# Patient Record
Sex: FEMALE | Race: WHITE | NOT HISPANIC OR LATINO | Employment: OTHER | URBAN - METROPOLITAN AREA
[De-identification: names, ages, dates, MRNs, and addresses within clinical notes are randomized per-mention and may not be internally consistent; named-entity substitution may affect disease eponyms.]

---

## 2017-01-12 ENCOUNTER — OFFICE VISIT (OUTPATIENT)
Dept: FAMILY MEDICINE | Facility: CLINIC | Age: 67
End: 2017-01-12
Payer: COMMERCIAL

## 2017-01-12 VITALS
HEART RATE: 71 BPM | TEMPERATURE: 97.5 F | SYSTOLIC BLOOD PRESSURE: 110 MMHG | DIASTOLIC BLOOD PRESSURE: 78 MMHG | BODY MASS INDEX: 20.06 KG/M2 | OXYGEN SATURATION: 99 % | WEIGHT: 109 LBS | HEIGHT: 62 IN | RESPIRATION RATE: 16 BRPM

## 2017-01-12 DIAGNOSIS — R29.6 FALLS FREQUENTLY: ICD-10-CM

## 2017-01-12 DIAGNOSIS — R26.89 BALANCE PROBLEMS: Primary | ICD-10-CM

## 2017-01-12 DIAGNOSIS — M19.042 PRIMARY OSTEOARTHRITIS OF BOTH HANDS: ICD-10-CM

## 2017-01-12 DIAGNOSIS — I49.3 PVC'S (PREMATURE VENTRICULAR CONTRACTIONS): ICD-10-CM

## 2017-01-12 DIAGNOSIS — S06.0X0A CONCUSSION WITHOUT LOSS OF CONSCIOUSNESS, INITIAL ENCOUNTER: ICD-10-CM

## 2017-01-12 DIAGNOSIS — R41.3 MEMORY LOSS: ICD-10-CM

## 2017-01-12 DIAGNOSIS — M19.041 PRIMARY OSTEOARTHRITIS OF BOTH HANDS: ICD-10-CM

## 2017-01-12 PROCEDURE — 99214 OFFICE O/P EST MOD 30 MIN: CPT | Performed by: FAMILY MEDICINE

## 2017-01-12 NOTE — MR AVS SNAPSHOT
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 No difference was  Noted by patients in a double blind study when given codeine, tylenol ( acetaminophen) or ibuprofen (all in identical pills). They felt no difference in pain relief. Since ibuprofen causes kidney damage, esophageal damage with heartburn, and can increase the risk of esophageal and stomach cancer and ulcers,and colonic strictures.  I recommend that you use tylenol(acetaminophen) for pain. Use the acetaminophen ES  Which has 500mgm/tablet You can take up to 2 tablets 4 times a day as need for pain.  If this is not enough, you can add in ibuprofen or aleve(naprosyn) with 2 glasses of fluid and some food-to protect the stomach and esophagus. Please let us know if you are continuing to take ibuprofen or aleve, as we will need to periodically check your kidney function with a blood test.    2. Warm soaks     3. Heating pad and keep your hands under the covers--electric blanket ?  Could use mittons     4. If no help with the balance PT clinic, then we should consider regular PT for balance and strength training and for the hands --incl OT     5. offerred EKG but deferred   Normal in 2013   Caffeine will stimulate the heart and cause PVCs = early beats        Follow-ups after your visit        Additional Services     PHYSICAL THERAPY REFERRAL       *This therapy referral will be filtered to a centralized scheduling office at [facility] and the patient will receive a call to schedule an appointment at a [place] location most convenient for them. *     [facility] provides Physical Therapy evaluation and treatment and many specialty services across the [place] system.  If requesting a specialty program, please choose from the list below.    If you have not heard from the scheduling office within 2 business days, please call [phone] for all locations, with the exception of [place], please call [phone].  Treatment: Evaluation & Treatment  Special Instructions/Modalities:     Special Programs: Balance/Vestibular    Please be aware that coverage of these services is subject to the terms and limitations of your health insurance plan.  Call member services at your health plan with any benefit or coverage questions.      **Note to Provider:  If you are referring outside of [place] for the therapy appointment, please list the name of the location in the  special instructions  above, print the referral and give to the patient to schedule the appointment.                  Follow-up notes from your care team     Return in about 3 months (around 4/12/2017) for Routine Visit.      Who to contact     If you have questions or need follow up information about today's clinic visit or your schedule please contact [facility] directly at [phone].  Normal or non-critical lab and imaging results will be communicated to you by MyChart, letter or phone within 4 business days after the clinic has received the results. If you do not hear from us within 7 days, please contact the clinic through MyChart or phone. If you have a critical or abnormal lab result, we will notify you by phone as soon as possible.  Submit refill requests through [employer] or call your pharmacy and they will forward the refill request to us. Please allow 3 business days for your refill to be completed.           "Additional Information About Your Visit        Care EveryWhere ID     This is your Care EveryWhere ID. This could be used by other organizations to access your [place] medical records  [id number]        Your Vitals Were     Pulse Temperature Respirations    71 97.5  F (36.4  C) (Tympanic) 16    Height BMI (Body Mass Index) Pulse Oximetry    5' 2\" (1.575 m) 19.93 kg/m2 99%    Last Period Breastfeeding?       (LMP Unknown) No        Blood Pressure from Last 3 Encounters:   01/12/17 110/78   12/29/16 112/60   11/03/16 110/60    Weight from Last 3 Encounters:   01/12/17 109 lb (49.442 kg)   12/29/16 109 lb (49.442 kg)   11/03/16 106 lb (48.081 kg)              We Performed the Following     PHYSICAL THERAPY REFERRAL        Primary Care Provider Office Phone # Fax #    Vale Wiseman [phone]       [facility] [address] [facility] [address]        Thank you!     Thank you for choosing [facility]  for your care. Our goal is always to provide you with excellent care. Hearing back from our patients is one way we can continue to improve our services. Please take a few minutes to complete the written survey that you may receive in the mail after your visit with us. Thank you!             Your Updated Medication List - Protect others around you: Learn how to safely use, store and throw away your medicines at www.disposemymeds.org.          This list is accurate as of: 1/12/17  1:27 PM.  Always use your most recent med list.                   Brand Name Dispense Instructions for use    aspirin 81 MG tablet     90 tablet    Take 1 tablet (81 mg) by mouth daily       atorvastatin 40 MG tablet    LIPITOR    90 tablet    Take 1 tablet (40 mg) by mouth daily       EPIPEN 2-LISA 0.3 MG/0.3ML injection   Generic drug:  EPINEPHrine     2 each    INJECT 0.3 MLS INTO THE MUSCLE ONCE AS NEEDED FOR ANAPHYLAXIS       levothyroxine 75 MCG tablet    " SYNTHROID/LEVOTHROID    90 tablet    Take 1 tablet (75 mcg) by mouth daily       OMEGA-3 FISH OIL PO      Take 1 g by mouth 2 times daily (with meals).       typhoid CR capsule    VIVOTIF    4 capsule    Take 1 capsule by mouth every other day With the last dose at least 7 days prior to exposure       VITAMIN D (CHOLECALCIFEROL) PO      Take 1,000 Units by mouth daily.

## 2017-01-12 NOTE — PROGRESS NOTES
SUBJECTIVE:                                                    Alma Moore is a 66 year old female who presents to clinic today for the following health issues:    Musculoskeletal problem/pain: Hand Arthritis         Duration: Chronic and Ongoing-worsening     Especially stiff in am but does not keep hands warm at night     fam hx - praticularly her mom     Description  Location: Both Hands-ache kell in the am when stiff & cold   Joints are enlarging     Intensity:  severe    Accompanying signs and symptoms: none    History  Previous similar problem: no    \fam hx of OA or degen arthritis of hands in mom    Neg fam hx for RZ, LE    Previous evaluation:   Neg ESR & WILLIAM     Precipitating or alleviating factors:  Trauma or overuse: no    Aggravating factors include: none    Therapies tried and outcome: nothing            Amount of exercise or physical activity: None    Problems taking medications regularly: No    Medication side effects: none  Diet: regular (no restrictions)           BAlance problems       Duration: since chemo for breast ca in 2013    Description   Feeling faint:  no   Feeling like the surroundings are moving: no   Loss of consciousness or falls: yes   With repeated head injuries/concussion but no LOC     Intensity:  moderate    Accompanying signs and symptoms:   Nausea/vomitting: no   Palpitations: YES- feels extra beats at night  Weakness in arms or legs: no   Vision or speech changes: no   Ringing in ears (Tinnitus): no   Hearing loss related to dizziness: no   Other (fevers/chills/sweating/dyspnea): no     History (similar episodes/head trauma/previous evaluation/recent bleeding): None    Precipitating or alleviating factors (new meds/chemicals): chemo  Worse with activity/head movement: no     Therapies tried and outcome: None     Rash      Duration: for several mos under OS     Description  Location: above   Itching: mild    Intensity:  mild    Accompanying signs and symptoms:  None    History (similar episodes/previous evaluation): None    Precipitating or alleviating factors:  New exposures:  None  Recent travel: no      Therapies tried and outcome: hydrocortisone cream -  Effective-essentially gone now     Also using more care when removing make up as was rubing there more      Extra Heart Beats       Duration:  Since 2013     Especially at night     Description (location/character/radiation): 0    Intensity:  mild    Accompanying signs and symptoms: 0    History (similar episodes/previous evaluation): None    Precipitating or alleviating factors: still has 2-4 C decaff coffee / d     Therapies tried and outcome: None     MEMORY LOSS       Duration:  Since chemo 2013     Description (location/character/radiation): above    Intensity:  mild    Accompanying signs and symptoms: 0    History (similar episodes/previous evaluation): None    Precipitating or alleviating factors: above    Therapies tried and outcome: None  Problem list and histories reviewed & adjusted, as indicated.  Additional history: as documented    Labs reviewed in EPIC  Problem list, Medication list, Allergies, and Medical/Social/Surgical histories reviewed in [facility] and updated as appropriate.    ROS:  C: NEGATIVE for fever, chills, change in weight  I: NEGATIVE for worrisome rashes, moles or lesions-resolved rash under OS   E: NEGATIVE for vision changes or irritation  E/M: NEGATIVE for ear, mouth and throat problems  R: NEGATIVE for significant cough or SOB  B: NEGATIVE for masses, tenderness or discharge  CV: NEGATIVE for chest pain,  or peripheral edema;palpitations= extra beats - kell at nite   GI: NEGATIVE for nausea, abdominal pain, heartburn, or change in bowel habits  : NEGATIVE for frequency, dysuria, or hematuria  M: NEGATIVE for significant arthralgias or myalgia  N: NEGATIVE for weakness, dizziness or paresthesias; off balance with hx of falls   E: NEGATIVE for temperature intolerance, skin/hair changes  H:  "NEGATIVE for bleeding problems  P: NEGATIVE for changes in mood or affect    OBJECTIVE:                                                    /78 mmHg  Pulse 71  Temp(Src) 97.5  F (36.4  C) (Tympanic)  Resp 16  Ht 5' 2\" (1.575 m)  Wt 109 lb (49.442 kg)  BMI 19.93 kg/m2  SpO2 99%  LMP  (LMP Unknown)  Breastfeeding? No  Body mass index is 19.93 kg/(m^2).  GENERAL: healthy, alert and no distress  EYES: Eyes grossly normal to inspection, PERRL and conjunctivae and sclerae normal  RESP: lungs clear to auscultation - no rales, rhonchi or wheezes  CV: regular rate and rhythm, normal S1 S2, no S3 or S4, no murmur, click or rub, no peripheral edema and peripheral pulses strong  MS: no gross musculoskeletal defects noted, no edema--has enlarge joints of the hands with synovial thickening   SKIN: no suspicious lesions or rashes  NEURO: Normal strength and tone, mentation intact and speech normal  PSYCH: mentation appears normal, affect normal/bright    Diagnostic Test Results:  none      ASSESSMENT/PLAN:                                                              ICD-10-CM    1. Balance problems from chemo 2013  R26.89 PHYSICAL THERAPY REFERRAL   2. Falls frequently causing recurrent head injuries  R29.6    3. Concussion without loss of consciousness, initial encounter 9-17-16 S06.0X0A    4. 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 Primary osteoarthritis of both hands-hereditary  M19.041     M19.042    6. PVC's (premature ventricular contractions) I49.3        Patient Instructions   1. No difference was  Noted by patients in a double blind study when given codeine, tylenol ( acetaminophen) or ibuprofen (all in identical pills). They felt no difference in pain relief. Since ibuprofen causes kidney damage, esophageal damage with heartburn, and can increase the risk of esophageal and stomach cancer and ulcers,and colonic strictures.  I recommend that you use tylenol(acetaminophen) for pain. Use the acetaminophen " ES  Which has 500mgm/tablet You can take up to 2 tablets 4 times a day as need for pain.  If this is not enough, you can add in ibuprofen or aleve(naprosyn) with 2 glasses of fluid and some food-to protect the stomach and esophagus. Please let us know if you are continuing to take ibuprofen or aleve, as we will need to periodically check your kidney function with a blood test.    2. Warm soaks     3. Heating pad and keep your hands under the covers--electric blanket ?  Could use mittons     4. If no help with the balance PT clinic, then we should consider regular PT for balance and strength training and for the hands --incl OT     5. offerred EKG but deferred   Normal in 2013   Caffeine will stimulate the heart and cause PVCs = early beats    As you age, all the above will worsen     Discussed all with pt  And the patient expresses understanding   pt must keep hands warm at nite and use tylenol for anti inflam effect     Recommend stop  Coffee re the PVCs but she refuses and refuses further cardiac work up  , even a n EKG .    Vale Wiseman MD  [facility]

## 2017-01-12 NOTE — PATIENT INSTRUCTIONS
1. No difference was  Noted by patients in a double blind study when given codeine, tylenol ( acetaminophen) or ibuprofen (all in identical pills). They felt no difference in pain relief. Since ibuprofen causes kidney damage, esophageal damage with heartburn, and can increase the risk of esophageal and stomach cancer and ulcers,and colonic strictures.  I recommend that you use tylenol(acetaminophen) for pain. Use the acetaminophen ES  Which has 500mgm/tablet You can take up to 2 tablets 4 times a day as need for pain.  If this is not enough, you can add in ibuprofen or aleve(naprosyn) with 2 glasses of fluid and some food-to protect the stomach and esophagus. Please let us know if you are continuing to take ibuprofen or aleve, as we will need to periodically check your kidney function with a blood test.    2. Warm soaks     3. Heating pad and keep your hands under the covers--electric blanket ?  Could use mittons     4. If no help with the balance PT clinic, then we should consider regular PT for balance and strength training and for the hands --incl OT     5. offerred EKG but deferred   Normal in 2013   Caffeine will stimulate the heart and cause PVCs = early beats    As you age, all the above will worsen

## 2017-01-12 NOTE — NURSING NOTE
"Chief Complaint   Patient presents with     Recheck Medication     /78 mmHg  Pulse 71  Temp(Src) 97.5  F (36.4  C) (Tympanic)  Resp 16  Ht 5' 2\" (1.575 m)  Wt 109 lb (49.442 kg)  BMI 19.93 kg/m2  SpO2 99%  LMP  (LMP Unknown)  Breastfeeding? No Estimated body mass index is 19.93 kg/(m^2) as calculated from the following:    Height as of this encounter: 5' 2\" (1.575 m).    Weight as of this encounter: 109 lb (49.442 kg).  BP completed using cuff size: peter Cannon CMA    There are no preventive care reminders to display for this patient.  Health Maintenance reviewed at today's visit patient asked to schedule/complete:   None, Health Maintenance up to date.      "

## 2017-01-18 ENCOUNTER — TRANSFERRED RECORDS (OUTPATIENT)
Dept: HEALTH INFORMATION MANAGEMENT | Facility: CLINIC | Age: 67
End: 2017-01-18

## 2017-01-24 ENCOUNTER — TELEPHONE (OUTPATIENT)
Dept: FAMILY MEDICINE | Facility: CLINIC | Age: 67
End: 2017-01-24

## 2017-01-24 NOTE — TELEPHONE ENCOUNTER
Diamox is not given out lightly as has many complication s of its own  Needs to come in to discuss and review

## 2017-01-24 NOTE — TELEPHONE ENCOUNTER
Reason for Call:  Medication or medication refill:    Do you use a [facility]?  Name of the pharmacy and phone number for the current request:  [facility] [address] AT [facility] [phone] (Phone)    Name of the medication requested: Altitude Sickness    Other request: Patient stated she is planning a trip to [facility] and that her friend recommended getting a Prescription for Altitude Sickness.    Can we leave a detailed message on this number? YES    Phone number patient can be reached at: Home number on file [phone] (home)    Best Time: When Prescription is approved.    Call taken on 1/24/2017 at 10:01 AM by SHANNAN MALONE

## 2017-01-24 NOTE — TELEPHONE ENCOUNTER
Attempted to call back, patient does not have an answering machine. Will try and call again tomorrow.

## 2017-01-26 NOTE — TELEPHONE ENCOUNTER
Patient was called and told appointment would be needed before script for medication would be issued.

## 2017-01-31 ENCOUNTER — OFFICE VISIT (OUTPATIENT)
Dept: FAMILY MEDICINE | Facility: CLINIC | Age: 67
End: 2017-01-31
Payer: COMMERCIAL

## 2017-01-31 VITALS
HEIGHT: 62 IN | OXYGEN SATURATION: 99 % | WEIGHT: 109.4 LBS | RESPIRATION RATE: 16 BRPM | BODY MASS INDEX: 20.13 KG/M2 | DIASTOLIC BLOOD PRESSURE: 60 MMHG | SYSTOLIC BLOOD PRESSURE: 110 MMHG | TEMPERATURE: 98.1 F | HEART RATE: 70 BPM

## 2017-01-31 DIAGNOSIS — Z29.89 ALTITUDE SICKNESS PROPHYLAXIS: Primary | ICD-10-CM

## 2017-01-31 DIAGNOSIS — Z88.2 ALLERGY TO SULFA DRUGS: ICD-10-CM

## 2017-01-31 PROCEDURE — 99212 OFFICE O/P EST SF 10 MIN: CPT | Performed by: FAMILY MEDICINE

## 2017-01-31 NOTE — NURSING NOTE
"Chief Complaint   Patient presents with     Consult     Discuss altitude sickness medication       Initial /60 mmHg  Pulse 70  Temp(Src) 98.1  F (36.7  C) (Tympanic)  Resp 16  Ht 5' 2\" (1.575 m)  Wt 109 lb 6.4 oz (49.624 kg)  BMI 20.00 kg/m2  SpO2 99%  LMP  (LMP Unknown)  Breastfeeding? No Estimated body mass index is 20 kg/(m^2) as calculated from the following:    Height as of this encounter: 5' 2\" (1.575 m).    Weight as of this encounter: 109 lb 6.4 oz (49.624 kg).  BP completed using cuff size: peter Odom LPN  "

## 2017-01-31 NOTE — PROGRESS NOTES
"  SUBJECTIVE:                                                    Alma Moore is a 66 year old female who presents to clinic today for the following health issues:        Consult re Altitude Sickness       Duration: Planning a trip and wants to discuss altitude sickness    Description (location/character/radiation): flies into [place] at > [place] and then to  [facility] at [place] so would be gradual     Is allergic to sulfa and this crosses over with diamox     Intensity:  NA    Accompanying signs and symptoms: None    History (similar episodes/previous evaluation): None    Precipitating or alleviating factors: None    Therapies tried and outcome: None           Problem list and histories reviewed & adjusted, as indicated.  Additional history: as documented    Labs reviewed in EPIC  Problem list, Medication list, Allergies, and Medical/Social/Surgical histories reviewed in [facility] and updated as appropriate.    ROS:  C: NEGATIVE for fever, chills, change in weight  I: NEGATIVE for worrisome rashes, moles or lesions  E: NEGATIVE for vision changes or irritation  E/M: NEGATIVE for ear, mouth and throat problems  R: NEGATIVE for significant cough or SOB  B: NEGATIVE for masses, tenderness or discharge  CV: NEGATIVE for chest pain, palpitations or peripheral edema  GI: NEGATIVE for nausea, abdominal pain, heartburn, or change in bowel habits  : NEGATIVE for frequency, dysuria, or hematuria  M: NEGATIVE for significant arthralgias or myalgia  N: NEGATIVE for weakness, dizziness or paresthesias  E: NEGATIVE for temperature intolerance, skin/hair changes  H: NEGATIVE for bleeding problems  P: NEGATIVE for changes in mood or affect    OBJECTIVE:                                                    /60 mmHg  Pulse 70  Temp(Src) 98.1  F (36.7  C) (Tympanic)  Resp 16  Ht 5' 2\" (1.575 m)  Wt 109 lb 6.4 oz (49.624 kg)  BMI 20.00 kg/m2  SpO2 99%  LMP  (LMP Unknown)  Breastfeeding? No  Body mass index is 20 " kg/(m^2).  GENERAL: healthy, alert and no distress  EYES: Eyes grossly normal to inspection, PERRL and conjunctivae and sclerae normal  RESP: lungs clear to auscultation - no rales, rhonchi or wheezes  MS: no gross musculoskeletal defects noted, no edema  SKIN: no suspicious lesions or rashes  NEURO: Normal strength and tone, mentation intact and speech normal  PSYCH: mentation appears normal, affect normal/bright    Diagnostic Test Results:  none      ASSESSMENT/PLAN:                                                              ICD-10-CM    1. Altitude sickness prophylaxis Z41.8    2. Allergy to sulfa drugs Z88.2        PLAN     As she doesn't qualify for diamox with the gradual elevation to 8000 ft over 8-10 hrs, and has never had trouble at this elevation before , and as is allergic to sulfa which croses over with diamox, should monitor for signs of altitude sickness and go to the clinic at [facility] if develops them for Rx with steroids   Discussed all with pt  And the patient expresses understanding  Time spent with the patient 12mins, more than 50% in counseling and coordinating care, Re above medical problems.  This time was  spent in review of data, the record, and in discussion and counselling  With > 50% face-to-face time as per above       MD Vale Yost MD  [facility]

## 2017-01-31 NOTE — MR AVS SNAPSHOT
"              After Visit Summary   1/31/2017    Alma Moore    MRN: [id number]           Patient Information     Date Of Birth          1950        Visit Information        Provider Department      1/31/2017 11:40 AM Vale Wiseman MD [facility]         Follow-ups after your visit        Who to contact     If you have questions or need follow up information about today's clinic visit or your schedule please contact [facility] directly at [phone].  Normal or non-critical lab and imaging results will be communicated to you by MyChart, letter or phone within 4 business days after the clinic has received the results. If you do not hear from us within 7 days, please contact the clinic through MyChart or phone. If you have a critical or abnormal lab result, we will notify you by phone as soon as possible.  Submit refill requests through [employer] or call your pharmacy and they will forward the refill request to us. Please allow 3 business days for your refill to be completed.          Additional Information About Your Visit        Care EveryWhere ID     This is your Care EveryWhere ID. This could be used by other organizations to access your [place] medical records  [id number]        Your Vitals Were     Pulse Temperature Respirations    70 98.1  F (36.7  C) (Tympanic) 16    Height BMI (Body Mass Index) Pulse Oximetry    5' 2\" (1.575 m) 20.00 kg/m2 99%    Last Period Breastfeeding?       (LMP Unknown) No        Blood Pressure from Last 3 Encounters:   01/31/17 110/60   01/12/17 110/78   12/29/16 112/60    Weight from Last 3 Encounters:   01/31/17 109 lb 6.4 oz (49.624 kg)   01/12/17 109 lb (49.442 kg)   12/29/16 109 lb (49.442 kg)              Today, you had the following     No orders found for display       Primary Care Provider Office Phone # Fax #    Vale Wiseman [phone]       [facility] " [address]  [facility] [address]        Thank you!     [facility]  [facility] your care. Our goal is always to provide you with excellent care. Hearing back from our patients is one way we can continue to improve our services. Please take a few minutes to complete the written survey that you may receive in the mail after your visit with us. Thank you!             Your Updated Medication List - Protect others around you: Learn how to safely use, store and throw away your medicines at www.disposemymeds.org.          This list is accurate as of: 1/31/17 12:24 PM.  Always use your most recent med list.                   Brand Name Dispense Instructions for use    aspirin 81 MG tablet     90 tablet    Take 1 tablet (81 mg) by mouth daily       atorvastatin 40 MG tablet    LIPITOR    90 tablet    Take 1 tablet (40 mg) by mouth daily       EPIPEN 2-LISA 0.3 MG/0.3ML injection   Generic drug:  EPINEPHrine     2 each    INJECT 0.3 MLS INTO THE MUSCLE ONCE AS NEEDED FOR ANAPHYLAXIS       levothyroxine 75 MCG tablet    SYNTHROID/LEVOTHROID    90 tablet    Take 1 tablet (75 mcg) by mouth daily       OMEGA-3 FISH OIL PO      Take 1 g by mouth 2 times daily (with meals).       typhoid CR capsule    VIVOTIF    4 capsule    Take 1 capsule by mouth every other day With the last dose at least 7 days prior to exposure       VITAMIN D (CHOLECALCIFEROL) PO      Take 1,000 Units by mouth daily.

## 2017-02-03 ENCOUNTER — OFFICE VISIT (OUTPATIENT)
Dept: FAMILY MEDICINE | Facility: CLINIC | Age: 67
End: 2017-02-03
Payer: COMMERCIAL

## 2017-02-03 VITALS
DIASTOLIC BLOOD PRESSURE: 78 MMHG | WEIGHT: 107 LBS | OXYGEN SATURATION: 99 % | BODY MASS INDEX: 19.69 KG/M2 | SYSTOLIC BLOOD PRESSURE: 118 MMHG | HEIGHT: 62 IN | RESPIRATION RATE: 18 BRPM | TEMPERATURE: 97.1 F | HEART RATE: 72 BPM

## 2017-02-03 DIAGNOSIS — N30.01 ACUTE CYSTITIS WITH HEMATURIA: ICD-10-CM

## 2017-02-03 LAB
ALBUMIN UR-MCNC: 100 MG/DL
APPEARANCE UR: ABNORMAL
BACTERIA #/AREA URNS HPF: ABNORMAL /HPF
BILIRUB UR QL STRIP: NEGATIVE
COLOR UR AUTO: YELLOW
GLUCOSE UR STRIP-MCNC: NEGATIVE MG/DL
HGB UR QL STRIP: ABNORMAL
KETONES UR STRIP-MCNC: NEGATIVE MG/DL
LEUKOCYTE ESTERASE UR QL STRIP: ABNORMAL
NITRATE UR QL: NEGATIVE
PH UR STRIP: 7 PH (ref 5–7)
RBC #/AREA URNS AUTO: ABNORMAL /HPF (ref 0–2)
SP GR UR STRIP: 1.01 (ref 1–1.03)
URN SPEC COLLECT METH UR: ABNORMAL
UROBILINOGEN UR STRIP-ACNC: 0.2 EU/DL (ref 0.2–1)
WBC #/AREA URNS AUTO: ABNORMAL /HPF (ref 0–2)

## 2017-02-03 PROCEDURE — 87186 SC STD MICRODIL/AGAR DIL: CPT | Performed by: FAMILY MEDICINE

## 2017-02-03 PROCEDURE — 87088 URINE BACTERIA CULTURE: CPT | Performed by: FAMILY MEDICINE

## 2017-02-03 PROCEDURE — 99213 OFFICE O/P EST LOW 20 MIN: CPT | Performed by: FAMILY MEDICINE

## 2017-02-03 PROCEDURE — 87086 URINE CULTURE/COLONY COUNT: CPT | Performed by: FAMILY MEDICINE

## 2017-02-03 PROCEDURE — 81001 URINALYSIS AUTO W/SCOPE: CPT | Performed by: FAMILY MEDICINE

## 2017-02-03 RX ORDER — AMOXICILLIN 500 MG/1
500 CAPSULE ORAL 3 TIMES DAILY
Qty: 21 CAPSULE | Refills: 0 | Status: SHIPPED | OUTPATIENT
Start: 2017-02-03 | End: 2017-06-22

## 2017-02-03 NOTE — PATIENT INSTRUCTIONS
1. You have  a bladder infection Please increase your water intake  Do not drink cranberry juice as this does nothing different from water except cause weight gain.   We  Will notify you re the results of today's urine culture  You may need to return to be sure the infection is gone.  Oftentimes sexual intercourse causes the bacteria from the rectum to go into the bladder and cause infection.  To avoid this, please get up and go to the bathroom after intercourse and drink a large glass of water.  This allows the urine to wash out the infective bacteria and thus speed healing of an infection, and prevent one from occurring.  You may buy pyridium over the counter to help with the burning  It is a purple pill that turns the urine orange and helps with the burning

## 2017-02-03 NOTE — PROGRESS NOTES
"  SUBJECTIVE:                                                    Alma Moore is a 66 year old female who presents to clinic today for the following health issues:      URINARY TRACT SYMPTOMS      Duration: x 3 days    Description  dysuria, frequency, hematuria and incontinence    Intensity:  mild    Accompanying signs and symptoms:  Fever/chills: YES  Flank pain YES  Nausea and vomiting: YES  Vaginal symptoms: none  Abdominal/Pelvic Pain: YES    History  History of frequent UTI's: YES  History of kidney stones: no   Sexually Active: no   Possibility of pregnancy: No    Precipitating or alleviating factors: USED A VAG dilator 3-4 d ago per her oncologist's orders     Had loose stool the same day     Therapies tried and outcome: none   Outcome: NA           Problem list and histories reviewed & adjusted, as indicated.  Additional history: as documented    Labs reviewed in EPIC  Problem list, Medication list, Allergies, and Medical/Social/Surgical histories reviewed in [facility] and updated as appropriate.    ROS:  C: NEGATIVE for fever, chills, change in weight  I: NEGATIVE for worrisome rashes, moles or lesions  E: NEGATIVE for vision changes or irritation  E/M: NEGATIVE for ear, mouth and throat problems  R: NEGATIVE for significant cough or SOB  B: NEGATIVE for masses, tenderness or discharge  CV: NEGATIVE for chest pain, palpitations or peripheral edema  GI: NEGATIVE for nausea, abdominal pain, heartburn, or change in bowel habits   female: dysuria, frequency, hematuria and urgency  M: NEGATIVE for significant arthralgias or myalgia  N: NEGATIVE for weakness, dizziness or paresthesias  E: NEGATIVE for temperature intolerance, skin/hair changes  H: NEGATIVE for bleeding problems  P: NEGATIVE for changes in mood or affect    OBJECTIVE:                                                    /78 mmHg  Pulse 72  Temp(Src) 97.1  F (36.2  C) (Tympanic)  Resp 18  Ht 5' 2\" (1.575 m)  Wt 107 lb (48.535 kg)  BMI " 19.57 kg/m2  SpO2 99%  LMP  (LMP Unknown)  Breastfeeding? No  Body mass index is 19.57 kg/(m^2).  GENERAL: healthy, alert and no distress  EYES: Eyes grossly normal to inspection, PERRL and conjunctivae and sclerae normal  RESP: lungs clear to auscultation - no rales, rhonchi or wheezes  ABDOMEN: soft, nontender, no hepatosplenomegaly, no masses and bowel sounds normal;No tenderness suprapubically or in the CVAs   MS: no gross musculoskeletal defects noted, no edema  SKIN: no suspicious lesions or rashes  NEURO: Normal strength and tone, mentation intact and speech normal  PSYCH: mentation appears normal, affect normal/bright    Diagnostic Test Results:  Results for orders placed or performed in visit on 02/03/17 (from the past 24 hour(s))   UA reflex to Microscopic and Culture   Result Value Ref Range    Color Urine Yellow     Appearance Urine Cloudy     Glucose Urine Negative NEG mg/dL    Bilirubin Urine Negative NEG    Ketones Urine Negative NEG mg/dL    Specific Gravity Urine 1.015 1.003 - 1.035    Blood Urine Large (A) NEG    pH Urine 7.0 5.0 - 7.0 pH    Protein Albumin Urine 100 (A) NEG mg/dL    Urobilinogen Urine 0.2 0.2 - 1.0 EU/dL    Nitrite Urine Negative NEG    Leukocyte Esterase Urine Large (A) NEG    Source Midstream Urine    Urine Microscopic   Result Value Ref Range    WBC Urine 10-25 (A) 0 - 2 /HPF    RBC Urine 10-25 (A) 0 - 2 /HPF    Bacteria Urine Moderate (A) NEG /HPF        ASSESSMENT/PLAN:                                                              ICD-10-CM    1. Acute cystitis with hematuria N30.01 amoxicillin (AMOXIL) 500 MG capsule       Patient Instructions   1. You have  a bladder infection Please increase your water intake  Do not drink cranberry juice as this does nothing different from water except cause weight gain.   We  Will notify you re the results of today's urine culture  You may need to return to be sure the infection is gone.  Oftentimes sexual intercourse causes the  bacteria from the rectum to go into the bladder and cause infection.  To avoid this, please get up and go to the bathroom after intercourse and drink a large glass of water.  This allows the urine to wash out the infective bacteria and thus speed healing of an infection, and prevent one from occurring.  You may buy pyridium over the counter to help with the burning  It is a purple pill that turns the urine orange and helps with the burning         The etiology of this UTI  Appears to be  That she 1st had loose stools that contaminated the urethra  And then  May have been pushed farther up the urethra by manipulation of [facility] vaginal dilator     Vale Wiseman MD  [facility]

## 2017-02-03 NOTE — MR AVS SNAPSHOT
After Visit Summary   2/3/2017    Alma Moore    MRN: [id number]           Patient Information     Date Of Birth          1950        Visit Information        Provider Department      2/3/2017 8:40 AM Vale Wiseman MD [facility]        Today's Diagnoses     Acute cystitis with hematuria           Care Instructions    1. You have  a bladder infection Please increase your water intake  Do not drink cranberry juice as this does nothing different from water except cause weight gain.   We  Will notify you re the results of today's urine culture  You may need to return to be sure the infection is gone.  Oftentimes sexual intercourse causes the bacteria from the rectum to go into the bladder and cause infection.  To avoid this, please get up and go to the bathroom after intercourse and drink a large glass of water.  This allows the urine to wash out the infective bacteria and thus speed healing of an infection, and prevent one from occurring.  You may buy pyridium over the counter to help with the burning  It is a purple pill that turns the urine orange and helps with the burning           Follow-ups after your visit        Who to contact     If you have questions or need follow up information about today's clinic visit or your schedule please contact [facility] directly at [phone].  Normal or non-critical lab and imaging results will be communicated to you by MyChart, letter or phone within 4 business days after the clinic has received the results. If you do not hear from us within 7 days, please contact the clinic through MyChart or phone. If you have a critical or abnormal lab result, we will notify you by phone as soon as possible.  Submit refill requests through [employer] or call your pharmacy and they will forward the refill request to us. Please allow 3 business days for your refill to be completed.           "Additional Information About Your Visit        Care EveryWhere ID     This is your Care EveryWhere ID. This could be used by other organizations to access your [place] medical records  [id number]        Your Vitals Were     Pulse Temperature Respirations    72 97.1  F (36.2  C) (Tympanic) 18    Height BMI (Body Mass Index) Pulse Oximetry    5' 2\" (1.575 m) 19.57 kg/m2 99%    Last Period Breastfeeding?       (LMP Unknown) No        Blood Pressure from Last 3 Encounters:   02/03/17 118/78   01/31/17 110/60   01/12/17 110/78    Weight from Last 3 Encounters:   02/03/17 107 lb (48.535 kg)   01/31/17 109 lb 6.4 oz (49.624 kg)   01/12/17 109 lb (49.442 kg)              We Performed the Following     UA reflex to Microscopic and Culture     Urine Culture Aerobic Bacterial     Urine Microscopic          Today's Medication Changes          These changes are accurate as of: 2/3/17  9:15 AM.  If you have any questions, ask your nurse or doctor.               Start taking these medicines.        Dose/Directions    amoxicillin 500 MG capsule   Commonly known as:  AMOXIL   Used for:  Acute cystitis with hematuria   Started by:  Vale Wiseman MD        Dose:  500 mg   Take 1 capsule (500 mg) by mouth 3 times daily   Quantity:  21 capsule   Refills:  0            Where to get your medicines      These medications were sent to [facility] [address] [place], [place] [address] [address] [address]     Phone:  [phone]    - amoxicillin 500 MG capsule             Primary Care Provider Office Phone # Fax #    Vale Wiseman [phone]       [facility] [address] [facility] [address]        Thank you!     Thank you for choosing [facility]  for your care. Our goal is always to provide you with excellent care. Hearing back from our patients is one way we can continue to improve our " services. Please take a few minutes to complete the written survey that you may receive in the mail after your visit with us. Thank you!             Your Updated Medication List - Protect others around you: Learn how to safely use, store and throw away your medicines at www.disposemymeds.org.          This list is accurate as of: 2/3/17  9:15 AM.  Always use your most recent med list.                   Brand Name Dispense Instructions for use    amoxicillin 500 MG capsule    AMOXIL    21 capsule    Take 1 capsule (500 mg) by mouth 3 times daily       aspirin 81 MG tablet     90 tablet    Take 1 tablet (81 mg) by mouth daily       atorvastatin 40 MG tablet    LIPITOR    90 tablet    Take 1 tablet (40 mg) by mouth daily       EPIPEN 2-LSIA 0.3 MG/0.3ML injection   Generic drug:  EPINEPHrine     2 each    INJECT 0.3 MLS INTO THE MUSCLE ONCE AS NEEDED FOR ANAPHYLAXIS       levothyroxine 75 MCG tablet    SYNTHROID/LEVOTHROID    90 tablet    Take 1 tablet (75 mcg) by mouth daily       OMEGA-3 FISH OIL PO      Take 1 g by mouth 2 times daily (with meals).       typhoid CR capsule    VIVOTIF    4 capsule    Take 1 capsule by mouth every other day With the last dose at least 7 days prior to exposure       VITAMIN D (CHOLECALCIFEROL) PO      Take 1,000 Units by mouth daily.

## 2017-02-03 NOTE — NURSING NOTE
"Chief Complaint   Patient presents with     UTI     /78 mmHg  Pulse 72  Temp(Src) 97.1  F (36.2  C) (Tympanic)  Resp 18  Ht 5' 2\" (1.575 m)  Wt 107 lb (48.535 kg)  BMI 19.57 kg/m2  SpO2 99%  LMP  (LMP Unknown)  Breastfeeding? No Estimated body mass index is 19.57 kg/(m^2) as calculated from the following:    Height as of this encounter: 5' 2\" (1.575 m).    Weight as of this encounter: 107 lb (48.535 kg).  BP completed using cuff size: peter Cannon CMA    There are no preventive care reminders to display for this patient.  Health Maintenance reviewed at today's visit patient asked to schedule/complete:   None, Health Maintenance up to date.      "

## 2017-02-05 LAB
BACTERIA SPEC CULT: ABNORMAL
MICRO REPORT STATUS: ABNORMAL
MICROORGANISM SPEC CULT: ABNORMAL
SPECIMEN SOURCE: ABNORMAL

## 2017-02-07 ENCOUNTER — TELEPHONE (OUTPATIENT)
Dept: FAMILY MEDICINE | Facility: CLINIC | Age: 67
End: 2017-02-07

## 2017-02-07 DIAGNOSIS — N30.01 ACUTE CYSTITIS WITH HEMATURIA: Primary | ICD-10-CM

## 2017-02-07 RX ORDER — CEPHALEXIN 500 MG/1
500 CAPSULE ORAL 3 TIMES DAILY
Qty: 30 CAPSULE | Refills: 0 | Status: SHIPPED | OUTPATIENT
Start: 2017-02-07 | End: 2017-06-22

## 2017-04-28 ENCOUNTER — TELEPHONE (OUTPATIENT)
Dept: FAMILY MEDICINE | Facility: CLINIC | Age: 67
End: 2017-04-28

## 2017-04-28 NOTE — TELEPHONE ENCOUNTER
Patient called the clinic requesting Ambien or a stronger sleeping pill.   Patient has found her mother on the floor on Tuesday, but still alive, and  yesterday.   She has been only sleeping 5-6 hours at night, exhausted, and stressed out from planning the .  She also has heart palpations and dizziness (which she refused to discuss), but no chest pain.   She has tried OTC Advil PM and Melatonin which is not working. Last OV: 2/3/17   Will route to PCP for review.

## 2017-04-28 NOTE — TELEPHONE ENCOUNTER
Reason for Call:  Other prescription  Detailed comments: patient would like some sleeping medication  Phone Number Patient can be reached at: Other phone number:  [phone]  Best Time: any  Can we leave a detailed message on this number? YES  Call taken on 4/28/2017 at 10:15 AM by WENDY MURO

## 2017-04-28 NOTE — TELEPHONE ENCOUNTER
"Patient called requesting trouble sleeping for 5 days. Reports increased stress in family. She found her mother on the floor, there is a family  this weekend. Has tried melatonin and Advil PM with no relief. Declined PHQ9- \"i dont needed all the time I just for now. Asking for Rx. HC instructions given-avoid energy drinks or caffeine-states she is aware. Please advice on Rx request.or if PCP would prefer OV. Preferred pharmacy pended.  "

## 2017-04-28 NOTE — TELEPHONE ENCOUNTER
Cannot Rx  A controlled med over the phone    She should try melatonin ...benadryl     Needs to be seen for more & may need further treatment so should be seen

## 2017-06-09 ENCOUNTER — RADIANT APPOINTMENT (OUTPATIENT)
Dept: MAMMOGRAPHY | Facility: CLINIC | Age: 67
End: 2017-06-09
Attending: FAMILY MEDICINE
Payer: COMMERCIAL

## 2017-06-09 DIAGNOSIS — E03.9 ACQUIRED HYPOTHYROIDISM: ICD-10-CM

## 2017-06-09 DIAGNOSIS — Z12.31 VISIT FOR SCREENING MAMMOGRAM: ICD-10-CM

## 2017-06-09 LAB — TSH SERPL DL<=0.005 MIU/L-ACNC: 0.63 MU/L (ref 0.4–4)

## 2017-06-09 PROCEDURE — 84443 ASSAY THYROID STIM HORMONE: CPT | Performed by: FAMILY MEDICINE

## 2017-06-09 PROCEDURE — G0202 SCR MAMMO BI INCL CAD: HCPCS | Mod: TC

## 2017-06-09 PROCEDURE — 36415 COLL VENOUS BLD VENIPUNCTURE: CPT | Performed by: FAMILY MEDICINE

## 2017-06-22 ENCOUNTER — OFFICE VISIT (OUTPATIENT)
Dept: FAMILY MEDICINE | Facility: CLINIC | Age: 67
End: 2017-06-22
Payer: COMMERCIAL

## 2017-06-22 VITALS
BODY MASS INDEX: 18.58 KG/M2 | OXYGEN SATURATION: 98 % | DIASTOLIC BLOOD PRESSURE: 78 MMHG | RESPIRATION RATE: 18 BRPM | HEART RATE: 59 BPM | TEMPERATURE: 96 F | SYSTOLIC BLOOD PRESSURE: 116 MMHG | WEIGHT: 101 LBS | HEIGHT: 62 IN

## 2017-06-22 DIAGNOSIS — E78.00 HYPERCHOLESTEROLEMIA: ICD-10-CM

## 2017-06-22 DIAGNOSIS — M67.449 MUCOUS CYST OF FINGER: Primary | ICD-10-CM

## 2017-06-22 DIAGNOSIS — E46 PROTEIN-CALORIE MALNUTRITION (H): ICD-10-CM

## 2017-06-22 DIAGNOSIS — C56.9 MALIGNANT NEOPLASM OF OVARY, UNSPECIFIED LATERALITY (H): ICD-10-CM

## 2017-06-22 DIAGNOSIS — R73.02 GLUCOSE INTOLERANCE (IMPAIRED GLUCOSE TOLERANCE): Chronic | ICD-10-CM

## 2017-06-22 LAB — HBA1C MFR BLD: 5.4 % (ref 4.3–6)

## 2017-06-22 PROCEDURE — 99214 OFFICE O/P EST MOD 30 MIN: CPT | Performed by: FAMILY MEDICINE

## 2017-06-22 PROCEDURE — 83036 HEMOGLOBIN GLYCOSYLATED A1C: CPT | Performed by: FAMILY MEDICINE

## 2017-06-22 PROCEDURE — 84460 ALANINE AMINO (ALT) (SGPT): CPT | Performed by: FAMILY MEDICINE

## 2017-06-22 PROCEDURE — 80061 LIPID PANEL: CPT | Performed by: FAMILY MEDICINE

## 2017-06-22 PROCEDURE — 36415 COLL VENOUS BLD VENIPUNCTURE: CPT | Performed by: FAMILY MEDICINE

## 2017-06-22 NOTE — MR AVS SNAPSHOT
"              After Visit Summary   6/22/2017    Alma Moore    MRN: [id number]           Patient Information     Date Of Birth          1950        Visit Information        Provider Department      6/22/2017 1:00 PM Vale Wiseman MD [facility]        Today's Diagnoses     Mucous cyst of finger Lt ring finger nondominant  DIP joint since 3-17     -  1    Protein-calorie malnutrition (H)        Glucose intolerance (impaired glucose tolerance): Hgb A1C= 5.7 in 2014        Hypercholesterolemia          Care Instructions    1. Warm soaks 10min 2 x a day and press dry     2. Could consider incising it if doesn't go away     3. See us in December           Follow-ups after your visit        Who to contact     If you have questions or need follow up information about today's clinic visit or your schedule please contact [facility] directly at [phone].  Normal or non-critical lab and imaging results will be communicated to you by MyChart, letter or phone within 4 business days after the clinic has received the results. If you do not hear from us within 7 days, please contact the clinic through MyChart or phone. If you have a critical or abnormal lab result, we will notify you by phone as soon as possible.  Submit refill requests through [employer] or call your pharmacy and they will forward the refill request to us. Please allow 3 business days for your refill to be completed.          Additional Information About Your Visit        [employer] Information     [employer] lets you send messages to your doctor, view your test results, renew your prescriptions, schedule appointments and more. To sign up, go to [email or web address] . Click on \"Log in\" on the left side of the screen, which will take you to the Welcome page. Then click on \"Sign up Now\" on the right side of the page.     You will be asked to enter the access code listed below, as " "well as some personal information. Please follow the directions to create your username and password.     Your access code is: [email or web address]  Expires: 2017  1:38 PM     Your access code will  in 90 days. If you need help or a new code, please call your [place] clinic or [phone].        Care EveryWhere ID     This is your Care EveryWhere ID. This could be used by other organizations to access your [place] medical records  [id number]        Your Vitals Were     Pulse Temperature Respirations Height Last Period Pulse Oximetry    59 96  F (35.6  C) (Tympanic) 18 5' 2\" (1.575 m) (LMP Unknown) 98%    Breastfeeding? BMI (Body Mass Index)                No 18.47 kg/m2           Blood Pressure from Last 3 Encounters:   17 116/78   17 118/78   17 110/60    Weight from Last 3 Encounters:   17 101 lb (45.8 kg)   17 107 lb (48.5 kg)   17 109 lb 6.4 oz (49.6 kg)              We Performed the Following     ALT     Hemoglobin A1c     Lipid panel reflex to direct LDL        Primary Care Provider Office Phone # Fax #    Vale Wiseman [phone]       [facility] [address] [facility] [address]        Equal Access to Services     YUE CARROLL : Hadii aad ku hadasho Soomaali, waaxda luqadaha, qaybta kaalmada adeegyada, wilfredo poon . So [facility] [phone].    ATENCIÓN: Si habla español, tiene a ceballos disposición servicios gratuitos de asistencia lingüística. Llame al [phone].    We comply with applicable federal civil rights laws and [place] laws. We do not discriminate on the basis of race, color, national origin, age, disability sex, sexual orientation or gender identity.            Thank you!     Thank you for choosing [facility]  for your care. Our goal is always to provide you with excellent care. Hearing back from our patients is one way we can continue to improve " our services. Please take a few minutes to complete the written survey that you may receive in the mail after your visit with us. Thank you!             Your Updated Medication List - Protect others around you: Learn how to safely use, store and throw away your medicines at www.disposemymeds.org.          This list is accurate as of: 6/22/17  1:39 PM.  Always use your most recent med list.                   Brand Name Dispense Instructions for use Diagnosis    aspirin 81 MG tablet     90 tablet    Take 1 tablet (81 mg) by mouth daily    Routine general medical examination at a health care facility       atorvastatin 40 MG tablet    LIPITOR    90 tablet    TAKE 1 TABLET EVERY DAY    Hypercholesterolemia       EPIPEN 2-LISA 0.3 MG/0.3ML injection   Generic drug:  EPINEPHrine     2 each    INJECT 0.3 MLS INTO THE MUSCLE ONCE AS NEEDED FOR ANAPHYLAXIS    Bee sting allergy       levothyroxine 75 MCG tablet    SYNTHROID/LEVOTHROID    90 tablet    TAKE 1 TABLET EVERY DAY    Acquired hypothyroidism       OMEGA-3 FISH OIL PO      Take 1 g by mouth 2 times daily (with meals).        typhoid CR capsule    VIVOTIF    4 capsule    Take 1 capsule by mouth every other day With the last dose at least 7 days prior to exposure    Travel advice encounter       VITAMIN D (CHOLECALCIFEROL) PO      Take 1,000 Units by mouth daily.

## 2017-06-22 NOTE — PROGRESS NOTES
SUBJECTIVE:                                                    Alma Moore is a 67 year old female who presents to clinic today for the following health issues:    FB LEsion of Lt nondom Ring finger DIP joint       Onset: x 2-3 weeks    No known injury     Description (location/number): Wart on Left Hand, Ring Finger    Accompanying signs and symptoms: Painful: YES-tender     History: prior : no     Therapies tried and outcome: None     Glucose Intolerance  Follow-up      Patient is checking blood sugars: not at all    Diabetic concerns: None     Symptoms of hypoglycemia (low blood sugar): none     Paresthesias (numbness or burning in feet) or sores: No     Date of last diabetic eye exam: 2016     PROTEIN CALORIE MALNUTRITION     -lost with mom ding   -hikes a lot   Problem list and histories reviewed & adjusted, as indicated.  Additional history: as documented    Hyperlipidemia Follow-Up      Rate your low fat/cholesterol diet?: good    Taking statin?  Yes, no muscle aches from 40mgm atorvastatin    Other lipid medications/supplements?:  None    11-16 lipids wnl        Malignancy of Ovary       Duration: 6-12    Description (location/character/radiation): above     Intensity:  moderate    Accompanying signs and symptoms: no recurrence     History (similar episodes/previous evaluation): None    Precipitating or alleviating factors: None    Therapies tried and outcome: per oncol , Dr Merlos         Labs reviewed in EPIC    Reviewed and updated as needed this visit by clinical staff  Allergies  Meds  Problems       Reviewed and updated as needed this visit by Provider  Allergies  Meds  Problems         ROS:  C: NEGATIVE for fever, chills, change in weight  I: NEGATIVE for worrisome rashes, moles or lesions--syst on finger   E: NEGATIVE for vision changes or irritation  E/M: NEGATIVE for ear, mouth and throat problems  R: NEGATIVE for significant cough or SOB  B: NEGATIVE for masses, tenderness or  "discharge  CV: NEGATIVE for chest pain, palpitations or peripheral edema  GI: NEGATIVE for nausea, abdominal pain, heartburn, or change in bowel habits  : NEGATIVE for frequency, dysuria, or hematuria  M: NEGATIVE for significant arthralgias or myalgia  N: NEGATIVE for weakness, dizziness or paresthesias  E: NEGATIVE for temperature intolerance, skin/hair changes  H: NEGATIVE for bleeding problems  P: NEGATIVE for changes in mood or affect    OBJECTIVE:                                                    /78  Pulse 59  Temp 96  F (35.6  C) (Tympanic)  Resp 18  Ht 5' 2\" (1.575 m)  Wt 101 lb (45.8 kg)  LMP  (LMP Unknown)  SpO2 98%  Breastfeeding? No  BMI 18.47 kg/m2  Body mass index is 18.47 kg/(m^2).  GENERAL: healthy, alert and no distress  EYES: Eyes grossly normal to inspection, PERRL and conjunctivae and sclerae normal  RESP: lungs clear to auscultation - no rales, rhonchi or wheezes  CV: regular rate and rhythm, normal S1 S2, no S3 or S4, no murmur, click or rub, no peripheral edema and peripheral pulses strong  MS: no gross musculoskeletal defects noted, no edema  SKIN: no suspicious lesions or rashes--pinpoint at center with surrounding red for 4mm radius and tender over the OA nodule of dorsus omf ring finger DIP jt   NEURO: Normal strength and tone, mentation intact and speech normal  PSYCH: mentation appears normal, affect normal/bright    Diagnostic Test Results:  none      ASSESSMENT/PLAN:                                                              ICD-10-CM    1. Mucous cyst of finger Lt ring finger nondominant  DIP joint since 3-17  M67.449    2. Malignant neoplasm of ovary, unspecified laterality (H) C56.9    3. Protein-calorie malnutrition (H) E46    4. Glucose intolerance (impaired glucose tolerance): Hgb A1C= 5.7 in 2014 R73.02 Hemoglobin A1c   5. Hypercholesterolemia E78.00 Lipid panel reflex to direct LDL     ALT       Patient Instructions   1. Warm soaks 10min 2 x a day and press " dry     2. Could consider incising it if doesn't go away     3. See us in December     Will try warm soaks to see if can alleviate  If not, would send to hand surgeon as this is possibly a ganglion cyst   Vale Wiseman MD  [facility]    Weight management plan noted, stable and monitoring      Vale Wiseman MD

## 2017-06-22 NOTE — LETTER
"    6/23/2017     Alma Moore  [address] [facility] [address]    Dear Alma:    Here are the results of your latest lipid tests:    LDL Cholesterol Calculated   Date Value Ref Range Status   06/22/2017 81 <100 mg/dL Final     Comment:     Desirable:       <100 mg/dl   ]  HDL Cholesterol   Date Value Ref Range Status   06/22/2017 120 >49 mg/dL Final   ]  Triglycerides   Date Value Ref Range Status   06/22/2017 50 <150 mg/dL Final   ]  Cholesterol   Date Value Ref Range Status   06/22/2017 211 (H) <200 mg/dL Final     Comment:     Desirable:       <200 mg/dl   ]    LDL is the \"bad\" cholesterol linked to heart disease and stroke.   HDL is the \"good\" choesterol and when it is high, it decreases the risk for above problems.    Prediabetic blood sugar     Follow a low-fat, low-cholesterol diet and get regular exercise.  Please feel free to call the clinic if you have any questions.    So sad to hear re your mother's death .....    Sincerely,      Vale Wiseman   "

## 2017-06-22 NOTE — PATIENT INSTRUCTIONS
1. Warm soaks 10min 2 x a day and press dry     2. Could consider incising it if doesn't go away     3. See us in December

## 2017-06-23 LAB
ALT SERPL W P-5'-P-CCNC: 27 U/L (ref 0–50)
CHOLEST SERPL-MCNC: 211 MG/DL
HDLC SERPL-MCNC: 120 MG/DL
LDLC SERPL CALC-MCNC: 81 MG/DL
NONHDLC SERPL-MCNC: 91 MG/DL
TRIGL SERPL-MCNC: 50 MG/DL

## 2017-07-31 DIAGNOSIS — E03.9 ACQUIRED HYPOTHYROIDISM: ICD-10-CM

## 2017-08-01 NOTE — TELEPHONE ENCOUNTER
LEVOTHYROXINE SODIUM 75 MCG Tablet     Last Written Prescription Date: 5/25/2017  Last Quantity: 90, # refills: 0  Last Office Visit with G, P or [facility] prescribing provider: 6/22/2017        TSH   Date Value Ref Range Status   06/09/2017 0.63 0.40 - 4.00 mU/L Final

## 2017-08-02 RX ORDER — LEVOTHYROXINE SODIUM 75 UG/1
TABLET ORAL
Qty: 90 TABLET | Refills: 2 | Status: SHIPPED | OUTPATIENT
Start: 2017-08-02 | End: 2018-03-02

## 2017-10-02 ENCOUNTER — TELEPHONE (OUTPATIENT)
Dept: FAMILY MEDICINE | Facility: CLINIC | Age: 67
End: 2017-10-02

## 2017-10-02 NOTE — TELEPHONE ENCOUNTER
Reason for call:  Patient reporting a symptom    Symptom or request:   Patient has had stomach flu lie symptoms   Headache  Chills and nausea since Friday    She is scheduled for a colonoscopy tomorrow and wonders if she should follow through with this     Duration (how long have symptoms been present):    Since Friday    Have you been treated for this before? No    Additional comments:  Please call after 9:30  Phone Number patient can be reached at:  Home number on file [phone] (home)    Best Time:  After 9:30 today    Can we leave a detailed message on this number:  YES    Call taken on 10/2/2017 at 8:21 AM by BARRINGTON JAUREGUI

## 2017-10-02 NOTE — TELEPHONE ENCOUNTER
Spoke with patient - sheis going to call [facility] and postpone colonoscopy to next available.  She is on a 5 year call-back but doesn't feel she can finish the prep.  She will get in asap with them .    FYI only to Dr. Vale Wiseman

## 2017-12-05 ENCOUNTER — TRANSFERRED RECORDS (OUTPATIENT)
Dept: HEALTH INFORMATION MANAGEMENT | Facility: CLINIC | Age: 67
End: 2017-12-05

## 2017-12-14 ENCOUNTER — OFFICE VISIT (OUTPATIENT)
Dept: FAMILY MEDICINE | Facility: CLINIC | Age: 67
End: 2017-12-14
Payer: COMMERCIAL

## 2017-12-14 VITALS
HEART RATE: 69 BPM | OXYGEN SATURATION: 92 % | BODY MASS INDEX: 30.18 KG/M2 | HEIGHT: 62 IN | SYSTOLIC BLOOD PRESSURE: 120 MMHG | WEIGHT: 164 LBS | RESPIRATION RATE: 20 BRPM | DIASTOLIC BLOOD PRESSURE: 80 MMHG | TEMPERATURE: 97.1 F

## 2017-12-14 DIAGNOSIS — E78.00 HYPERCHOLESTEROLEMIA: ICD-10-CM
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
## 2017-12-14 DIAGNOSIS — S81.051A DOG BITE OF KNEE, RIGHT, INITIAL ENCOUNTER: Primary | ICD-10-CM

## 2017-12-14 DIAGNOSIS — F32.5 MAJOR DEPRESSION IN COMPLETE REMISSION (H): ICD-10-CM

## 2017-12-14 LAB
ALT SERPL W P-5'-P-CCNC: 27 U/L (ref 0–50)
AST SERPL W P-5'-P-CCNC: 32 U/L (ref 0–45)
CHOLEST SERPL-MCNC: 190 MG/DL
HDLC SERPL-MCNC: 120 MG/DL
LDLC SERPL CALC-MCNC: 59 MG/DL
NONHDLC SERPL-MCNC: 70 MG/DL
TRIGL SERPL-MCNC: 57 MG/DL

## 2017-12-14 PROCEDURE — 80061 LIPID PANEL: CPT | Performed by: FAMILY MEDICINE

## 2017-12-14 PROCEDURE — 84460 ALANINE AMINO (ALT) (SGPT): CPT | Performed by: FAMILY MEDICINE

## 2017-12-14 PROCEDURE — 36415 COLL VENOUS BLD VENIPUNCTURE: CPT | Performed by: FAMILY MEDICINE

## 2017-12-14 PROCEDURE — 84450 TRANSFERASE (AST) (SGOT): CPT | Performed by: FAMILY MEDICINE

## 2017-12-14 PROCEDURE — 99214 OFFICE O/P EST MOD 30 MIN: CPT | Performed by: FAMILY MEDICINE

## 2017-12-14 ASSESSMENT — ANXIETY QUESTIONNAIRES
3. WORRYING TOO MUCH ABOUT DIFFERENT THINGS: NOT AT ALL
7. FEELING AFRAID AS IF SOMETHING AWFUL MIGHT HAPPEN: NOT AT ALL
5. BEING SO RESTLESS THAT IT IS HARD TO SIT STILL: NOT AT ALL
GAD7 TOTAL SCORE: 1
1. FEELING NERVOUS, ANXIOUS, OR ON EDGE: NOT AT ALL
IF YOU CHECKED OFF ANY PROBLEMS ON THIS QUESTIONNAIRE, HOW DIFFICULT HAVE THESE PROBLEMS MADE IT FOR YOU TO DO YOUR WORK, TAKE CARE OF THINGS AT HOME, OR GET ALONG WITH OTHER PEOPLE: NOT DIFFICULT AT ALL
2. NOT BEING ABLE TO STOP OR CONTROL WORRYING: NOT AT ALL
6. BECOMING EASILY ANNOYED OR IRRITABLE: SEVERAL DAYS

## 2017-12-14 ASSESSMENT — PATIENT HEALTH QUESTIONNAIRE - PHQ9
SUM OF ALL RESPONSES TO PHQ QUESTIONS 1-9: 2
5. POOR APPETITE OR OVEREATING: NOT AT ALL

## 2017-12-14 NOTE — LETTER
[facility]  [address]  [place] [address]  [phone]                                                                                                           Alma Moore  [address] [address]    December 15, 2017      Dear Alma,    Results for orders placed or performed in visit on 12/14/17   ALT   Result Value Ref Range    ALT 27 0 - 50 U/L   AST   Result Value Ref Range    AST 32 0 - 45 U/L   Lipid panel reflex to direct LDL Fasting   Result Value Ref Range    Cholesterol 190 <200 mg/dL    Triglycerides 57 <150 mg/dL    HDL Cholesterol 120 >49 mg/dL    LDL Cholesterol Calculated 59 <100 mg/dL    Non HDL Cholesterol 70 <130 mg/dL     Please continue on the same medications     Looks good!!! Happy Holidays !!!     Soon all this stress will be over     Thank you for choosing [facility].  We appreciate the opportunity to serve you and look forward to supporting your healthcare needs in the future.    If you have any questions or concerns, please call me or my staff at [phone].      Sincerely,    Vale Wiseman MD

## 2017-12-14 NOTE — Clinical Note
Please abstract the following data from this visit with this patient into the appropriate field in Epic:  Colonoscopy done on this date: 12/05/2017 (approximately), by this group: NA, results were Negative.

## 2017-12-14 NOTE — PROGRESS NOTES
SUBJECTIVE:   Alma Moore is a 67 year old female who presents to clinic today for the following health issues:    DOG BITE      Duration: 12/11/17    Pit bull and pt has owner's no and the dog is fully immunized     Owner will pay for all     Broke the leash and knocked     Description (location/character/radiation): Right Knee    Intensity:  mild    Accompanying signs and symptoms: Pain, Puncture Wounds and Inflammation    History (similar episodes/previous evaluation): None    Precipitating or alleviating factors: None    Therapies tried and outcome: Ice, Elevation and Motrin/Some Relief    hAS cont'd to be active: dancing and walking      Hyperlipidemia Follow-Up      Rate your low fat/cholesterol diet?: good    Taking statin?  Yes, no muscle aches from 40mgm atorvastatin    Other lipid medications/supplements?:  None    6-17 lipids = wnl     Depression and Anxiety Follow-Up    Status since last visit: Worsened a few weeks ago and got sick from antidepressant of some unknown sort and has sought out counselling so is now in remission took only once the antidepressant they gave her as --> upset stomach     Other associated symptoms:None    Complicating factors:     Significant life event: Yes-  Busy with work and selling mother's house      Current substance abuse: None--did have 2 wine the nite prior to seeing oncol and became angry there so referred to psych     PHQ-9 Score and MyChart F/U Questions 4/8/2016 6/30/2016   Total Score 1 1   Q9: Suicide Ideation Not at all Not at all       PHQ-9  English=1  PHQ-9   Any Language  GAD7=2  Suicide Assessment Five-step Evaluation and Treatment (SAFE-T)          Problem list and histories reviewed & adjusted, as indicated.  Additional history: as documented    Labs reviewed in EPIC    Reviewed and updated as needed this visit by clinical staff     Reviewed and updated as needed this visit by Provider         ROS:  C: NEGATIVE for fever, chills, change in  "weight  I: NEGATIVE for worrisome rashes, moles or lesions  E: NEGATIVE for vision changes or irritation  E/M: NEGATIVE for ear, mouth and throat problems  R: NEGATIVE for significant cough or SOB  B: NEGATIVE for masses, tenderness or discharge  CV: NEGATIVE for chest pain, palpitations or peripheral edema  GI: NEGATIVE for nausea, abdominal pain, heartburn, or change in bowel habits  : NEGATIVE for frequency, dysuria, or hematuria  MUSCULOSKELETAL:POSITIVE  for  and joint stiffness and contusion / tender   N: NEGATIVE for weakness, dizziness or paresthesias  E: NEGATIVE for temperature intolerance, skin/hair changes  H: NEGATIVE for bleeding problems  P: NEGATIVE for changes in mood or affect    OBJECTIVE:     /80  Pulse 69  Temp 97.1  F (36.2  C) (Tympanic)  Resp 20  Ht 5' 2\" (1.575 m)  Wt 164 lb (74.4 kg)  LMP  (LMP Unknown)  SpO2 92%  Breastfeeding? No  BMI 30 kg/m2  Body mass index is 30 kg/(m^2).  GENERAL: healthy, alert and no distress  EYES: Eyes grossly normal to inspection, PERRL and conjunctivae and sclerae normal  RESP: lungs clear to auscultation - no rales, rhonchi or wheezes  MS: no gross musculoskeletal defects noted, no edema  SKIN: no suspicious lesions or rashes-blue entire ant surface of Rt knee without swelling -bluish discoloration pretib centrally on Rt -knee: 2 small healing puncture wounds tom inf lat and abrasion over the med sup patellar area--only slightly tender kell lat inf of patellar area   NEURO: Normal strength and tone, mentation intact and speech normal  PSYCH: mentation appears normal, affect normal/bright    Diagnostic Test Results:  none     ASSESSMENT/PLAN:               ICD-10-CM    1. Dog bite of knee, right, initial encounter DOI 12-11-17  S81.051A     W54.0XXA    2. Hypercholesterolemia E78.00 ALT     AST     Lipid panel reflex to direct LDL Fasting   3. Major depression in complete remission (H) on no meds  F32.5        PLAN     Reassured pt no int jt " damage   Cont to exercise   No need for rabies vax as dog was fully immunized       Vale Wiseman MD  [facility]

## 2017-12-14 NOTE — NURSING NOTE
"Chief Complaint   Patient presents with     Dog Bite     /80  Pulse 69  Temp 97.1  F (36.2  C) (Tympanic)  Resp 20  Ht 5' 2\" (1.575 m)  Wt 164 lb (74.4 kg)  LMP  (LMP Unknown)  SpO2 92%  Breastfeeding? No  BMI 30 kg/m2 Estimated body mass index is 30 kg/(m^2) as calculated from the following:    Height as of this encounter: 5' 2\" (1.575 m).    Weight as of this encounter: 164 lb (74.4 kg).  BP completed using cuff size: peter Cannon CMA    Health Maintenance Due   Topic Date Due     NOAH QUESTIONNAIRE 6 MONTHS  04/26/1968     COLONOSCOPY Q5 YR  10/19/2017     Health Maintenance reviewed at today's visit patient asked to schedule/complete:   Colon Cancer:  Patient agrees to schedule  Depression:  Patient agrees to schedule    "

## 2017-12-14 NOTE — MR AVS SNAPSHOT
"              After Visit Summary   2017    Alma Moore    MRN: [id number]           Patient Information     Date Of Birth          1950        Visit Information        Provider Department      2017 10:00 AM Vale Wiseman MD [facility]        Today's Diagnoses     Dog bite of knee, right, initial encounter DOI 12-11-17     -  1    Hypercholesterolemia        Major depression in complete remission (H) on no meds            Follow-ups after your visit        Who to contact     If you have questions or need follow up information about today's clinic visit or your schedule please contact [facility] directly at [phone].  Normal or non-critical lab and imaging results will be communicated to you by MyChart, letter or phone within 4 business days after the clinic has received the results. If you do not hear from us within 7 days, please contact the clinic through [employer] or phone. If you have a critical or abnormal lab result, we will notify you by phone as soon as possible.  Submit refill requests through [employer] or call your pharmacy and they will forward the refill request to us. Please allow 3 business days for your refill to be completed.          Additional Information About Your Visit        MyChart Information     [employer] lets you send messages to your doctor, view your test results, renew your prescriptions, schedule appointments and more. To sign up, go to [email or web address] . Click on \"Log in\" on the left side of the screen, which will take you to the Welcome page. Then click on \"Sign up Now\" on the right side of the page.     You will be asked to enter the access code listed below, as well as some personal information. Please follow the directions to create your username and password.     Your access code is: [email or web address]  Expires: 3/14/2018 10:45 AM     Your access code will  in 90 days. If you need " "help or a new code, please call your [facility] clinic or [phone].        Care EveryWhere ID     This is your Care EveryWhere ID. This could be used by other organizations to access your [facility] medical records  [id number]        Your Vitals Were     Pulse Temperature Respirations Height Last Period Pulse Oximetry    69 97.1  F (36.2  C) (Tympanic) 20 5' 2\" (1.575 m) (LMP Unknown) 92%    Breastfeeding? BMI (Body Mass Index)                No 30 kg/m2           Blood Pressure from Last 3 Encounters:   12/14/17 120/80   06/22/17 116/78   02/03/17 118/78    Weight from Last 3 Encounters:   12/14/17 164 lb (74.4 kg)   06/22/17 101 lb (45.8 kg)   02/03/17 107 lb (48.5 kg)              We Performed the Following     ALT     AST     DEPRESSION ACTION PLAN (DAP)     Lipid panel reflex to direct LDL Fasting        Primary Care Provider Office Phone # Fax #    Vale Luci Wiseman [phone]       [address] [address]        Equal Access to Services     [facility]: Hadii aad ku hadasho Soomaali, waaxda luqadaha, qaybta kaalmada adeegyada, wilfredo saavedran nayan poon . So [facility] [phone].    ATENCIÓN: Si habla español, tiene a ceballos disposición servicios gratuitos de asistencia lingüística. Llame al [phone].    We comply with applicable federal civil rights laws and [place] laws. We do not discriminate on the basis of race, color, national origin, age, disability, sex, sexual orientation, or gender identity.            Thank you!     Thank you for choosing [facility]  for your care. Our goal is always to provide you with excellent care. Hearing back from our patients is one way we can continue to improve our services. Please take a few minutes to complete the written survey that you may receive in the mail after your visit with us. Thank you!             Your Updated Medication List - Protect others around you: Learn how to " safely use, store and throw away your medicines at www.disposemymeds.org.          This list is accurate as of: 12/14/17 10:45 AM.  Always use your most recent med list.                   Brand Name Dispense Instructions for use Diagnosis    aspirin 81 MG tablet     90 tablet    Take 1 tablet (81 mg) by mouth daily    Routine general medical examination at a health care facility       atorvastatin 40 MG tablet    LIPITOR    90 tablet    TAKE 1 TABLET EVERY DAY    Hypercholesterolemia       EPIPEN 2-LISA 0.3 MG/0.3ML injection 2-pack   Generic drug:  EPINEPHrine     2 each    INJECT 0.3 MLS INTO THE MUSCLE ONCE AS NEEDED FOR ANAPHYLAXIS    Bee sting allergy       levothyroxine 75 MCG tablet    SYNTHROID/LEVOTHROID    90 tablet    TAKE 1 TABLET EVERY DAY (NEED LAB, PLEASE CALL AND MAKE A LAB-ONLY APPOINTMENT)    Acquired hypothyroidism       OMEGA-3 FISH OIL PO      Take 1 g by mouth 2 times daily (with meals).        typhoid CR capsule    VIVOTIF    4 capsule    Take 1 capsule by mouth every other day With the last dose at least 7 days prior to exposure    Travel advice encounter       VITAMIN D (CHOLECALCIFEROL) PO      Take 1,000 Units by mouth daily.

## 2017-12-14 NOTE — PROGRESS NOTES
.  Please see attached lab results  They are all normal     THE FOLLOWING ARE EXPLANATIONS OF SOME OF OUR LAB TESTS     YOU DID NOT NECESSARILY HAVE ALL OF THESE DONE     Hgb is the blood iron level  WBC means White Blood Cells  Platelets are small blood cells that help with forming the blood clots along with other blood factors.  Electrolytes are Sodium, Potassium, Calcium, Magnesium, Phosphorus.  Liver tests are: AST, ALT, Bilirubin, Alkaline Phosphatase.  Kidney tests are Creatinine, GFR.  HDL Cholesterol - is the good cholesterol and it is good to have it high.  LDL cholesterol is the bad cholesterol and it is good to have it low.  It is recommended to have LDL less than 130 for people with hypertension and to have it less than 100 for people with heart disease, diabetes and chronic kidney disease.  Triglycerides are another type of lipid that can cause heart disease, like the cholesterol and should be kept low   Thyroid tests are TSH, T4, T3  Glucose is sugar.  A1c is a test that gives us an idea about how well was controlled the diabetes for the last 3 months.   PSA stands for Prostate Specific Antigen and it can be elevated with prostate cancer or prostate inflammation.    Please continue on the same medications    Looks good!!! Happy Holidays !!!     Soon all this stress will be over

## 2017-12-14 NOTE — LETTER
My Depression Action Plan  Name: Alma Moore   Date of Birth 1950  Date: 12/14/2017    My doctor: Vale Wiseman   My clinic: [address] [address]  [phone]          GREEN    ZONE   Good Control    What it looks like:     Things are going generally well. You have normal up s and down s. You may even feel depressed from time to time, but bad moods usually last less than a day.   What you need to do:  1. Continue to care for yourself (see self care plan)  2. Check your depression survival kit and update it as needed  3. Follow your physician s recommendations including any medication.  4. Do not stop taking medication unless you consult with your physician first.           YELLOW         ZONE Getting Worse    What it looks like:     Depression is starting to interfere with your life.     It may be hard to get out of bed; you may be starting to isolate yourself from others.    Symptoms of depression are starting to last most all day and this has happened for several days.     You may have suicidal thoughts but they are not constant.   What you need to do:     1. Call your care team, your response to treatment will improve if you keep your care team informed of your progress. Yellow periods are signs an adjustment may need to be made.     2. Continue your self-care, even if you have to fake it!    3. Talk to someone in your support network    4. Open up your depression survival kit           RED    ZONE Medical Alert - Get Help    What it looks like:     Depression is seriously interfering with your life.     You may experience these or other symptoms: You can t get out of bed most days, can t work or engage in other necessary activities, you have trouble taking care of basic hygiene, or basic responsibilities, thoughts of suicide or death that will not go away, self-injurious behavior.     What  you need to do:  1. Call your care team and request a same-day appointment. If they are not available (weekends or after hours) call your local crisis line, emergency room or 911.      Electronically signed by: Vale Wiseman, December 14, 2017    Depression Self Care Plan / Survival Kit    Self-Care for Depression  Here s the deal. Your body and mind are really not as separate as most people think.  What you do and think affects how you feel and how you feel influences what you do and think. This means if you do things that people who feel good do, it will help you feel better.  Sometimes this is all it takes.  There is also a place for medication and therapy depending on how severe your depression is, so be sure to consult with your medical provider and/ or Behavioral Health Consultant if your symptoms are worsening or not improving.     In order to better manage my stress, I will:    Exercise  Get some form of exercise, every day. This will help reduce pain and release endorphins, the  feel good  chemicals in your brain. This is almost as good as taking antidepressants!  This is not the same as joining a gym and then never going! (they count on that by the way ) It can be as simple as just going for a walk or doing some gardening, anything that will get you moving.      Hygiene   Maintain good hygiene (Get out of bed in the morning, Make your bed, Brush your teeth, Take a shower, and Get dressed like you were going to work, even if you are unemployed).  If your clothes don't fit try to get ones that do.    Diet  I will strive to eat foods that are good for me, drink plenty of water, and avoid excessive sugar, caffeine, alcohol, and other mood-altering substances.  Some foods that are helpful in depression are: complex carbohydrates, B vitamins, flaxseed, fish or fish oil, fresh fruits and vegetables.    Psychotherapy  I agree to participate in Individual Therapy (if recommended).    Medication  If prescribed  medications, I agree to take them.  Missing doses can result in serious side effects.  I understand that drinking alcohol, or other illicit drug use, may cause potential side effects.  I will not stop my medication abruptly without first discussing it with my provider.    Staying Connected With Others  I will stay in touch with my friends, family members, and my primary care provider/team.    Use your imagination  Be creative.  We all have a creative side; it doesn t matter if it s oil painting, sand castles, or mud pies! This will also kick up the endorphins.    Witness Beauty  (AKA stop and smell the roses) Take a look outside, even in mid-winter. Notice colors, textures. Watch the squirrels and birds.     Service to others  Be of service to others.  There is always someone else in need.  By helping others we can  get out of ourselves  and remember the really important things.  This also provides opportunities for practicing all the other parts of the program.    Humor  Laugh and be silly!  Adjust your TV habits for less news and crime-drama and more comedy.    Control your stress  Try breathing deep, massage therapy, biofeedback, and meditation. Find time to relax each day.     My support system    Clinic Contact:  Phone number:    Contact 1:  Phone number:    Contact 2:  Phone number:    Anglican/:  Phone number:    Therapist:  Phone number:    [facility] crisis center:    Phone number:    Other community support:  Phone number:

## 2017-12-15 ASSESSMENT — ANXIETY QUESTIONNAIRES: GAD7 TOTAL SCORE: 1

## 2017-12-28 ENCOUNTER — OFFICE VISIT (OUTPATIENT)
Dept: FAMILY MEDICINE | Facility: CLINIC | Age: 67
End: 2017-12-28
Payer: COMMERCIAL

## 2017-12-28 VITALS
BODY MASS INDEX: 20.06 KG/M2 | WEIGHT: 109 LBS | SYSTOLIC BLOOD PRESSURE: 112 MMHG | DIASTOLIC BLOOD PRESSURE: 74 MMHG | HEIGHT: 62 IN | HEART RATE: 61 BPM | OXYGEN SATURATION: 99 % | RESPIRATION RATE: 12 BRPM | TEMPERATURE: 96.8 F

## 2017-12-28 DIAGNOSIS — S46.002A ROTATOR CUFF INJURY, LEFT, INITIAL ENCOUNTER: Primary | ICD-10-CM

## 2017-12-28 DIAGNOSIS — S46.912A STRAIN OF LEFT UPPER ARM, INITIAL ENCOUNTER: ICD-10-CM

## 2017-12-28 PROCEDURE — 99214 OFFICE O/P EST MOD 30 MIN: CPT | Performed by: FAMILY MEDICINE

## 2017-12-28 NOTE — MR AVS SNAPSHOT
After Visit Summary   12/28/2017    Alma Moore    MRN: [id number]           Patient Information     Date Of Birth          1950        Visit Information        Provider Department      12/28/2017 1:00 PM Vale Wiseman MD [facility]        Today's Diagnoses     Rotator cuff injury, left-nondominant , initial encounter 12-16-17    -  1    Strain of left upper arm, initial encounter 12-16-17          Care Instructions    1. ICE  decreases inflammation & kills the pain coming from the nerves     WARM SOAKS/PACKS  Relax & loosen up tight muscles to reduce the pain of the        muscle tightness & spasm              Follow-ups after your visit        Additional Services     [facility] PT, HAND, AND CHIROPRACTIC REFERRAL       **This order will print in the [facility] Scheduling Office**    Physical Therapy, Hand Therapy and Chiropractic Care are available through:    *[facility]  *[facility]  *[facility]    Call one number to schedule at any of the above locations: [phone].    Your provider has referred you to: As Indicated:     Indication/Reason for Referral:   Onset of Illness:   Therapy Orders: Evaluate and Treat  Special Programs:   Special Request:     Balaji Carpio      Additional Comments for the Therapist or Chiropractor:     Please be aware that coverage of these services is subject to the terms and limitations of your health insurance plan.  Call member services at your health plan with any benefit or coverage questions.      Please bring the following to your appointment:    *Your personal calendar for scheduling future appointments  *Comfortable clothing                  Who to contact     If you have questions or need follow up information about today's clinic visit or your schedule please contact [facility] directly at [phone].  Normal or non-critical lab  "and imaging results will be communicated to you by MyChart, letter or phone within 4 business days after the clinic has received the results. If you do not hear from us within 7 days, please contact the clinic through viblastt or phone. If you have a critical or abnormal lab result, we will notify you by phone as soon as possible.  Submit refill requests through iyzico or call your pharmacy and they will forward the refill request to us. Please allow 3 business days for your refill to be completed.          Additional Information About Your Visit        [employer] Information     iyzico lets you send messages to your doctor, view your test results, renew your prescriptions, schedule appointments and more. To sign up, go to [email or web address] . Click on \"Log in\" on the left side of the screen, which will take you to the Welcome page. Then click on \"Sign up Now\" on the right side of the page.     You will be asked to enter the access code listed below, as well as some personal information. Please follow the directions to create your username and password.     Your access code is: [email or web address]  Expires: 3/14/2018 10:45 AM     Your access code will  in 90 days. If you need help or a new code, please call your [facility] clinic or [phone].        Care EveryWhere ID     This is your Care EveryWhere ID. This could be used by other organizations to access your [facility] medical records  [id number]        Your Vitals Were     Pulse Temperature Respirations Height Last Period Pulse Oximetry    61 96.8  F (36  C) (Tympanic) 12 5' 2\" (1.575 m) (LMP Unknown) 99%    Breastfeeding? BMI (Body Mass Index)                No 19.94 kg/m2           Blood Pressure from Last 3 Encounters:   17 112/74   17 120/80   17 116/78    Weight from Last 3 Encounters:   17 109 lb (49.4 kg)   17 164 lb (74.4 kg)   17 101 lb (45.8 kg)              We Performed the Following     [facility] PT, HAND, AND " CHIROPRACTIC REFERRAL        Primary Care Provider Office Phone # Fax #    Vale Luci Wiseman [phone]       [address] [facility] [address]        Equal Access to Services     YUE CARROLL : Hadii judith ku hadlolyo Soomaali, waaxda luqadaha, qaybta kaalmada adeegyada, wilfredo arturo quentinn samanthamolly shieldsleilani kern. So [facility] [phone].    ATENCIÓN: Si habla español, tiene a ceballos disposición servicios gratuitos de asistencia lingüística. Llame al [phone].    We comply with applicable federal civil rights laws and [place] laws. We do not discriminate on the basis of race, color, national origin, age, disability, sex, sexual orientation, or gender identity.            Thank you!     Thank you for choosing [facility]  for your care. Our goal is always to provide you with excellent care. Hearing back from our patients is one way we can continue to improve our services. Please take a few minutes to complete the written survey that you may receive in the mail after your visit with us. Thank you!             Your Updated Medication List - Protect others around you: Learn how to safely use, store and throw away your medicines at www.disposemymeds.org.          This list is accurate as of: 12/28/17  1:19 PM.  Always use your most recent med list.                   Brand Name Dispense Instructions for use Diagnosis    aspirin 81 MG tablet     90 tablet    Take 1 tablet (81 mg) by mouth daily    Routine general medical examination at a health care facility       atorvastatin 40 MG tablet    LIPITOR    90 tablet    TAKE 1 TABLET EVERY DAY    Hypercholesterolemia       EPIPEN 2-LISA 0.3 MG/0.3ML injection 2-pack   Generic drug:  EPINEPHrine     2 each    INJECT 0.3 MLS INTO THE MUSCLE ONCE AS NEEDED FOR ANAPHYLAXIS    Bee sting allergy       levothyroxine 75 MCG tablet    SYNTHROID/LEVOTHROID    90 tablet    TAKE 1 TABLET EVERY DAY (NEED LAB, PLEASE CALL AND MAKE A  LAB-ONLY APPOINTMENT)    Acquired hypothyroidism       OMEGA-3 FISH OIL PO      Take 1 g by mouth 2 times daily (with meals).        typhoid CR capsule    VIVOTIF    4 capsule    Take 1 capsule by mouth every other day With the last dose at least 7 days prior to exposure    Travel advice encounter       VITAMIN D (CHOLECALCIFEROL) PO      Take 1,000 Units by mouth daily.

## 2017-12-28 NOTE — PROGRESS NOTES
"  SUBJECTIVE:   Alma Moore is a 67 year old female who presents to clinic today for the following health issues:    Musculoskeletal problem/pain:       Duration: since 12-16-17, a wk after the dog bite     No known injury/overuse except for the usual wt lifting 3# q hand     Did dance 12-15-17     Worse post heavy lift yesterday     Description  Location: Left Shoulder    Intensity:  moderate    Accompanying signs and symptoms: radiation of pain to Left Elbow    History  Previous similar problem: no   Previous evaluation:  none    Precipitating or alleviating factors:  Trauma or overuse: no   Aggravating factors include: overuse    Therapies tried and outcome: acetaminophen and Ibuprofen/No Relief        Problem list and histories reviewed & adjusted, as indicated.  Additional history: as documented    Labs reviewed in EPIC    Reviewed and updated as needed this [facility]  Meds  Problems       Reviewed and updated as needed this visit by Provider         ROS:  C: NEGATIVE for fever, chills, change in weight  I: NEGATIVE for worrisome rashes, moles or lesions  E: NEGATIVE for vision changes or irritation  E/M: NEGATIVE for ear, mouth and throat problems  R: NEGATIVE for significant cough or SOB  B: NEGATIVE for masses, tenderness or discharge  CV: NEGATIVE for chest pain, palpitations or peripheral edema  GI: NEGATIVE for nausea, abdominal pain, heartburn, or change in bowel habits  : NEGATIVE for frequency, dysuria, or hematuria  MUSCULOSKELETAL:POSITIVE  for  and joint stiffness --Lt upper arm-shoulder   N: NEGATIVE for weakness, dizziness or paresthesias  E: NEGATIVE for temperature intolerance, skin/hair changes  H: NEGATIVE for bleeding problems  P: NEGATIVE for changes in mood or affect    OBJECTIVE:     /74  Pulse 61  Temp 96.8  F (36  C) (Tympanic)  Resp 12  Ht 5' 2\" (1.575 m)  Wt 109 lb (49.4 kg)  LMP  (LMP Unknown)  SpO2 99%  Breastfeeding? No  BMI 19.94 " kg/m2  Body mass index is 19.94 kg/(m^2).  GENERAL: healthy, alert and no distress  EYES: Eyes grossly normal to inspection, PERRL and conjunctivae and sclerae normal  RESP: lungs clear to auscultation - no rales, rhonchi or wheezes  MS: no gross musculoskeletal defects noted, no edema--FROM ; tender ant rotator cuff   SKIN: no suspicious lesions or rashes  NEURO: Normal strength and tone, mentation intact and speech normal  PSYCH: mentation appears normal, affect normal/bright    Diagnostic Test Results:  none     ASSESSMENT/PLAN:               ICD-10-CM    1. Rotator cuff injury, left-nondominant , initial encounter 12-16-17 S46.002A [facility] PT, HAND, AND CHIROPRACTIC REFERRAL   2. Strain of left upper arm, initial encounter 12-16-17 S46.912A [facility] PT, HAND, AND CHIROPRACTIC REFERRAL       Patient Instructions   1. ICE  decreases inflammation & kills the pain coming from the nerves     WARM SOAKS/PACKS  Relax & loosen up tight muscles to reduce the pain of the        muscle tightness & spasm          Vale Wiseman MD  [facility]

## 2017-12-28 NOTE — PATIENT INSTRUCTIONS
1. ICE  decreases inflammation & kills the pain coming from the nerves     WARM SOAKS/PACKS  Relax & loosen up tight muscles to reduce the pain of the        muscle tightness & spasm

## 2017-12-28 NOTE — NURSING NOTE
"Chief Complaint   Patient presents with     Musculoskeletal Problem     /74  Pulse 61  Temp 96.8  F (36  C) (Tympanic)  Resp 12  Ht 5' 2\" (1.575 m)  Wt 109 lb (49.4 kg)  LMP  (LMP Unknown)  SpO2 99%  Breastfeeding? No  BMI 19.94 kg/m2 Estimated body mass index is 19.94 kg/(m^2) as calculated from the following:    Height as of this encounter: 5' 2\" (1.575 m).    Weight as of this encounter: 109 lb (49.4 kg).  BP completed using cuff size: regular   Nadya Cannon CMA    There are no preventive care reminders to display for this patient.  Health Maintenance reviewed at today's visit patient asked to schedule/complete:   None, Health Maintenance up to date.    "

## 2018-01-02 ENCOUNTER — THERAPY VISIT (OUTPATIENT)
Dept: PHYSICAL THERAPY | Facility: CLINIC | Age: 68
End: 2018-01-02
Payer: COMMERCIAL

## 2018-01-02 DIAGNOSIS — M25.522 LEFT ELBOW PAIN: ICD-10-CM

## 2018-01-02 DIAGNOSIS — M25.512 SHOULDER PAIN, LEFT: Primary | ICD-10-CM

## 2018-01-02 PROCEDURE — 97110 THERAPEUTIC EXERCISES: CPT | Mod: GP | Performed by: PHYSICAL THERAPIST

## 2018-01-02 PROCEDURE — 97161 PT EVAL LOW COMPLEX 20 MIN: CPT | Mod: GP | Performed by: PHYSICAL THERAPIST

## 2018-01-02 NOTE — PROGRESS NOTES
[facility] Initial Evaluation  Subjective:  Patient is a 67 year old female presenting with rehab left shoulder hpi.   Alma Moore is a 67 year old female with a left shoulder condition.  Condition occurred with:  Unknown cause.  Condition occurred: for unknown reasons.  This is a new condition  Patient reports that she started to have left shoulder and elbow pain since about 12/16/17.   Hobbies: folk dancing, walking.    Pain is lateral and posterior in the shoulder and lateral elbow pain.  Been having some neck pain and history of disc problem in the neck.      .        Pain is described as aching, shooting and sharp and is intermittent and reported as 5/10.     Symptoms are exacerbated by using arm overhead, using arm at shoulder level, using arm behind back, lifting and carrying (straightening the arm, elbow.  ) and relieved by rest.  Since onset symptoms are unchanged.        General health as reported by patient is good.  Pertinent medical history includes:  Osteoarthritis, osteoporosis, cancer, thyroid problems and menopausal.  Medical allergies: no.  Other surgeries include:  Other.  Current medications:  Thyroid medication.  Current occupation is retired.        Barriers include:  None as reported by the patient.    Red flags:  None as reported by the patient.                        Objective:  System                   Shoulder Evaluation:  ROM:  AROM:  normal (pain at end range in all directions)                                  Strength:    Flexion: Left:4+/5 Strong/painful    Pain:    Right: 5/5     Pain:   Extension:  Left: 4+/5    Pain:    Right: 5/5    Pain:  Abduction:  Left: 4-/5  Pain:    Right: 5/5     Pain:  Adduction:  Left: 5/5    Pain:    Right: 5/5     Pain:  Internal Rotation:  Left:4+/5     Pain:    Right: 5/5     Pain:  External Rotation:   Left:4+/5     Pain:   Right:5/5     Pain:    Horizontal Abduction:  Left:4-/5     Pain:    Right:5/5    Pain:  Horizontal  Adduction:  Left:5/5     Pain:    Right:5/5     Pain:                 ROM:  AROM:  normal                          Strength:    Flexion Elbow:  Left: 5/5 Pain:    Right: 5/5 Pain:  Extension Elbow: Left: 5/5 Pain:    Right: 5/5 Pain:  Flexion Wrist:  Left: 5/5 Pain:    Right: 5/5 Pain:  Extension Wrist: Left: 4+/5 Strong/painful  Pain:  Right: 5/5 Pain:  Supination Elbow/Wrist: Left: 4+/5 Strong/painful  Pain:    Right: 5/5 Pain:  Pronation Elbow/Wrist: Left: 5/5 Pain:        Right: 5/5 Pain:  Radial Deviation:  Left: 4+/5 Strong/painful  Pain:    Right: 5/5 Pain:  Ulnar Deviation: Left: 5/5 Pain:    Right: 5/5 Pain:                                      General     ROS    Assessment/Plan:    Patient is a 67 year old female with left side shoulder and left side elbow complaints.    Patient has the following significant findings with corresponding treatment plan.                Diagnosis 1:  Left shoulder pain and lateral elbow pain  Pain -  hot/cold therapy, US, manual therapy, self management, education, directional preference exercise and home program  Decreased ROM/flexibility - manual therapy, therapeutic exercise and home program  Decreased strength - therapeutic exercise, therapeutic activities and home program  Impaired muscle performance - neuro re-education and home program  Decreased function - therapeutic activities and home program  Impaired posture - neuro re-education and home program    Therapy Evaluation Codes:   1) History comprised of:   Personal factors that impact the plan of care:      None.    Comorbidity factors that impact the plan of care are:      None.     Medications impacting care: None.  2) Examination of Body Systems comprised of:   Body structures and functions that impact the plan of care:      Elbow and Shoulder.   Activity limitations that impact the plan of care are:      Cooking, Grasping and Lifting.  3) Clinical presentation characteristics  are:   Stable/Uncomplicated.  4) Decision-Making    Low complexity using standardized patient assessment instrument and/or measureable assessment of functional outcome.  Cumulative Therapy Evaluation is: Low complexity.    Previous and current functional limitations:  (See Goal Flow Sheet for this information)    Short term and Long term goals: (See Goal Flow Sheet for this information)     Communication ability:  Patient appears to be able to clearly communicate and understand verbal and written communication and follow directions correctly.  Treatment Explanation - The following has been discussed with the patient:   RX ordered/plan of care  Anticipated outcomes  Possible risks and side effects  This patient would benefit from PT intervention to resume normal activities.   Rehab potential is excellent.    Frequency:  1 X week, once daily  Duration:  for 6 weeks  Discharge Plan:  Achieve all LTG.  Independent in home treatment program.  Reach maximal therapeutic benefit.    Please refer to the daily flowsheet for treatment today, total treatment time and time spent performing 1:1 timed codes.

## 2018-01-08 ENCOUNTER — TELEPHONE (OUTPATIENT)
Dept: FAMILY MEDICINE | Facility: CLINIC | Age: 68
End: 2018-01-08

## 2018-01-08 NOTE — TELEPHONE ENCOUNTER
Patient called reporting left shoulder pain better from appointment but not healed, wants to know if she should do yoga tonight given her shoulder pain.     Writer advised to not go if it still hurts, and would send a message to the PCP for any further advice, if still warranted/needed.     Joint Pain    Onset: seen on 12/28/17 for it, better now    Description:   Location: left shoulder  Character: Dull ache    Intensity: mild    Progression of Symptoms: better    Accompanying Signs & Symptoms:  Other symptoms: none    History:   Previous similar pain: no       Precipitating factors:   Trauma or overuse: no     Alleviating factors:  Improved by: rest/inactivity and ice    Therapies Tried and outcome: ice and rest and it is getting better, but still hurts           Recent Medication changes: none    Home Care information given: Skip Yoga tonight and wait for the provider's response on a reasonable expectation of healing time.   Advised: Follow up with clinic if: not applicable.      References used: Telephone Triage Protocols for Nurses fifth edition       Please advise as appropriate with further recommendations as appropriate.    Marilu Thompson, RN  Triage RN  [facility] [place] [facility]

## 2018-01-09 NOTE — TELEPHONE ENCOUNTER
Called pt and she will continue to do PT after she goes to [place] to see a cousin who has fallen ill and may not make it. Her next appt is 1/24/18.

## 2018-01-24 ENCOUNTER — THERAPY VISIT (OUTPATIENT)
Dept: PHYSICAL THERAPY | Facility: CLINIC | Age: 68
End: 2018-01-24
Payer: COMMERCIAL

## 2018-01-24 DIAGNOSIS — M25.522 LEFT ELBOW PAIN: ICD-10-CM

## 2018-01-24 DIAGNOSIS — M25.512 SHOULDER PAIN, LEFT: ICD-10-CM

## 2018-01-24 PROCEDURE — 97110 THERAPEUTIC EXERCISES: CPT | Mod: GP | Performed by: PHYSICAL THERAPIST

## 2018-01-24 PROCEDURE — 97140 MANUAL THERAPY 1/> REGIONS: CPT | Mod: GP | Performed by: PHYSICAL THERAPIST

## 2018-02-07 ENCOUNTER — THERAPY VISIT (OUTPATIENT)
Dept: PHYSICAL THERAPY | Facility: CLINIC | Age: 68
End: 2018-02-07
Payer: COMMERCIAL

## 2018-02-07 DIAGNOSIS — M25.512 SHOULDER PAIN, LEFT: ICD-10-CM

## 2018-02-07 DIAGNOSIS — M25.522 LEFT ELBOW PAIN: ICD-10-CM

## 2018-02-07 PROCEDURE — 97140 MANUAL THERAPY 1/> REGIONS: CPT | Mod: GP | Performed by: PHYSICAL THERAPIST

## 2018-02-07 PROCEDURE — 97110 THERAPEUTIC EXERCISES: CPT | Mod: GP | Performed by: PHYSICAL THERAPIST

## 2018-02-07 PROCEDURE — 97035 APP MDLTY 1+ULTRASOUND EA 15: CPT | Mod: GP | Performed by: PHYSICAL THERAPIST

## 2018-02-07 NOTE — MR AVS SNAPSHOT
"              After Visit Summary   2018    Alma Moore    MRN: [id number]           Patient Information     Date Of Birth          1950        Visit Information        Provider Department      2018 1:20 PM Rudy Muniz PT [facility]        Today's Diagnoses     Shoulder pain, left        Left elbow pain           Follow-ups after your visit        Who to contact     If you have questions or need follow up information about today's clinic visit or your schedule please contact [facility] directly at [phone].  Normal or non-critical lab and imaging results will be communicated to you by [employer] Corporation[employer], letter or phone within 4 business days after the clinic has received the results. If you do not hear from us within 7 days, please contact the clinic through Transit Appt or phone. If you have a critical or abnormal lab result, we will notify you by phone as soon as possible.  Submit refill requests through [employer] or call your pharmacy and they will forward the refill request to us. Please allow 3 business days for your refill to be completed.          Additional Information About Your Visit        MyChart Information     [employer] lets you send messages to your doctor, view your test results, renew your prescriptions, schedule appointments and more. To sign up, go to [email or web address] . Click on \"Log in\" on the left side of the screen, which will take you to the Welcome page. Then click on \"Sign up Now\" on the right side of the page.     You will be asked to enter the access code listed below, as well as some personal information. Please follow the directions to create your username and password.     Your access code is: [email or web address]  Expires: 3/14/2018 10:45 AM     Your access code will  in 90 days. If you need help or a new code, please call your [facility] clinic or [phone].        Care EveryWhere ID     This is your Care EveryWhere ID. " This could be used by other organizations to access your [facility] medical records  [id number]        Your Vitals Were     Last Period                   (LMP Unknown)            Blood Pressure from Last 3 Encounters:   12/28/17 112/74   12/14/17 120/80   06/22/17 116/78    Weight from Last 3 Encounters:   12/28/17 49.4 kg (109 lb)   12/14/17 74.4 kg (164 lb)   06/22/17 45.8 kg (101 lb)              We Performed the Following     MANUAL THER TECH,1+REGIONS,EA 15 MIN     THERAPEUTIC EXERCISES     ULTRASOUND THERAPY        Primary Care Provider Office Phone # Fax #    Vale Luci Wiseman [phone] [address] [facility] [address]        Equal Access to Services     YUE CARROLL : Hadii aad ku hadasho Sojakob, waaxda luqadaha, qaybta kaalmada adeegyada, wilfredo poon . So [facility] [phone].    ATENCIÓN: Si habla español, tiene a ceballos disposición servicios gratuitos de asistencia lingüística. [facility] [phone].    We comply with applicable federal civil rights laws and [place] laws. We do not discriminate on the basis of race, color, national origin, age, disability, sex, sexual orientation, or gender identity.            Thank you!     Thank you for choosing [facility]  for your care. Our goal is always to provide you with excellent care. Hearing back from our patients is one way we can continue to improve our services. Please take a few minutes to complete the written survey that you may receive in the mail after your visit with us. Thank you!             Your Updated Medication List - Protect others around you: Learn how to safely use, store and throw away your medicines at www.disposemymeds.org.          This list is accurate as of 2/7/18  2:10 PM.  Always use your most recent med list.                   Brand Name Dispense Instructions for use Diagnosis    aspirin 81 MG tablet     90 tablet    Take 1 tablet (81 mg) by  mouth daily    Routine general medical examination at a health care facility       atorvastatin 40 MG tablet    LIPITOR    90 tablet    TAKE 1 TABLET EVERY DAY    Hypercholesterolemia       EPIPEN 2-LISA 0.3 MG/0.3ML injection 2-pack   Generic drug:  EPINEPHrine     2 each    INJECT 0.3 MLS INTO THE MUSCLE ONCE AS NEEDED FOR ANAPHYLAXIS    Bee sting allergy       levothyroxine 75 MCG tablet    SYNTHROID/LEVOTHROID    90 tablet    TAKE 1 TABLET EVERY DAY (NEED LAB, PLEASE CALL AND MAKE A LAB-ONLY APPOINTMENT)    Acquired hypothyroidism       OMEGA-3 FISH OIL PO      Take 1 g by mouth 2 times daily (with meals).        typhoid CR capsule    VIVOTIF    4 capsule    Take 1 capsule by mouth every other day With the last dose at least 7 days prior to exposure    Travel advice encounter       VITAMIN D (CHOLECALCIFEROL) PO      Take 1,000 Units by mouth daily.

## 2018-02-15 ENCOUNTER — OFFICE VISIT (OUTPATIENT)
Dept: FAMILY MEDICINE | Facility: CLINIC | Age: 68
End: 2018-02-15
Payer: COMMERCIAL

## 2018-02-15 ENCOUNTER — TELEPHONE (OUTPATIENT)
Dept: FAMILY MEDICINE | Facility: CLINIC | Age: 68
End: 2018-02-15

## 2018-02-15 VITALS
DIASTOLIC BLOOD PRESSURE: 80 MMHG | WEIGHT: 108 LBS | HEART RATE: 69 BPM | RESPIRATION RATE: 16 BRPM | HEIGHT: 62 IN | BODY MASS INDEX: 19.88 KG/M2 | OXYGEN SATURATION: 100 % | SYSTOLIC BLOOD PRESSURE: 112 MMHG

## 2018-02-15 DIAGNOSIS — S09.90XA TRAUMATIC INJURY OF HEAD, INITIAL ENCOUNTER: Primary | ICD-10-CM

## 2018-02-15 PROCEDURE — 99213 OFFICE O/P EST LOW 20 MIN: CPT | Performed by: FAMILY MEDICINE

## 2018-02-15 NOTE — MR AVS SNAPSHOT
"              After Visit Summary   2/15/2018    Alma Moore    MRN: [id number]           Patient Information     Date Of Birth          1950        Visit Information        Provider Department      2/15/2018 3:30 PM William Gill MD [facility] [place]        Today's Diagnoses     Traumatic injury of head, initial encounter    -  1      Care Instructions    I will have the patient take 2 ibuprofen tablets 3 times daily for the next 2 weeks.  She can continue to take acetaminophen p.m. at bedtime.  She will follow-up if not improving.  We briefly discussed the concussion triage/clinic but I do not think that is going to be necessary in her case.          Follow-ups after your visit        Who to contact     If you have questions or need follow up information about today's clinic visit or your schedule please contact [facility] directly at [phone].  Normal or non-critical lab and imaging results will be communicated to you by MyChart, letter or phone within 4 business days after the clinic has received the results. If you do not hear from us within 7 days, please contact the clinic through [email or web address] or phone. If you have a critical or abnormal lab result, we will notify you by phone as soon as possible.  Submit refill requests through [facility] or call your pharmacy and they will forward the refill request to us. Please allow 3 business days for your refill to be completed.          Additional Information About Your Visit        MyChart Information     [facility]pass lets you send messages to your doctor, view your test results, renew your prescriptions, schedule appointments and more. To sign up, go to [email or web address] . Click on \"Log in\" on the left side of the screen, which will take you to the Welcome page. Then click on \"Sign up Now\" on the right side of the page.     You will be asked to enter the access code listed below, as " "well as some personal information. Please follow the directions to create your username and password.     Your access code is: [email or web address]  Expires: 3/14/2018 10:45 AM     Your access code will  in 90 days. If you need help or a new code, please call your [place] clinic or [phone].        Care EveryWhere ID     This is your Care EveryWhere ID. This could be used by other organizations to access your [place] medical records  [id number]        Your Vitals Were     Pulse Respirations Height Last Period Pulse Oximetry BMI (Body Mass Index)    69 16 5' 2\" (1.575 m) (LMP Unknown) 100% 19.75 kg/m2       Blood Pressure from Last 3 Encounters:   02/15/18 112/80   17 112/74   17 120/80    Weight from Last 3 Encounters:   02/15/18 108 lb (49 kg)   17 109 lb (49.4 kg)   17 164 lb (74.4 kg)              Today, you had the following     No orders found for display       Primary Care Provider Office Phone # Fax #    Vale Luci Wiseman [phone]       [address] [facility] [address]        Equal Access to Services     FRANNIE CARROLL : Hadii aad ku hadasho Soomaali, waaxda luqadaha, qaybta kaalmada adeegyada, waxay arturo saavedran nayan poon . So [facility] [phone].    ATENCIÓN: Si habla español, tiene a ceballos disposición servicios gratuitos de asistencia lingüística. Llame al [phone].    We comply with applicable federal civil rights laws and [place] laws. We do not discriminate on the basis of race, color, national origin, age, disability, sex, sexual orientation, or gender identity.            Thank you!     Thank you for choosing [facility]  for your care. Our goal is always to provide you with excellent care. Hearing back from our patients is one way we can continue to improve our services. Please take a few minutes to complete the written survey that you may receive in the mail after your visit with us. Thank " you!             Your Updated Medication List - Protect others around you: Learn how to safely use, store and throw away your medicines at www.disposemymeds.org.          This list is accurate as of 2/15/18  4:30 PM.  Always use your most recent med list.                   Brand Name Dispense Instructions for use Diagnosis    aspirin 81 MG tablet     90 tablet    Take 1 tablet (81 mg) by mouth daily    Routine general medical examination at a health care facility       atorvastatin 40 MG tablet    LIPITOR    90 tablet    TAKE 1 TABLET EVERY DAY    Hypercholesterolemia       EPIPEN 2-LISA 0.3 MG/0.3ML injection 2-pack   Generic drug:  EPINEPHrine     2 each    INJECT 0.3 MLS INTO THE MUSCLE ONCE AS NEEDED FOR ANAPHYLAXIS    Bee sting allergy       levothyroxine 75 MCG tablet    SYNTHROID/LEVOTHROID    90 tablet    TAKE 1 TABLET EVERY DAY (NEED LAB, PLEASE CALL AND MAKE A LAB-ONLY APPOINTMENT)    Acquired hypothyroidism       OMEGA-3 FISH OIL PO      Take 1 g by mouth 2 times daily (with meals).        typhoid CR capsule    VIVOTIF    4 capsule    Take 1 capsule by mouth every other day With the last dose at least 7 days prior to exposure    Travel advice encounter       VITAMIN D (CHOLECALCIFEROL) PO      Take 1,000 Units by mouth daily.

## 2018-02-15 NOTE — PROGRESS NOTES
SUBJECTIVE:   Alma Moore is a 67 year old female who presents to clinic today for the following health issues:      Concussion      Duration: about a month ago when she ran into a closed door hitting her nose and cutting it and hitting her forehead on the door.  She was fine the next day and 2 days later he developed a mild headache which she has had intermittently since the accident on January 14.      Description (location/character/radiation): pt had a mild concussion and keeps having sx on and off    Intensity:  moderate    Accompanying signs and symptoms: none    History (similar episodes/previous evaluation): None    Precipitating or alleviating factors: None    Therapies tried and outcome: None           Problem list and histories reviewed & adjusted, as indicated.  Additional history: Patient has a history of concussions in the past.    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      Encounter for long-term current use of medication     Hypercholesterolemia     Osteoporosis     ACP (advance care planning)     Acquired hypothyroidism     Concussion without loss of consciousness, initial encounter 9-17-16     Tension headache since concussion 9-16      Adjustment disorder with anxious mood     Simple phobia     Balance problems from chemo 2013      Falls frequently causing recurrent head injuries      PVC's (premature ventricular contractions)     Protein-calorie malnutrition (H)     Major depression in complete remission (H) on no meds      Rotator cuff injury, left-nondominant , initial encounter 12-16-17     Strain of left upper  arm, initial encounter 12-16-17     Shoulder pain, left     Left elbow pain     Past Surgical History:   Procedure Laterality Date     CL AFF SURGICAL PATHOLOGY      removed from back     HC TOOTH EXTRACTION W/FORCEP       HYSTERECTOMY TOTAL ABDOMINAL, BILATERAL SALPINGO-OOPHORECTOMY, COMBINED  9/17/2012    Procedure: COMBINED HYSTERECTOMY TOTAL ABDOMINAL, SALPINGO-OOPHORECTOMY;  TOTAL ABDOMINAL HYSTERECTOMY, BILATERAL SALPINGO OOPHORECTOMY ;  Surgeon: Devyn Ha MD;  Location: [facility]     HYSTERECTOMY, PAP NO LONGER INDICATED  09/17/2012     LAPAROTOMY, STAGING, COMBINED  9/17/2012    Procedure: COMBINED LAPAROTOMY, STAGING;;  Surgeon: Devyn Ha MD;  Location: [facility]     ORTHOPEDIC SURGERY  2009     Right hammertoe and bunionectomy       Social History   Substance Use Topics     Smoking status: Never Smoker     Smokeless tobacco: Never Used     Alcohol use 0.0 oz/week     0 Standard drinks or equivalent per week     Family History   Problem Relation Age of Onset     Hypertension Mother      OSTEOPOROSIS Mother      Arthritis Mother      Unknown/Adopted Father      DIABETES No family hx of      Coronary Artery Disease No family hx of      Hyperlipidemia No family hx of      CEREBROVASCULAR DISEASE No family hx of      Breast Cancer No family hx of      Colon Cancer No family hx of      Prostate Cancer No family hx of      Other Cancer No family hx of      Depression No family hx of      Anxiety Disorder No family hx of      MENTAL ILLNESS No family hx of      Substance Abuse No family hx of      Anesthesia Reaction No family hx of      Asthma No family hx of      Genetic Disorder No family hx of      Thyroid Disease No family hx of      Obesity No family hx of            Reviewed and updated as needed this visit by clinical staff       Reviewed and updated as needed this visit by Provider         ROS:  Constitutional, HEENT, cardiovascular, pulmonary, gi and gu systems are negative, except as otherwise  "noted.  CONSTITUTIONAL:NEGATIVE for fever, chills, change in weight  EYES: NEGATIVE for vision changes or irritation and blurred vision   NEURO: NEGATIVE for weakness, dizziness or paresthesias, gait disturbance, loss of consciousness and memory problems    OBJECTIVE:                                                    /80  Pulse 69  Resp 16  Ht 5' 2\" (1.575 m)  Wt 108 lb (49 kg)  LMP  (LMP Unknown)  SpO2 100%  BMI 19.75 kg/m2  Body mass index is 19.75 kg/(m^2).  GENERAL APPEARANCE: healthy, alert and no distress  EYES: Eyes grossly normal to inspection, PERRL and conjunctivae and sclerae normal  HENT: nose and mouth without ulcers or lesions  NECK: no adenopathy and no asymmetry, masses, or scars  NEURO: Normal strength and tone, mentation intact, speech normal, DTR symmetrically normal in lower extremities, cranial nerves 2-12 intact, Romberg negative and normal gait         ASSESSMENT/PLAN:                                                    No diagnosis found.    There are no Patient Instructions on file for this visit.    William Gill MD  [facility]  "

## 2018-02-15 NOTE — NURSING NOTE
"Chief Complaint   Patient presents with     Head Injury       Initial /80  Pulse 69  Resp 16  Ht 5' 2\" (1.575 m)  Wt 108 lb (49 kg)  LMP  (LMP Unknown)  SpO2 100%  BMI 19.75 kg/m2 Estimated body mass index is 19.75 kg/(m^2) as calculated from the following:    Height as of this encounter: 5' 2\" (1.575 m).    Weight as of this encounter: 108 lb (49 kg).  Medication Reconciliation: seth Ramirez CMA      "

## 2018-02-15 NOTE — TELEPHONE ENCOUNTER
Patient called the clinic reporting a concussion which happened 1 month ago. Pt was in a hotel, accidentally bashed into a glass door in a dark hallway. She had a small abrasion on the nose from glasses, but otherwise felt fine. She continued with her vacation, drinking, and flying. Symptoms have been getting better, but some days are worse with headaches, sometimes dizzy, and sometimes nauseated. She is taking buprofen for headache, at night taking advil pm, and pain relief pm.    NURSING PLAN: Nursing advice to patient given    RECOMMENDED DISPOSITION:  See in 4 hours, another person to drive - OV advised and scheduled for today.   Will comply with recommendation: Yes  If further questions/concerns or if symptoms do not improve, worsen or new symptoms develop, call your PCP or [facility] as soon as possible.      Guideline used:  Telephone Triage Protocols for Nurses, Fifth Edition, Maria Dolores Renteria RN

## 2018-02-15 NOTE — PATIENT INSTRUCTIONS
I will have the patient take 2 ibuprofen tablets 3 times daily for the next 2 weeks.  She can continue to take acetaminophen p.m. at bedtime.  She will follow-up if not improving.  We briefly discussed the concussion triage/clinic but I do not think that is going to be necessary in her case.

## 2018-02-22 ENCOUNTER — TELEPHONE (OUTPATIENT)
Dept: FAMILY MEDICINE | Facility: CLINIC | Age: 68
End: 2018-02-22

## 2018-02-22 NOTE — TELEPHONE ENCOUNTER
Reason for Call: Request for an order or referral:    Order or referral being requested: [place] antibiotic [place] for gastic infection in case needs it    Date needed: at your convenience    Has the patient been seen by the PCP for this problem? NO    Additional comments: call pt    Phone number Patient can be reached at:  Home number on file [phone] (home)    Best Time:      Can we leave a detailed message on this number?  YES    Call taken on 2/22/2018 at 2:09 PM by WILLIS HARDING

## 2018-02-22 NOTE — TELEPHONE ENCOUNTER
Pt is traveling to [place], [place] and [facility] 03/20/18 for 16 days. She received email from travelers guide advising travelers have antibiotic for GI problems. Please advice on request. Preferred pharmacy pended.

## 2018-02-26 DIAGNOSIS — Z91.89 AT RISK FOR INFECTIOUS DISEASE DUE TO RECENT FOREIGN TRAVEL: Primary | ICD-10-CM

## 2018-02-26 RX ORDER — CIPROFLOXACIN 500 MG/1
500 TABLET, FILM COATED ORAL 2 TIMES DAILY
Qty: 6 TABLET | Refills: 0 | Status: SHIPPED | OUTPATIENT
Start: 2018-02-26 | End: 2018-09-25

## 2018-05-08 DIAGNOSIS — E03.9 ACQUIRED HYPOTHYROIDISM: ICD-10-CM

## 2018-05-08 DIAGNOSIS — Z91.030 BEE STING ALLERGY: ICD-10-CM

## 2018-05-09 NOTE — TELEPHONE ENCOUNTER
"Requested Prescriptions   Pending Prescriptions Disp Refills     levothyroxine (SYNTHROID/LEVOTHROID) 75 MCG tablet [Pharmacy Med Name: LEVOTHYROXINE SODIUM 75 MCG Tablet] 90 tablet 0    Last Written Prescription Date:  03/05/2018  Last Fill Quantity: 90,  # refills: 0   Last office visit: 2/15/2018 with prescribing provider:  William Gill  Future Office Visit:   Sig: TAKE 1 TABLET EVERY DAY    Thyroid Protocol Passed    5/9/2018 10:53 AM       Passed - Patient is 12 years or older       Passed - Recent (12 mo) or future (30 days) visit within the authorizing provider's specialty    Patient had office visit in the last 12 months or has a visit in the next 30 days with authorizing provider or within the authorizing provider's specialty.  See \"Patient Info\" tab in inbasket, or \"Choose Columns\" in Meds & Orders section of the refill encounter.           Passed - Normal TSH on file in past 12 months    Recent Labs   Lab Test  06/09/17   1322   TSH  0.63             Passed - No active pregnancy on record    If patient is pregnant or has had a positive pregnancy test, please check TSH.         Passed - No positive pregnancy test in past 12 months    If patient is pregnant or has had a positive pregnancy test, please check TSH.          EPINEPHrine (EPIPEN 2-LISA) 0.3 MG/0.3ML injection 2-pack      Anaphylaxis Kits Protocol Passed    5/9/2018 10:53 AM       Passed - Recent (12 mo) or future (30 days) visit witin the authorizing provider's specialty    Patient had office visit in the last 12 months or has a visit in the next 30 days with authorizing provider or within the authorizing provider's specialty.  See \"Patient Info\" tab in inbasket, or \"Choose Columns\" in Meds & Orders section of the refill encounter.           Passed - Patient is age 5 or older        Requested Prescriptions   Pending Prescriptions Disp Refills     levothyroxine (SYNTHROID/LEVOTHROID) 75 MCG tablet [Pharmacy Med Name: LEVOTHYROXINE SODIUM 75 MCG " "Tablet] 90 tablet 0     Sig: TAKE 1 TABLET EVERY DAY    Thyroid Protocol Passed    5/9/2018 10:53 AM       Passed - Patient is 12 years or older       Passed - Recent (12 mo) or future (30 days) visit within the authorizing provider's specialty    Patient had office visit in the last 12 months or has a visit in the next 30 days with authorizing provider or within the authorizing provider's specialty.  See \"Patient Info\" tab in inbasket, or \"Choose Columns\" in Meds & Orders section of the refill encounter.           Passed - Normal TSH on file in past 12 months    Recent Labs   Lab Test  06/09/17   1322   TSH  0.63             Passed - No active pregnancy on record    If patient is pregnant or has had a positive pregnancy test, please check TSH.         Passed - No positive pregnancy test in past 12 months    If patient is pregnant or has had a positive pregnancy test, please check TSH.          EPINEPHrine (EPIPEN 2-LISA) 0.3 MG/0.3ML injection 2-pack      Anaphylaxis Kits Protocol Passed    5/9/2018 10:53 AM.Last Written Prescription Date:  08/18/2015  Last Fill Quantity: 2,  # refills: 3   Last office visit: 2/15/2018 with prescribing provider:  Dr. Gill   Future Office Visit:         Passed - Recent (12 mo) or future (30 days) visit witin the authorizing provider's specialty    Patient had office visit in the last 12 months or has a visit in the next 30 days with authorizing provider or within the authorizing provider's specialty.  See \"Patient Info\" tab in inbasket, or \"Choose Columns\" in Meds & Orders section of the refill encounter.           Passed - Patient is age 5 or older        "

## 2018-05-10 RX ORDER — LEVOTHYROXINE SODIUM 75 UG/1
TABLET ORAL
Qty: 90 TABLET | Refills: 0 | Status: SHIPPED | OUTPATIENT
Start: 2018-05-10 | End: 2018-07-11

## 2018-05-10 RX ORDER — EPINEPHRINE 0.3 MG/.3ML
INJECTION SUBCUTANEOUS
Qty: 2 ML | Refills: 5 | Status: SHIPPED | OUTPATIENT
Start: 2018-05-10 | End: 2018-05-17

## 2018-05-14 ENCOUNTER — TELEPHONE (OUTPATIENT)
Dept: FAMILY MEDICINE | Facility: CLINIC | Age: 68
End: 2018-05-14

## 2018-05-14 NOTE — TELEPHONE ENCOUNTER
EPINEPHrine (EPIPEN 2-LISA) 0.3 MG/0.3ML injection 2-pack 2 mL 5 5/10/2018  No      SiINJECT 0.3 MLS INTO THE MUSCLE ONCE AS NEEDED FOR ANAPHYLAXIS     Class: E-Prescribe     Order: [id number]     E-Prescribing Status: Receipt confirmed by pharmacy (5/10/2018 11:22 AM CDT)     The pharmacy sent a fax to clarify the quantity ordered. Did the PCP mean a 2 pack and not 2 mL?

## 2018-05-15 NOTE — TELEPHONE ENCOUNTER
Called [facility] pharmacy back and let them know it was 2 pack instead of 2 mL.

## 2018-05-16 ENCOUNTER — TELEPHONE (OUTPATIENT)
Dept: FAMILY MEDICINE | Facility: CLINIC | Age: 68
End: 2018-05-16

## 2018-05-16 DIAGNOSIS — Z91.030 BEE STING ALLERGY: ICD-10-CM

## 2018-05-16 NOTE — TELEPHONE ENCOUNTER
Pt called back.  She now would like to  the hardcopy at the clinic and  Take it to another pharmacy.  She was able to print a coupon though [employer].  Please call when ready.

## 2018-05-16 NOTE — TELEPHONE ENCOUNTER
Reason for Call:  Other call back    Detailed comments: Patient calling in regards to the Epi-pen. Patient would like Dr Wiseman to send a prescription for the generic as it is cheaper. Please call    Phone Number Patient can be reached at: Home number on file [phone] (home)    Best Time: any    Can we leave a detailed message on this number? YES    Call taken on 5/16/2018 at 1:22 PM by MICHAEL REYES

## 2018-05-16 NOTE — TELEPHONE ENCOUNTER
Pt asked that epinephrine order be sent to [facility] pharmacy . Mail order pharmacy, said cost for epi pen is $479. Pt preferred generic. Ok to wait for PCP approval tomorrow.

## 2018-05-17 RX ORDER — EPINEPHRINE 0.3 MG/.3ML
INJECTION SUBCUTANEOUS
Qty: 2 ML | Refills: 5 | Status: SHIPPED | OUTPATIENT
Start: 2018-05-17 | End: 2020-04-29

## 2018-05-21 ENCOUNTER — TELEPHONE (OUTPATIENT)
Dept: FAMILY MEDICINE | Facility: CLINIC | Age: 68
End: 2018-05-21

## 2018-05-21 NOTE — TELEPHONE ENCOUNTER
Reason for Call:  Other prescription    Detailed comments: Miley from [facility] called .  Would like generic for the Epi Pen    Phone Number Patient can be reached at: Other phone number:  [phone]    Best Time: soon    Can we leave a detailed message on this number? YES    Call taken on 5/21/2018 at 11:48 AM by YOANNA HILL

## 2018-05-21 NOTE — TELEPHONE ENCOUNTER
MELISSA is not indicated on the prescription. The medication is substitutable.     Generic Adrenaclick is what they have they are calling to ask if it is okay to fill it.     Routing to provider team for okay.

## 2018-05-23 NOTE — TELEPHONE ENCOUNTER
Called pharmacy to give verbal approval for script. Patient choose to take script elsewhere to have filled.

## 2018-05-29 ENCOUNTER — OFFICE VISIT (OUTPATIENT)
Dept: FAMILY MEDICINE | Facility: CLINIC | Age: 68
End: 2018-05-29
Payer: COMMERCIAL

## 2018-05-29 VITALS
RESPIRATION RATE: 16 BRPM | OXYGEN SATURATION: 100 % | BODY MASS INDEX: 19.75 KG/M2 | SYSTOLIC BLOOD PRESSURE: 120 MMHG | HEART RATE: 74 BPM | WEIGHT: 108 LBS | DIASTOLIC BLOOD PRESSURE: 70 MMHG

## 2018-05-29 DIAGNOSIS — E78.00 HYPERCHOLESTEROLEMIA: ICD-10-CM

## 2018-05-29 DIAGNOSIS — R73.02 GLUCOSE INTOLERANCE (IMPAIRED GLUCOSE TOLERANCE): Chronic | ICD-10-CM

## 2018-05-29 DIAGNOSIS — R22.9 CALCIFIED NODULE: Primary | ICD-10-CM

## 2018-05-29 DIAGNOSIS — E03.9 ACQUIRED HYPOTHYROIDISM: ICD-10-CM

## 2018-05-29 DIAGNOSIS — E44.1 MILD PROTEIN-CALORIE MALNUTRITION (H): ICD-10-CM

## 2018-05-29 DIAGNOSIS — F32.5 MAJOR DEPRESSION IN COMPLETE REMISSION (H): ICD-10-CM

## 2018-05-29 PROCEDURE — 99214 OFFICE O/P EST MOD 30 MIN: CPT | Performed by: FAMILY MEDICINE

## 2018-05-29 PROCEDURE — 84443 ASSAY THYROID STIM HORMONE: CPT | Performed by: FAMILY MEDICINE

## 2018-05-29 PROCEDURE — 82947 ASSAY GLUCOSE BLOOD QUANT: CPT | Performed by: FAMILY MEDICINE

## 2018-05-29 PROCEDURE — 84460 ALANINE AMINO (ALT) (SGPT): CPT | Performed by: FAMILY MEDICINE

## 2018-05-29 PROCEDURE — 80061 LIPID PANEL: CPT | Performed by: FAMILY MEDICINE

## 2018-05-29 PROCEDURE — 36415 COLL VENOUS BLD VENIPUNCTURE: CPT | Performed by: FAMILY MEDICINE

## 2018-05-29 ASSESSMENT — ANXIETY QUESTIONNAIRES
GAD7 TOTAL SCORE: 2
6. BECOMING EASILY ANNOYED OR IRRITABLE: NOT AT ALL
2. NOT BEING ABLE TO STOP OR CONTROL WORRYING: NOT AT ALL
3. WORRYING TOO MUCH ABOUT DIFFERENT THINGS: SEVERAL DAYS
7. FEELING AFRAID AS IF SOMETHING AWFUL MIGHT HAPPEN: NOT AT ALL
GAD7 TOTAL SCORE: 2
7. FEELING AFRAID AS IF SOMETHING AWFUL MIGHT HAPPEN: NOT AT ALL
1. FEELING NERVOUS, ANXIOUS, OR ON EDGE: SEVERAL DAYS
5. BEING SO RESTLESS THAT IT IS HARD TO SIT STILL: NOT AT ALL
4. TROUBLE RELAXING: NOT AT ALL
GAD7 TOTAL SCORE: 2

## 2018-05-29 ASSESSMENT — PATIENT HEALTH QUESTIONNAIRE - PHQ9
10. IF YOU CHECKED OFF ANY PROBLEMS, HOW DIFFICULT HAVE THESE PROBLEMS MADE IT FOR YOU TO DO YOUR WORK, TAKE CARE OF THINGS AT HOME, OR GET ALONG WITH OTHER PEOPLE: NOT DIFFICULT AT ALL
SUM OF ALL RESPONSES TO PHQ QUESTIONS 1-9: 0
SUM OF ALL RESPONSES TO PHQ QUESTIONS 1-9: 0

## 2018-05-29 NOTE — LETTER
June 2, 2018      Alma Moore  [address] [address]        Dear ,    We are writing to inform you of your test results.    They are all normal     THE FOLLOWING ARE EXPLANATIONS OF SOME OF OUR LAB TESTS     YOU DID NOT NECESSARILY HAVE ALL OF THESE DONE     Hgb is the blood iron level   WBC means White Blood Cells   Platelets are small blood cells that help with forming the blood clots along with other blood factors.   Electrolytes are Sodium, Potassium, Calcium, Magnesium, Phosphorus.   Liver tests are: AST, ALT, Bilirubin, Alkaline Phosphatase.   Kidney tests are Creatinine, GFR.   HDL Cholesterol - is the good cholesterol and it is good to have it high.   LDL cholesterol is the bad cholesterol and it is good to have it low.   It is recommended to have LDL less than 130 for people with hypertension and to have it less than 100 for people with heart disease, diabetes and chronic kidney disease.   Triglycerides are another type of lipid that can cause heart disease, like the cholesterol and should be kept low   Thyroid tests are TSH, T4, T3   Glucose is sugar.   A1c is a test that gives us an idea about how well was controlled the diabetes for the last 3 months.   PSA stands for Prostate Specific Antigen and it can be elevated with prostate cancer or prostate inflammation.     Please continue on the same medications    Resulted Orders   TSH WITH FREE T4 REFLEX   Result Value Ref Range    TSH 0.58 0.40 - 4.00 mU/L   Glucose   Result Value Ref Range    Glucose 80 70 - 99 mg/dL   Lipid panel reflex to direct LDL Fasting   Result Value Ref Range    Cholesterol 180 <200 mg/dL    Triglycerides 52 <150 mg/dL    HDL Cholesterol 94 >49 mg/dL    LDL Cholesterol Calculated 76 <100 mg/dL      Comment:      Desirable:       <100 mg/dl    Non HDL Cholesterol 86 <130 mg/dL   ALT   Result Value Ref Range    ALT 27 0 - 50 U/L       If you have any questions or concerns, please call the  clinic at the number listed above.       Sincerely,        Vale Wiseman MD

## 2018-05-29 NOTE — LETTER
My Depression Action Plan  Name: Alma Moore   Date of Birth 1950  Date: 5/29/2018    My doctor: Vale Wiseman   My clinic: [address] [address]  [phone]          GREEN    ZONE   Good Control    What it looks like:     Things are going generally well. You have normal up s and down s. You may even feel depressed from time to time, but bad moods usually last less than a day.   What you need to do:  1. Continue to care for yourself (see self care plan)  2. Check your depression survival kit and update it as needed  3. Follow your physician s recommendations including any medication.  4. Do not stop taking medication unless you consult with your physician first.           YELLOW         ZONE Getting Worse    What it looks like:     Depression is starting to interfere with your life.     It may be hard to get out of bed; you may be starting to isolate yourself from others.    Symptoms of depression are starting to last most all day and this has happened for several days.     You may have suicidal thoughts but they are not constant.   What you need to do:     1. Call your care team, your response to treatment will improve if you keep your care team informed of your progress. Yellow periods are signs an adjustment may need to be made.     2. Continue your self-care, even if you have to fake it!    3. Talk to someone in your support network    4. Open up your depression survival kit           RED    ZONE Medical Alert - Get Help    What it looks like:     Depression is seriously interfering with your life.     You may experience these or other symptoms: You can t get out of bed most days, can t work or engage in other necessary activities, you have trouble taking care of basic hygiene, or basic responsibilities, thoughts of suicide or death that will not go away, self-injurious behavior.     What you  need to do:  1. Call your care team and request a same-day appointment. If they are not available (weekends or after hours) call your local crisis line, emergency room or 911.            Depression Self Care Plan / Survival Kit    Self-Care for Depression  Here s the deal. Your body and mind are really not as separate as most people think.  What you do and think affects how you feel and how you feel influences what you do and think. This means if you do things that people who feel good do, it will help you feel better.  Sometimes this is all it takes.  There is also a place for medication and therapy depending on how severe your depression is, so be sure to consult with your medical provider and/ or Behavioral Health Consultant if your symptoms are worsening or not improving.     In order to better manage my stress, I will:    Exercise  Get some form of exercise, every day. This will help reduce pain and release endorphins, the  feel good  chemicals in your brain. This is almost as good as taking antidepressants!  This is not the same as joining a gym and then never going! (they count on that by the way ) It can be as simple as just going for a walk or doing some gardening, anything that will get you moving.      Hygiene   Maintain good hygiene (Get out of bed in the morning, Make your bed, Brush your teeth, Take a shower, and Get dressed like you were going to work, even if you are unemployed).  If your clothes don't fit try to get ones that do.    Diet  I will strive to eat foods that are good for me, drink plenty of water, and avoid excessive sugar, caffeine, alcohol, and other mood-altering substances.  Some foods that are helpful in depression are: complex carbohydrates, B vitamins, flaxseed, fish or fish oil, fresh fruits and vegetables.    Psychotherapy  I agree to participate in Individual Therapy (if recommended).    Medication  If prescribed medications, I agree to take them.  Missing doses can result in  serious side effects.  I understand that drinking alcohol, or other illicit drug use, may cause potential side effects.  I will not stop my medication abruptly without first discussing it with my provider.    Staying Connected With Others  I will stay in touch with my friends, family members, and my primary care provider/team.    Use your imagination  Be creative.  We all have a creative side; it doesn t matter if it s oil painting, sand castles, or mud pies! This will also kick up the endorphins.    Witness Beauty  (AKA stop and smell the roses) Take a look outside, even in mid-winter. Notice colors, textures. Watch the squirrels and birds.     Service to others  Be of service to others.  There is always someone else in need.  By helping others we can  get out of ourselves  and remember the really important things.  This also provides opportunities for practicing all the other parts of the program.    Humor  Laugh and be silly!  Adjust your TV habits for less news and crime-drama and more comedy.    Control your stress  Try breathing deep, massage therapy, biofeedback, and meditation. Find time to relax each day.     My support system    Clinic Contact:  Phone number:    Contact 1:  Phone number:    Contact 2:  Phone number:    Restoration/:  Phone number:    Therapist:  Phone number:    Local crisis center:    Phone number:    Other community support:  Phone number:

## 2018-05-29 NOTE — MR AVS SNAPSHOT
After Visit Summary   5/29/2018    Alma Moore    MRN: [id number]           Patient Information     Date Of Birth          1950        Visit Information        Provider Department      5/29/2018 11:20 AM Vale Wiseman MD [facility]        Today's Diagnoses     Glucose intolerance (impaired glucose tolerance): Hgb A1C= 5.7 in 2014    -  1    Hypercholesterolemia        Acquired hypothyroidism        Mild protein-calorie malnutrition (H)        Major depression in complete remission (H) on no meds           Care Instructions    1. Shingrex is a 2 shot series that prevents shingles 97% of the time, as opposed to the old shingles shot that only prevented it at 40-50%  It costs less for medicare at a pharmacy  You should get it starting at 50 yrs old               Follow-ups after your visit        Follow-up notes from your care team     Return in about 6 months (around 11/29/2018) for Routine Visit.      Your next 10 appointments already scheduled     Jun 19, 2018  1:45 PM CDT   MA SCREENING DIGITAL BILATERAL with BXMA1   [facility]    [address] [place] [address], [address] [address] [phone]           Do not use any powder, lotion or deodorant under your arms or on your breast. If you do, we will ask you to remove it before your exam.  Wear comfortable, two-piece clothing.  If you have any allergies, tell your care team.  Bring any previous mammograms from other facilities or have them mailed to the breast center.              Who to contact     If you have questions or need follow up information about today's clinic visit or your schedule please contact [facility] directly at [phone].  Normal or non-critical lab and imaging results will be communicated to you by MyChart, letter or phone within 4 business days after the  "clinic has received the results. If you do not hear from us within 7 days, please contact the clinic through [employer]ictor[employer] or phone. If you have a critical or abnormal lab result, we will notify you by phone as soon as possible.  Submit refill requests through [employer]ictorious or call your pharmacy and they will forward the refill request to us. Please allow 3 business days for your refill to be completed.          Additional Information About Your Visit        [employer] Information     Pictorious lets you send messages to your doctor, view your test results, renew your prescriptions, schedule appointments and more. To sign up, go to [email or web address] . Click on \"Log in\" on the left side of the screen, which will take you to the Welcome page. Then click on \"Sign up Now\" on the right side of the page.     You will be asked to enter the access code listed below, as well as some personal information. Please follow the directions to create your username and password.     Your access code is: [email or web address]  Expires: 2018 11:08 AM     Your access code will  in 90 days. If you need help or a new code, please call your [facility] clinic or [phone].        Care EveryWhere ID     This is your Care EveryWhere ID. This could be used by other organizations to access your [facility] medical records  [id number]        Your Vitals Were     Pulse Respirations Last Period Pulse Oximetry BMI (Body Mass Index)       74 16 (LMP Unknown) 100% 19.75 kg/m2        Blood Pressure from Last 3 Encounters:   18 120/70   02/15/18 112/80   17 112/74    Weight from Last 3 Encounters:   18 108 lb (49 kg)   02/15/18 108 lb (49 kg)   17 109 lb (49.4 kg)              We Performed the Following     ALT     DEPRESSION ACTION PLAN (DAP)     Glucose     Lipid panel reflex to direct LDL Fasting     TSH WITH FREE T4 REFLEX        Primary Care Provider Office Phone # Fax #    Vale Wiseman [phone]    "    [address] [facility] [address]        Equal Access to Services     YUE CARROLL : Hadii aad ku hadlolyo Soconcepcionali, waaxda luqadaha, qaybta kaalmada nayandivinasherry, wilfredo klinejenniferleilani kern. So [facility] [phone].    ATENCIÓN: Si habla español, tiene a ceballos disposición servicios gratuitos de asistencia lingüística. Llame al [phone].    We comply with applicable federal civil rights laws and [place] laws. We do not discriminate on the basis of race, color, national origin, age, disability, sex, sexual orientation, or gender identity.            Thank you!     Thank you for choosing [facility]  for your care. Our goal is always to provide you with excellent care. Hearing back from our patients is one way we can continue to improve our services. Please take a few minutes to complete the written survey that you may receive in the mail after your visit with us. Thank you!             Your Updated Medication List - Protect others around you: Learn how to safely use, store and throw away your medicines at www.disposemymeds.org.          This list is accurate as of 5/29/18  2:14 PM.  Always use your most recent med list.                   Brand Name Dispense Instructions for use Diagnosis    aspirin 81 MG tablet     90 tablet    Take 1 tablet (81 mg) by mouth daily    Routine general medical examination at a health care facility       atorvastatin 40 MG tablet    LIPITOR    90 tablet    TAKE 1 TABLET EVERY DAY    Hypercholesterolemia       ciprofloxacin 500 MG tablet    CIPRO    6 tablet    Take 1 tablet (500 mg) by mouth 2 times daily    At risk for infectious disease due to recent foreign travel       EPINEPHrine 0.3 MG/0.3ML injection 2-pack    EPIPEN 2-LISA    2 mL    Issue a 2 pen packet   INJECT 0.3 MLS INTO THE MUSCLE ONCE AS NEEDED FOR ANAPHYLAXIS    Bee sting allergy       levothyroxine 75 MCG tablet    SYNTHROID/LEVOTHROID    90 tablet    TAKE 1 TABLET EVERY  DAY    Acquired hypothyroidism       OMEGA-3 FISH OIL PO      Take 1 g by mouth 2 times daily (with meals).        typhoid CR capsule    VIVOTIF    4 capsule    Take 1 capsule by mouth every other day With the last dose at least 7 days prior to exposure    Travel advice encounter       VITAMIN D (CHOLECALCIFEROL) PO      Take 1,000 Units by mouth daily.

## 2018-05-29 NOTE — PATIENT INSTRUCTIONS
1. Shingrex is a 2 shot series that prevents shingles 97% of the time, as opposed to the old shingles shot that only prevented it at 40-50%  It costs less for medicare at a pharmacy  You should get it starting at 50 yrs old

## 2018-05-29 NOTE — PROGRESS NOTES
SUBJECTIVE:   Alma Moore is a 68 year old female who presents to clinic today for the following health issues:      Mass- Calcified Hematoma Rt dist dors forearm       Duration: about 2 weeks    Description (location/character/radiation): pt bumped right forearm and it was bruised.  Still has a lump    Intensity:  moderate    Accompanying signs and symptoms: none    History (similar episodes/previous evaluation): None    Precipitating or alleviating factors: None    Therapies tried and outcome: None     PROTEIN-CALORIE MALNUTRITION    -BMI  Low   - stable   -active pt       Glucose Intolerance      Patient is checking blood sugars: not at all    Diabetic concerns: None     Symptoms of hypoglycemia (low blood sugar): none     Paresthesias (numbness or burning in feet) or sores: No     Date of last diabetic eye exam: 2017    BP Readings from Last 2 Encounters:   05/29/18 120/70   02/15/18 112/80     Hemoglobin A1C (%)   Date Value   06/22/2017 5.4   04/08/2016 5.5     LDL Cholesterol Calculated (mg/dL)   Date Value   12/14/2017 59   06/22/2017 81     Hyperlipidemia:LDL Follow-Up      Rate your low fat/cholesterol diet?: good    Taking statin?  Yes, no muscle aches from 40mgm atorvastatin    Other lipid medications/supplements?:  none    Depression and Anxiety Follow-Up    Status since last visit: No change-in remission     Other associated symptoms:None    Complicating factors:     Significant life event: No     Current substance abuse: None    PHQ-9 6/30/2016 12/14/2017 5/29/2018   Total Score 1 2 0   Q9: Suicide Ideation Not at all Not at all Not at all     NOAH-7 SCORE 12/14/2017 5/29/2018   Total Score - 2 (minimal anxiety)   Total Score 1 2       PHQ-9  English  PHQ-9   Any Language  NOAH-7  Suicide Assessment Five-step Evaluation and Treatment (SAFE-T)      Hypothyroidism Follow-up      Since last visit, patient describes the following symptoms: Weight stable, no hair loss, no skin changes, no  constipation, no loose stools    Levothyroxine 75 mgm     TSH= OK 6-17      Problem list and histories reviewed & adjusted, as indicated.  Additional history: as documented    Labs reviewed in EPIC    Reviewed and updated as needed this visit by clinical staff  Tobacco  Allergies  Meds  Problems  Med Hx  Surg Hx  Fam Hx  Soc Hx        Reviewed and updated as needed this visit by Provider  Allergies  Meds  Problems         ROS:  CONSTITUTIONAL: NEGATIVE for fever, chills, change in weight  INTEGUMENTARY/SKIN: NEGATIVE for worrisome rashes, moles or lesions POS mass on arm   EYES: NEGATIVE for vision changes or irritation  ENT/MOUTH: NEGATIVE for ear, mouth and throat problems  RESP: NEGATIVE for significant cough or SOB  BREAST: NEGATIVE for masses, tenderness or discharge  CV: NEGATIVE for chest pain, palpitations or peripheral edema  GI: NEGATIVE for nausea, abdominal pain, heartburn, or change in bowel habits  : NEGATIVE for frequency, dysuria, or hematuria  MUSCULOSKELETAL: NEGATIVE for significant arthralgias or myalgia  NEURO: NEGATIVE for weakness, dizziness or paresthesias  ENDOCRINE: NEGATIVE for temperature intolerance, skin/hair changes  HEME: NEGATIVE for bleeding problems  PSYCHIATRIC: NEGATIVE for changes in mood or affect    OBJECTIVE:     /70  Pulse 74  Resp 16  Wt 108 lb (49 kg)  LMP  (LMP Unknown)  SpO2 100%  BMI 19.75 kg/m2  Body mass index is 19.75 kg/(m^2).  GENERAL: healthy, alert and no distress  EYES: Eyes grossly normal to inspection, PERRL and conjunctivae and sclerae normal  RESP: lungs clear to auscultation - no rales, rhonchi or wheezes  MS: no gross musculoskeletal defects noted, no edema  SKIN: no suspicious lesions or rashes  POS firm nontender -attached to bone 1.5 cm diam dist dorsal Rt forearm  NEURO: Normal strength and tone, mentation intact and speech normal  PSYCH: mentation appears normal, affect normal/bright, anxious and appearance well  groomed    Diagnostic Test Results:  Results for orders placed or performed in visit on 12/14/17   ALT   Result Value Ref Range    ALT 27 0 - 50 U/L   AST   Result Value Ref Range    AST 32 0 - 45 U/L   Lipid panel reflex to direct LDL Fasting   Result Value Ref Range    Cholesterol 190 <200 mg/dL    Triglycerides 57 <150 mg/dL    HDL Cholesterol 120 >49 mg/dL    LDL Cholesterol Calculated 59 <100 mg/dL    Non HDL Cholesterol 70 <130 mg/dL       ASSESSMENT/PLAN:               ICD-10-CM    1. Calcified nodule from prior trauma Rt dorsum forearm distally  R22.9    2. Glucose intolerance (impaired glucose tolerance): Hgb A1C= 5.7 in 2014 R73.02 Glucose   3. Hypercholesterolemia E78.00 Lipid panel reflex to direct LDL Fasting     ALT   4. Major depression in complete remission (H) on no meds  F32.5 DEPRESSION ACTION PLAN (DAP)   5. Acquired hypothyroidism E03.9 TSH WITH FREE T4 REFLEX   6. Mild protein-calorie malnutrition (H) E44.1        Patient Instructions   1. Shingrex is a 2 shot series that prevents shingles 97% of the time, as opposed to the old shingles shot that only prevented it at 40-50%  It costs less for medicare at a pharmacy  You should get it starting at 50 yrs old       reassured that this is calcified blood from prior trauma     Vale Wiseman MD  [facility]    Weight management plan noted, stable and monitoring    Vale Wiseman MD

## 2018-05-30 LAB
ALT SERPL W P-5'-P-CCNC: 27 U/L (ref 0–50)
CHOLEST SERPL-MCNC: 180 MG/DL
GLUCOSE SERPL-MCNC: 80 MG/DL (ref 70–99)
HDLC SERPL-MCNC: 94 MG/DL
LDLC SERPL CALC-MCNC: 76 MG/DL
NONHDLC SERPL-MCNC: 86 MG/DL
TRIGL SERPL-MCNC: 52 MG/DL
TSH SERPL DL<=0.005 MIU/L-ACNC: 0.58 MU/L (ref 0.4–4)

## 2018-05-30 ASSESSMENT — PATIENT HEALTH QUESTIONNAIRE - PHQ9: SUM OF ALL RESPONSES TO PHQ QUESTIONS 1-9: 0

## 2018-05-30 ASSESSMENT — ANXIETY QUESTIONNAIRES: GAD7 TOTAL SCORE: 2

## 2018-06-04 ENCOUNTER — TELEPHONE (OUTPATIENT)
Dept: FAMILY MEDICINE | Facility: CLINIC | Age: 68
End: 2018-06-04

## 2018-06-04 NOTE — TELEPHONE ENCOUNTER
Date of receipt at location: 6/4/18  [facility] or [facility]? [facility]  Type of form: health care directive  Checked over by facilitator? Yes  All pages present? Yes  Any obvious gaps in documentation? No  Date sent to [facility]: 6/4/18  Who brought the form in ? Patient

## 2018-06-19 ENCOUNTER — RADIANT APPOINTMENT (OUTPATIENT)
Dept: MAMMOGRAPHY | Facility: CLINIC | Age: 68
End: 2018-06-19
Payer: COMMERCIAL

## 2018-06-19 DIAGNOSIS — Z12.31 VISIT FOR SCREENING MAMMOGRAM: ICD-10-CM

## 2018-06-19 PROCEDURE — 77067 SCR MAMMO BI INCL CAD: CPT | Mod: TC

## 2018-06-25 ENCOUNTER — HOSPITAL ENCOUNTER (OUTPATIENT)
Dept: MAMMOGRAPHY | Facility: CLINIC | Age: 68
Discharge: HOME | End: 2018-06-25
Attending: FAMILY MEDICINE | Admitting: FAMILY MEDICINE
Payer: MEDICARE

## 2018-06-25 DIAGNOSIS — R92.8 ABNORMAL MAMMOGRAM: ICD-10-CM

## 2018-06-25 PROCEDURE — 77065 DX MAMMO INCL CAD UNI: CPT

## 2018-07-11 DIAGNOSIS — E03.9 ACQUIRED HYPOTHYROIDISM: ICD-10-CM

## 2018-07-12 RX ORDER — LEVOTHYROXINE SODIUM 75 UG/1
TABLET ORAL
Qty: 90 TABLET | Refills: 3 | Status: SHIPPED | OUTPATIENT
Start: 2018-07-12 | End: 2018-12-18

## 2018-07-12 NOTE — TELEPHONE ENCOUNTER
"Requested Prescriptions   Pending Prescriptions Disp Refills     levothyroxine (SYNTHROID/LEVOTHROID) 75 MCG tablet [Pharmacy Med Name: LEVOTHYROXINE SODIUM 75 MCG Tablet]  Last Written Prescription Date:  5-10-18  Last Fill Quantity: 90tab,  # refills: 0   Last office visit: 5/29/2018 with prescribing provider:     Future Office Visit:     90 tablet 0     Sig: TAKE 1 TABLET EVERY DAY    Thyroid Protocol Passed    7/11/2018  9:22 PM       Passed - Patient is 12 years or older       Passed - Recent (12 mo) or future (30 days) visit within the authorizing provider's specialty    Patient had office visit in the last 12 months or has a visit in the next 30 days with authorizing provider or within the authorizing provider's specialty.  See \"Patient Info\" tab in inbasket, or \"Choose Columns\" in Meds & Orders section of the refill encounter.           Passed - Normal TSH on file in past 12 months    Recent Labs   Lab Test  05/29/18   1136   TSH  0.58             Passed - No active pregnancy on record    If patient is pregnant or has had a positive pregnancy test, please check TSH.         Passed - No positive pregnancy test in past 12 months    If patient is pregnant or has had a positive pregnancy test, please check TSH.            "

## 2018-07-26 ENCOUNTER — TELEPHONE (OUTPATIENT)
Dept: FAMILY MEDICINE | Facility: CLINIC | Age: 68
End: 2018-07-26

## 2018-07-26 NOTE — TELEPHONE ENCOUNTER
Reason for Call:  Form, our goal is to have forms completed with 72 hours, however, some forms may require a visit or additional information.    Type of letter, form or note:  volunteering    Who is the form from?: For: patient to volunteer at [facility].     Where did the form come from: Patient or family brought in       What clinic location was the form placed at?: [facility]    Where the form was placed: given to Kiya Baker     What number is listed as a contact on the form?: Alma: [phone]       Additional comments: Please mail the completed forms for Alma and mail to the address in her chart.    Call taken on 7/26/2018 at 10:51 AM by Pamela Soliman

## 2018-09-11 PROBLEM — M25.512 SHOULDER PAIN, LEFT: Status: RESOLVED | Noted: 2018-01-02 | Resolved: 2018-09-11

## 2018-09-11 PROBLEM — M25.522 LEFT ELBOW PAIN: Status: RESOLVED | Noted: 2018-01-02 | Resolved: 2018-09-11

## 2018-09-11 NOTE — PROGRESS NOTES
Discharge Note    Progress reporting period is from initial eval to Feb 7, 2018.     Alma failed to return for next follow up visit and current status is unknown.  Please see information below for last relevant information on current status.  Patient seen for 3 visits.  SUBJECTIVE  Subjective changes noted by patient:  Patient having more elbow pain than shoulder.  Worse with lifting more than 3 lbs.   .  Current pain level is  .     Previous pain level was  5/10.   Changes in function:  Yes (See Goal flowsheet attached for changes in current functional level)  Adverse reaction to treatment or activity: None    OBJECTIVE  Changes noted in objective findings: Left wrist extension 4+/5, rad dev 4+/5.       ASSESSMENT/PLAN  Diagnosis: Left shoulder and elbow   DIAGP:  Diagnoses of Shoulder pain, left and Left elbow pain were pertinent to this visit.  Updated problem list and treatment plan:   Pain - HEP  Decreased ROM/flexibility - HEP  Decreased function - HEP  Decreased strength - HEP  STG/LTGs have been met or progress has been made towards goals:  Yes, please see goal flowsheet for most current information  Assessment of Progress: current status is unknown.    Last current status: Pt is progressing slower than anticipated   Self Management Plans:  HEP  I have re-evaluated this patient and find that the nature, scope, duration and intensity of the therapy is appropriate for the medical condition of the patient.  Alma continues to require the following intervention to meet STG and LTG's:  HEP.    Recommendations:  Discharge with current home program.  Patient to follow up with MD as needed.    Please refer to the daily flowsheet for treatment today, total treatment time and time spent performing 1:1 timed codes.

## 2018-09-17 ENCOUNTER — TELEPHONE (OUTPATIENT)
Dept: FAMILY MEDICINE | Facility: CLINIC | Age: 68
End: 2018-09-17

## 2018-09-17 NOTE — TELEPHONE ENCOUNTER
Reason for Call:  Other call back    Detailed comments: She is asking 2 general questions of Dr. ROHIT Wiseman: Is a daily baby Asprin is a good idea? She recently heard on the news daily baby Asprin may do more harm than good. Second question is, Dr. Wiseman's opinion of a vegetarian diet.    Phone Number Patient can be reached at: Cell number on file:    Telephone Information:   Mobile [phone]       Best Time: any    Can we leave a detailed message on this number? YES    Call taken on 9/17/2018 at 9:04 AM by Pamela Soliman

## 2018-09-17 NOTE — TELEPHONE ENCOUNTER
The patient was called back, declined offer to schedule at this time. States she will call back and schedule an only or phone visit later on.

## 2018-09-24 DIAGNOSIS — E78.00 HYPERCHOLESTEROLEMIA: ICD-10-CM

## 2018-09-25 RX ORDER — ATORVASTATIN CALCIUM 40 MG/1
TABLET, FILM COATED ORAL
Qty: 90 TABLET | Refills: 1 | Status: SHIPPED | OUTPATIENT
Start: 2018-09-25 | End: 2018-12-18

## 2018-09-25 NOTE — TELEPHONE ENCOUNTER
"Requested Prescriptions   Pending Prescriptions Disp Refills     atorvastatin (LIPITOR) 40 MG tablet [Pharmacy Med Name: ATORVASTATIN CALCIUM 40 MG Tablet] 90 tablet 2    Last Written Prescription Date:  3/5/18  Last Fill Quantity: 90,  # refills: 2   Last office visit: 5/29/2018 with prescribing provider:     Future Office Visit:   Next 5 appointments (look out 90 days)     Sep 28, 2018  9:40 AM CDT   [facility]phone Visit with [facility] MD   [facility]    [address] [place] [id number]   [phone]                  Sig: TAKE 1 TABLET EVERY DAY    Statins Protocol Passed    9/24/2018  9:46 PM       Passed - LDL on file in past 12 months    Recent Labs   Lab Test  05/29/18   1136   LDL  76            Passed - No abnormal creatine kinase in past 12 months    No lab results found.            Passed - Recent (12 mo) or future (30 days) visit within the authorizing provider's specialty    Patient had office visit in the last 12 months or has a visit in the next 30 days with authorizing provider or within the authorizing provider's specialty.  See \"Patient Info\" tab in inbasket, or \"Choose Columns\" in Meds & Orders section of the refill encounter.           Passed - Patient is age 18 or older       Passed - No active pregnancy on record       Passed - No positive pregnancy test in past 12 months          "

## 2018-09-28 ENCOUNTER — VIRTUAL VISIT (OUTPATIENT)
Dept: FAMILY MEDICINE | Facility: CLINIC | Age: 68
End: 2018-09-28
Payer: COMMERCIAL

## 2018-09-28 DIAGNOSIS — M54.31 SCIATICA OF RIGHT SIDE: ICD-10-CM

## 2018-09-28 DIAGNOSIS — R60.0 LOCALIZED EDEMA: ICD-10-CM

## 2018-09-28 DIAGNOSIS — Z78.9 VEGETARIAN: ICD-10-CM

## 2018-09-28 DIAGNOSIS — R20.9 DISTURBANCE OF SKIN SENSATION: Primary | ICD-10-CM

## 2018-09-28 PROCEDURE — 99213 OFFICE O/P EST LOW 20 MIN: CPT | Performed by: FAMILY MEDICINE

## 2018-09-28 NOTE — PROGRESS NOTES
"Alma Moore is a 68 year old female who is being evaluated via a telephone visit.      The patient has been notified of following (by OBDULIO Cannon CMA       \"We have found that certain health care needs can be provided without the need for a physical exam.  This service lets us provide the care you need with a short phone conversation.  If a prescription is necessary we can send it directly to your pharmacy.  If lab work is needed we can place an order for that and you can then stop by our lab to have the test done at a later time.    This telephone visit will be conducted via 3 way call with the you (the patient) , the physician/provider, and a me all on the line at the same time.  This allows your physician/provider to have the phone conversation with you while I will be taking notes for your medical record.  We will have full access to your [facility] medical record during this entire phone call.    Since this is like an office visit,  will bill your insurance company for this service.  Please check with your medical insurance if this type of telephone/virtual is covered . You may be responsible for the cost of this service if insurance coverage is denied.  The typical cost is $30 (10min), $59(11-20min) and $85 (21-30min)     If during the course of the call the physician/provider feels a telephone visit is not appropriate, you will not be charged for this service\"    Consent has been obtained for this service by care team member: yes.  See the scanned image in the medical record.    S: The history as provided by the patient to the provider during this 3 way call include     Pertinent parts of the the patient's medical history reviewed and confirmed by the provider included : review of entire chart and labs      Total time of call between patient and provider was 16 minutes     Vale Wiseman MD   (MD signature)  ===================================================    I have reviewed the note as " documented above.  This accurately captures the substance of my conversation with the patient,    Additional provider notes:    Assessment/Plan:  No diagnosis found.

## 2018-09-28 NOTE — PROGRESS NOTES
SUBJECTIVE:   Alma Moore is a 68 year old female who presents to clinic today for the following health issues:    PROTEIN-CALORIE MALNUTRITION      -BMI  Low     - stable     -active pt     VEGETARIAN DIET     -+ FISH   -HAS been doing a little and is to shift entirely   -already slim -- see above   Hgb= wnl   -could help lower her cholesterol and may be able to stop the statin           BP Readings from Last 2 Encounters:   05/29/18 120/70   02/15/18 112/80     Hemoglobin A1C (%)   Date Value   06/22/2017 5.4   04/08/2016 5.5     LDL Cholesterol Calculated (mg/dL)   Date Value   12/14/2017 59   06/22/2017 81           Amount of exercise or physical activity: None    Problems taking medications regularly: No    Medication side effects: none    Diet: regular (no restrictions)    Musculoskeletal problem/pain: Rt Leg BK Dysesthesias       Duration: 3 mo     Description  Location: avbove    Intensity:  mild    Accompanying signs and symptoms: none    History  Previous similar problem: no but did have sciatica in the Rt leg in 2012   Previous evaluation:  none    Precipitating or alleviating factors:  Trauma or overuse: no   Aggravating factors include: standing, walking and climbing stairs    Therapies tried and outcome: rest/inactivity    LE EDEMA       Duration: RECURRENT  For 3-4 yrs     Description (location/character/radiation): lower legs     Intensity:  moderate    Accompanying signs and symptoms: achey     History (similar episodes/previous evaluation): yes    Occurs after long plane ride s    Precipitating or alleviating factors: above    Therapies tried and outcome: wears support hose on trips     This last time in3-18 when felw to [facility], was worse than before so bought water pills and took for 5 d with  help     Di d not elevate except for sleep at nite     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  Additional history: as documented    Labs reviewed in EPIC    Reviewed and updated as needed this visit by clinical staff  Tobacco  Allergies  Meds  Problems  Med Hx  Surg Hx  Fam Hx  Soc Hx        Reviewed and updated as needed this visit by Provider  Allergies  Meds  Problems         ROS:  CONSTITUTIONAL: NEGATIVE for fever, chills, change in weight  INTEGUMENTARY/SKIN: NEGATIVE for worrisome rashes, moles or lesions  EYES: NEGATIVE for vision changes or irritation  ENT/MOUTH: NEGATIVE for ear, mouth and throat problems  RESP: NEGATIVE for significant cough or SOB  BREAST: NEGATIVE for masses, tenderness or discharge  CV: NEGATIVE for chest pain, palpitations  Yes POS  Rec.urrent  peripheral edema  GI: NEGATIVE for nausea, abdominal pain, heartburn, or change in bowel habits  : NEGATIVE for frequency, dysuria, or hematuria  MUSCULOSKELETAL: NEGATIVE for significant arthralgias or myalgia  NEURO: NEGATIVE for weakness, dizziness or paresthesias  POS tingly bRT leg BK for 3 mo   ENDOCRINE: NEGATIVE for temperature intolerance, skin/hair changes  HEME: NEGATIVE for bleeding problems  PSYCHIATRIC: NEGATIVE for changes in mood or affect        Diagnostic Test Results:  none     ASSESSMENT/PLAN:               ICD-10-CM    1. Disturbance of skin sensation--Rt leg BK since 6-18 R20.9    2. Sciatica of right side-in 2012 M54.31    3. Localized edema-ankles since 2015  R60.0    4. Vegetarian+fish  Z78.9        Patient Instructions   1. Good luck on the vegetarian (+fish) diet.  On this, people are healthier and live longer     2. Aspirin 81 mgm helps prevent strokes in women so recommend continuing it     3. Take one Fe tab 3 x a wk and we should chek your Hgb in 4-6 mo     4. Could be the 2012 Rt sciatica recurring so need to rtc for eval if it does not resolve     5. Keep the injured area above the level of the heart as much as possible to help decrease the pain and  the healing time . Put pillows  on either side while sleeping to keep the arm or leg elevated   Better than water pills as they can cause imbalance of the body systems leading to heart problems     Wear support hose on the planes  --continue to do so    6. Get the 2nd shingrex 6 mo later   Or end of 12=31=18 for insurance     7. Please send us a PHQ-9 by November    Mail to pt  As this was a phone encounter     Time spent with the patient 17mins, more than 50% in counseling and coordinating care, Re above medical problems.  Discussed all with pt  And the patient expresses understanding  And I  Explained the treatment and the reason for it & etiol               Vale Wiseman MD  [facility]

## 2018-09-28 NOTE — PATIENT INSTRUCTIONS
1. Good luck on the vegetarian (+fish) diet.  On this, people are healthier and live longer     2. Aspirin 81 mgm helps prevent strokes in women so recommend continuing it     3. Take one Fe tab 3 x a wk and we should chek your Hgb in 4-6 mo     4. Could be the 2012 Rt sciatica recurring so need to rtc for eval if it does not resolve     5. Keep the injured area above the level of the heart as much as possible to help decrease the pain and  the healing time . Put pillows on either side while sleeping to keep the arm or leg elevated   Better than water pills as they can cause imbalance of the body systems leading to heart problems     Wear support hose on the planes  --continue to do so    6. Get the 2nd shingrex 6 mo later   Or end of 12=31=18 for insurance     7. Please send us a PHQ-9 by November    Mail to pt  As this was a phone encounter

## 2018-09-28 NOTE — MR AVS SNAPSHOT
After Visit Summary   9/28/2018    Alma Moore    MRN: [id number]           Patient Information     Date Of Birth          1950        Visit Information        Provider Department      9/28/2018 9:40 AM Vale Wiseman MD [facility]        Care Instructions    1. Good luck on the vegetarian (+fish) diet.  On this, people are healthier and live longer     2. Aspirin 81 mgm helps prevent strokes in women so recommend continuing it     3. Take one Fe tab 3 x a wk and we should chek your Hgb in 4-6 mo     4. Could be the 2012 Rt sciatica recurring so need to rtc for eval if it does not resolve     5. Keep the injured area above the level of the heart as much as possible to help decrease the pain and  the healing time . Put pillows on either side while sleeping to keep the arm or leg elevated   Better than water pills as they can cause imbalance of the body systems leading to heart problems     Wear support hose on the planes  --continue to do so    6. Get the 2nd shingrex 6 mo later   Or end of 12=31=18 for insurance     7. Please send us a PHQ-9 by november          Follow-ups after your visit        Follow-up notes from your care team     Return in about 3 months (around 12/28/2018) for BP Recheck.      Who to contact     If you have questions or need follow up information about today's clinic visit or your schedule please contact [facility] directly at [phone].  Normal or non-critical lab and imaging results will be communicated to you by MyChart, letter or phone within 4 business days after the clinic has received the results. If you do not hear from us within 7 days, please contact the clinic through [employer] or phone. If you have a critical or abnormal lab result, we will notify you by phone as soon as possible.  Submit refill requests through [employer] or call your pharmacy and they will forward the refill request  to us. Please allow 3 business days for your refill to be completed.          Additional Information About Your Visit        MyChart Information     [employer] gives you secure access to your electronic health record. If you see a primary care provider, you can also send messages to your care team and make appointments. If you have questions, please call your primary care clinic.  If you do not have a primary care provider, please call [phone] and they will assist you.        Care EveryWhere ID     This is your Care EveryWhere ID. This could be used by other organizations to access your [facility] medical records  [id number]        Your Vitals Were     Last Period                   (LMP Unknown)            Blood Pressure from Last 3 Encounters:   05/29/18 120/70   02/15/18 112/80   12/28/17 112/74    Weight from Last 3 Encounters:   05/29/18 108 lb (49 kg)   02/15/18 108 lb (49 kg)   12/28/17 109 lb (49.4 kg)              Today, you had the following     No orders found for display       Primary Care Provider Office Phone # Fax #    Vale Luci Wiseman [phone]       [address] [facility] [address]        Equal Access to Services     FRANNIE CARROLL : Hadii aad ku hadasho Soomaali, waaxda luqadaha, qaybta kaalmada adeegyada, wilfredo kern. So [facility] [place] [phone].    ATENCIÓN: Si habla español, tiene a ceballos disposición servicios gratuitos de asistencia lingüística. Ll[facility] [phone].    We comply with applicable federal civil rights laws and [place] laws. We do not discriminate on the basis of race, color, national origin, age, disability, sex, sexual orientation, or gender identity.            Thank you!     Thank you for choosing [facility]  for your care. Our goal is always to provide you with excellent care. Hearing back from our patients is one way we can continue to improve our services. Please take a few minutes to  complete the written survey that you may receive in the mail after your visit with us. Thank you!             Your Updated Medication List - Protect others around you: Learn how to safely use, store and throw away your medicines at www.disposemymeds.org.          This list is accurate as of 9/28/18 12:13 PM.  Always use your most recent med list.                   Brand Name Dispense Instructions for use Diagnosis    aspirin 81 MG tablet     90 tablet    Take 1 tablet (81 mg) by mouth daily    Routine general medical examination at a health care facility       atorvastatin 40 MG tablet    LIPITOR    90 tablet    TAKE 1 TABLET EVERY DAY    Hypercholesterolemia       EPINEPHrine 0.3 MG/0.3ML injection 2-pack    EPIPEN 2-LISA    2 mL    Issue a 2 pen packet   INJECT 0.3 MLS INTO THE MUSCLE ONCE AS NEEDED FOR ANAPHYLAXIS    Bee sting allergy       levothyroxine 75 MCG tablet    SYNTHROID/LEVOTHROID    90 tablet    TAKE 1 TABLET EVERY DAY    Acquired hypothyroidism       OMEGA-3 FISH OIL PO      Take 1 g by mouth 2 times daily (with meals).        typhoid CR capsule    VIVOTIF    4 capsule    Take 1 capsule by mouth every other day With the last dose at least 7 days prior to exposure    Travel advice encounter       VITAMIN D (CHOLECALCIFEROL) PO      Take 1,000 Units by mouth daily.

## 2018-12-14 ENCOUNTER — TELEPHONE (OUTPATIENT)
Dept: FAMILY MEDICINE | Facility: CLINIC | Age: 68
End: 2018-12-14

## 2018-12-14 NOTE — TELEPHONE ENCOUNTER
Reason for Call: Request for an order or referral:    Order or referral being requested: Lab Work     Date needed: at your convenience    Has the patient been seen by the PCP for this problem? YES    Additional comments: Patient states that she was told to have some more labs done in December at her last OV.There are no labs ordered.     Phone number Patient can be reached at:  Home number on file [phone] (home)    Best Time:  Anytime     Can we leave a detailed message on this number?  YES    Call taken on 12/14/2018 at 9:29 AM by Zonia Taveras

## 2018-12-18 ENCOUNTER — OFFICE VISIT (OUTPATIENT)
Dept: FAMILY MEDICINE | Facility: CLINIC | Age: 68
End: 2018-12-18
Payer: COMMERCIAL

## 2018-12-18 VITALS
BODY MASS INDEX: 19.69 KG/M2 | DIASTOLIC BLOOD PRESSURE: 60 MMHG | SYSTOLIC BLOOD PRESSURE: 120 MMHG | WEIGHT: 107 LBS | HEIGHT: 62 IN | HEART RATE: 82 BPM | TEMPERATURE: 97.9 F | OXYGEN SATURATION: 98 % | RESPIRATION RATE: 16 BRPM

## 2018-12-18 DIAGNOSIS — C56.9 MALIGNANT NEOPLASM OF OVARY, UNSPECIFIED LATERALITY (H): ICD-10-CM

## 2018-12-18 DIAGNOSIS — E03.9 ACQUIRED HYPOTHYROIDISM: ICD-10-CM

## 2018-12-18 DIAGNOSIS — R73.01 IMPAIRED FASTING GLUCOSE: ICD-10-CM

## 2018-12-18 DIAGNOSIS — F32.5 MAJOR DEPRESSION IN COMPLETE REMISSION (H): Primary | ICD-10-CM

## 2018-12-18 DIAGNOSIS — E78.00 HYPERCHOLESTEROLEMIA: ICD-10-CM

## 2018-12-18 PROCEDURE — 80061 LIPID PANEL: CPT | Performed by: FAMILY MEDICINE

## 2018-12-18 PROCEDURE — 36415 COLL VENOUS BLD VENIPUNCTURE: CPT | Performed by: FAMILY MEDICINE

## 2018-12-18 PROCEDURE — 84460 ALANINE AMINO (ALT) (SGPT): CPT | Performed by: FAMILY MEDICINE

## 2018-12-18 PROCEDURE — 99214 OFFICE O/P EST MOD 30 MIN: CPT | Performed by: FAMILY MEDICINE

## 2018-12-18 RX ORDER — ATORVASTATIN CALCIUM 40 MG/1
40 TABLET, FILM COATED ORAL DAILY
Qty: 90 TABLET | Refills: 1 | Status: SHIPPED | OUTPATIENT
Start: 2018-12-18 | End: 2019-07-08

## 2018-12-18 RX ORDER — LEVOTHYROXINE SODIUM 75 UG/1
75 TABLET ORAL DAILY
Qty: 90 TABLET | Refills: 1 | Status: SHIPPED | OUTPATIENT
Start: 2018-12-18 | End: 2019-06-18

## 2018-12-18 ASSESSMENT — ANXIETY QUESTIONNAIRES
1. FEELING NERVOUS, ANXIOUS, OR ON EDGE: SEVERAL DAYS
6. BECOMING EASILY ANNOYED OR IRRITABLE: SEVERAL DAYS
2. NOT BEING ABLE TO STOP OR CONTROL WORRYING: NOT AT ALL
3. WORRYING TOO MUCH ABOUT DIFFERENT THINGS: NOT AT ALL
GAD7 TOTAL SCORE: 2
5. BEING SO RESTLESS THAT IT IS HARD TO SIT STILL: NOT AT ALL
7. FEELING AFRAID AS IF SOMETHING AWFUL MIGHT HAPPEN: NOT AT ALL
IF YOU CHECKED OFF ANY PROBLEMS ON THIS QUESTIONNAIRE, HOW DIFFICULT HAVE THESE PROBLEMS MADE IT FOR YOU TO DO YOUR WORK, TAKE CARE OF THINGS AT HOME, OR GET ALONG WITH OTHER PEOPLE: NOT DIFFICULT AT ALL

## 2018-12-18 ASSESSMENT — PATIENT HEALTH QUESTIONNAIRE - PHQ9
5. POOR APPETITE OR OVEREATING: NOT AT ALL
SUM OF ALL RESPONSES TO PHQ QUESTIONS 1-9: 3

## 2018-12-18 ASSESSMENT — MIFFLIN-ST. JEOR: SCORE: 973.6

## 2018-12-18 NOTE — NURSING NOTE
"Chief Complaint   Patient presents with     Recheck Medication     /60   Pulse 82   Temp 97.9  F (36.6  C) (Tympanic)   Resp 16   Ht 1.575 m (5' 2\")   Wt 48.5 kg (107 lb)   LMP  (LMP Unknown)   SpO2 98%   Breastfeeding? No   BMI 19.57 kg/m   Estimated body mass index is 19.57 kg/m  as calculated from the following:    Height as of this encounter: 1.575 m (5' 2\").    Weight as of this encounter: 48.5 kg (107 lb).  BP completed using cuff size: large   Nadya Cannon CMA    Health Maintenance Due   Topic Date Due     DTAP/TDAP/TD IMMUNIZATION (1 - Tdap) 04/26/1976     NOAH QUESTIONNAIRE 6 MONTHS  11/29/2018     PHQ-9 Q6 MONTHS  11/29/2018     FALL RISK ASSESSMENT  12/28/2018     Health Maintenance reviewed at today's visit patient asked to schedule/complete:   Depression:  Patient agrees to schedule  Immunizations:  Patient agrees to schedule    "

## 2018-12-18 NOTE — PROGRESS NOTES
SUBJECTIVE:   lAma Moore is a 68 year old female who presents to clinic today for the following health issues:    Glucose Intolerance Follow-Up      Patient is checking blood sugars: not at all    FBS to 110     Diabetic concerns: None     Symptoms of hypoglycemia (low blood sugar): none     Paresthesias (numbness or burning in feet) or sores: No     Date of last diabetic eye exam: 2017    BP Readings from Last 2 Encounters:   05/29/18 120/70   02/15/18 112/80     Hemoglobin A1C (%)   Date Value   06/22/2017 5.4   04/08/2016 5.5     LDL Cholesterol Calculated (mg/dL)   Date Value   12/14/2017 59   06/22/2017 81     Hyperlipidemia:LDL Follow-Up      Rate your low fat/cholesterol diet?: good    Taking statin?  Yes, no muscle aches from 40mgm atorvastatin    Other lipid medications/supplements?:  None    LDL normal     Depression and Anxiety Follow-Up      Status since last visit: No change-in remission     Other associated symptoms:None    Complicating factors:     Significant life event: No     Current substance abuse: None    PHQ-9 6/30/2016 12/14/2017 5/29/2018   Total Score 1 2 0   Q9: Suicide Ideation Not at all Not at all Not at all     NOAH-7 SCORE 12/14/2017 5/29/2018   Total Score - 2 (minimal anxiety)   Total Score 1 2   PHQ-9  English=3  PHQ-9   Any Language  NOAH-7 =2  Suicide Assessment Five-step Evaluation and Treatment (SAFE-T)    Hypothyroidism Follow-up      Since last visit, patient describes the following symptoms: Weight stable, no hair loss, no skin changes, no constipation, no loose stools    Levothyroxine 75 mgm     TSH= OK 6-18    PROTEIN-CALORIE MALNUTRITION      -BMI  Low     - stable     -active pt     HX OF OVARIAN CA     - distant past   -no further f/u needed   -conts in remission > 10 yrs         Amount of exercise or physical activity: None    Problems taking medications regularly: No    Medication side effects: none    Diet: regular (no restrictions)        Problem list and  "histories reviewed & adjusted, as indicated.  Additional history: as documented    Labs reviewed in EPIC    Reviewed and updated as needed this visit by clinical staff  Tobacco  Allergies  Meds  Problems  Med Hx  Surg Hx  Fam Hx  Soc Hx        Reviewed and updated as needed this visit by Provider  Tobacco  Allergies  Meds  Problems  Med Hx  Surg Hx  Fam Hx         ROS:  CONSTITUTIONAL: NEGATIVE for fever, chills, change in weight  INTEGUMENTARY/SKIN: NEGATIVE for worrisome rashes, moles or lesions  EYES: NEGATIVE for vision changes or irritation  ENT/MOUTH: NEGATIVE for ear, mouth and throat problems  RESP: NEGATIVE for significant cough or SOB  BREAST: NEGATIVE for masses, tenderness or discharge  CV: NEGATIVE for chest pain, palpitations or peripheral edema  GI: NEGATIVE for nausea, abdominal pain, heartburn, or change in bowel habits  : NEGATIVE for frequency, dysuria, or hematuria  MUSCULOSKELETAL: NEGATIVE for significant arthralgias or myalgia  NEURO: NEGATIVE for weakness, dizziness or paresthesias  ENDOCRINE: NEGATIVE for temperature intolerance, skin/hair changes  HEME: NEGATIVE for bleeding problems  PSYCHIATRIC: NEGATIVE for changes in mood or affect    OBJECTIVE:     /60   Pulse 82   Temp 97.9  F (36.6  C) (Tympanic)   Resp 16   Ht 1.575 m (5' 2\")   Wt 48.5 kg (107 lb)   LMP  (LMP Unknown)   SpO2 98%   Breastfeeding? No   BMI 19.57 kg/m    Body mass index is 19.57 kg/m .  GENERAL: healthy, alert and no distress; under wt   EYES: Eyes grossly normal to inspection, PERRL and conjunctivae and sclerae normal  RESP: lungs clear to auscultation - no rales, rhonchi or wheezes  CV: regular rate and rhythm, normal S1 S2, no S3 or S4, no murmur, click or rub, no peripheral edema and peripheral pulses strong  MS: no gross musculoskeletal defects noted, no edema  SKIN: no suspicious lesions or rashes  NEURO: Normal strength and tone, mentation intact and speech normal  PSYCH: mentation " appears normal, affect normal/bright, anxious, speech pressured and appearance well groomed        ASSESSMENT/PLAN:               ICD-10-CM    1. 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 Hypercholesterolemia E78.00 atorvastatin (LIPITOR) 40 MG tablet     ALT     Lipid panel reflex to direct LDL Fasting   3. Acquired hypothyroidism E03.9 levothyroxine (SYNTHROID/LEVOTHROID) 75 MCG tablet   4. Malignant neoplasm of ovary, unspecified laterality (H) C56.9    5. Impaired fasting glucose R73.01        Vale Wiseman MD  [facility]

## 2018-12-19 LAB
ALT SERPL W P-5'-P-CCNC: 25 U/L (ref 0–50)
CHOLEST SERPL-MCNC: 173 MG/DL
HDLC SERPL-MCNC: 93 MG/DL
LDLC SERPL CALC-MCNC: 64 MG/DL
NONHDLC SERPL-MCNC: 80 MG/DL
TRIGL SERPL-MCNC: 82 MG/DL

## 2018-12-19 ASSESSMENT — ANXIETY QUESTIONNAIRES: GAD7 TOTAL SCORE: 2

## 2018-12-20 NOTE — RESULT ENCOUNTER NOTE
.  Please see attached lab results  They are all normal     THE FOLLOWING ARE EXPLANATIONS OF SOME OF OUR LAB TESTS     YOU DID NOT NECESSARILY HAVE ALL OF THESE DONE     Hgb is the blood iron level  WBC means White Blood Cells  Platelets are small blood   cells that help with forming the blood clots along with other blood factors.  Electrolytes are Sodium, Potassium, Calcium, Magnesium, Phosphorus.  Liver tests are: AST, ALT, Bilirubin, Alkaline Phosphatase.  Kidney tests are Creatinine, GFR.  HDL Choles  terol - is the good cholesterol and it is good to have it high.  LDL cholesterol is the bad cholesterol and it is good to have it low.  It is recommended to have LDL less than 130 for people with hypertension and to have it less than 100 for people with   heart disease, diabetes and chronic kidney disease.  Triglycerides are another type of lipid that can cause heart disease, like the cholesterol and should be kept low   Thyroid tests are TSH, T4, T3  Glucose is sugar.  A1c is a test that gives us an idea   about how well was controlled the diabetes for the last 3 months.   PSA stands for Prostate Specific Antigen and it can be elevated with prostate cancer or prostate inflammation.    Please continue on the same medications

## 2019-03-15 ENCOUNTER — OFFICE VISIT (OUTPATIENT)
Dept: FAMILY MEDICINE | Facility: CLINIC | Age: 69
End: 2019-03-15
Payer: COMMERCIAL

## 2019-03-15 VITALS
WEIGHT: 112 LBS | HEART RATE: 72 BPM | HEIGHT: 62 IN | RESPIRATION RATE: 16 BRPM | DIASTOLIC BLOOD PRESSURE: 80 MMHG | TEMPERATURE: 98.5 F | BODY MASS INDEX: 20.61 KG/M2 | OXYGEN SATURATION: 97 % | SYSTOLIC BLOOD PRESSURE: 120 MMHG

## 2019-03-15 DIAGNOSIS — Z00.00 MEDICARE ANNUAL WELLNESS VISIT, SUBSEQUENT: Primary | ICD-10-CM

## 2019-03-15 DIAGNOSIS — F32.5 MAJOR DEPRESSION IN COMPLETE REMISSION (H): ICD-10-CM

## 2019-03-15 DIAGNOSIS — S06.0X0A CONCUSSION WITHOUT LOSS OF CONSCIOUSNESS, INITIAL ENCOUNTER: ICD-10-CM

## 2019-03-15 DIAGNOSIS — C56.9 MALIGNANT NEOPLASM OF OVARY, UNSPECIFIED LATERALITY (H): ICD-10-CM

## 2019-03-15 DIAGNOSIS — E44.1 MILD PROTEIN-CALORIE MALNUTRITION (H): ICD-10-CM

## 2019-03-15 PROCEDURE — G0439 PPPS, SUBSEQ VISIT: HCPCS | Performed by: FAMILY MEDICINE

## 2019-03-15 PROCEDURE — 99214 OFFICE O/P EST MOD 30 MIN: CPT | Mod: 25 | Performed by: FAMILY MEDICINE

## 2019-03-15 ASSESSMENT — MIFFLIN-ST. JEOR: SCORE: 996.28

## 2019-03-15 ASSESSMENT — ACTIVITIES OF DAILY LIVING (ADL): CURRENT_FUNCTION: NO ASSISTANCE NEEDED

## 2019-03-15 ASSESSMENT — PATIENT HEALTH QUESTIONNAIRE - PHQ9
5. POOR APPETITE OR OVEREATING: NOT AT ALL
SUM OF ALL RESPONSES TO PHQ QUESTIONS 1-9: 4

## 2019-03-15 ASSESSMENT — ANXIETY QUESTIONNAIRES
IF YOU CHECKED OFF ANY PROBLEMS ON THIS QUESTIONNAIRE, HOW DIFFICULT HAVE THESE PROBLEMS MADE IT FOR YOU TO DO YOUR WORK, TAKE CARE OF THINGS AT HOME, OR GET ALONG WITH OTHER PEOPLE: NOT DIFFICULT AT ALL
2. NOT BEING ABLE TO STOP OR CONTROL WORRYING: NOT AT ALL
7. FEELING AFRAID AS IF SOMETHING AWFUL MIGHT HAPPEN: NOT AT ALL
GAD7 TOTAL SCORE: 2
1. FEELING NERVOUS, ANXIOUS, OR ON EDGE: SEVERAL DAYS
3. WORRYING TOO MUCH ABOUT DIFFERENT THINGS: NOT AT ALL
5. BEING SO RESTLESS THAT IT IS HARD TO SIT STILL: NOT AT ALL
6. BECOMING EASILY ANNOYED OR IRRITABLE: SEVERAL DAYS

## 2019-03-15 NOTE — PATIENT INSTRUCTIONS
Services Typically covered by Medicare Recommended Completed   Vaccines    Pneumonoccol    Influenza    Hepatitis B (if medium/high risk)     Once for patients after age 65    Yearly  Medium/high risk factors:    End Stage Kidney Disease    Hemophiliacs who received Factor XIII or IX concentrates    Clients of institutions for developmentally disabled    Persons who live in same house as a Hepatitis B carrier    Homosexual men    Illicit injectable drug users    Health care workers     Mammogram Covered: One-time screen between age 35-39, annually for age 40+     Pap and Pelvic Exam Covered: Annually if  high risk,  or childbearing age with abnormal Pap in last 3 years.  Q24 months for all other women     Prostate Cancer Screening    Digital rectal exam    PSA Covered: Annually for all men > age 50     Corolrectal Cancer Screening Screening colonoscopy every 10 years, more often for high risk patients     Diabetes Self-Management Training Requires referral by treating physician for patient with diabetes     Diabetes Screening    Fasting blood sugar or glucose tolerance test   Once yearly, twice yearly if prediabetic     Cardiovascular Screening Blood Tests    Total Cholesterol    HDL    Triglycerides Every 5 years     Medical Nutrition Therapy for Diabetes or Renal Disease Requires referral by treating physician for patient with diabetes or kidney disease     Glaucoma Screening Annually for patients with one of the following risk factors:    Diabetes Mellitus    Family history of Glaucoma    -American age 50 and over    -American age 65 and over     Bone Mass Measurement Every 24 months if one of the following risk factors:    Estrogen deficiency    Vertebral abnormalities on x-ray indicative of Osteoporosis, Osteopenia, or Vertebral fracture    Receiving/expected to receive the equivalent of at least 5 mg of Prednisone per day for > 3 months    Hyperparathyroidism    Patient being monitored for  response to Osteoporosis Therapy     One-time AAA screen  Must be ordered as part of Medicare IPPE   Any patient with a family history of AAA    Males Age 65-75, with history of smoking at least 100 cigarettes in lifetime     Smoking Cessation Counseling Beneficiaries who use tobacco are eligible to receive 2 cessation attempts per year; each attempt includes maximum of 4 sessions     HIV Screening Annually for beneficiaries at increased risk:       Increased risk for HIV infection is defined in the  National Coverage Determinations (NCD) Manual,  Publication 100-03 Sections 190.14 (diagnostic) and 210.7 (screening). See http://www.cms.gov/manuals/downloads/zvc783x2_Wndq2.pdf and http://www.cms.gov/manuals/downloads/xtw827k9_Bpxq4.pdf on the Internet.  Three times per pregnancy for beneficiaries who are pregnant.     Future Annual Wellness Visit Annually, for all beneficiaries.       Preventive Health Recommendations    See your health care provider every year to    Review health changes.     Discuss preventive care.      Review your medicines if your doctor has prescribed any.      You no longer need a yearly Pap test unless you've had an abnormal Pap test in the past 10 years. If you have vaginal symptoms, such as bleeding or discharge, be sure to talk with your provider about a Pap test.      Every 1 to 2 years, have a mammogram.  If you are over 69, talk with your health care provider about whether or not you want to continue having screening mammograms.      Every 10 years, have a colonoscopy. Or, have a yearly FIT test (stool test). These exams will check for colon cancer.       Have a cholesterol test every 5 years, or more often if your doctor advises it.       Have a diabetes test (fasting glucose) every three years. If you are at risk for diabetes, you should have this test more often.       At age 65, have a bone density scan (DEXA) to check for osteoporosis (brittle bone disease).    Shots:    Get a flu  shot each year.    Get a tetanus shot every 10 years.    Talk to your doctor about your pneumonia vaccines. There are now two you should receive - Pneumovax (PPSV 23) and Prevnar (PCV 13).    Talk to your pharmacist about the shingles vaccine.    Talk to your doctor about the hepatitis B vaccine.    Nutrition:     Eat at least 5 servings of fruits and vegetables each day.      Eat whole-grain bread, whole-wheat pasta and brown rice instead of white grains and rice.      Get adequate Calcium and Vitamin D.     Lifestyle    Exercise at least 150 minutes a week (30 minutes a day, 5 days a week). This will help you control your weight and prevent disease.      Limit alcohol to one drink per day.      No smoking.       Wear sunscreen to prevent skin cancer.       See your dentist twice a year for an exam and cleaning.      See your eye doctor every 1 to 2 years to screen for conditions such as glaucoma, macular degeneration and cataracts.    Personalized Prevention Plan  You are due for the preventive services outlined below.  Your care team is available to assist you in scheduling these services.  If you have already completed any of these items, please share that information with your care team to update in your medical record.  Health Maintenance Due   Topic Date Due     Annual Wellness Visit  04/26/2016     1. Please do your breast exam every mo, when you  Change the  calendar page or set an alarm on your cell phone Do a  visual check for dimples, inversion or indentation or any different position of the nipple Feel manually  for any 1cm or larger  size mass ie about the size of an almond Be sure to cover the entire area of both breasts : this extends back to the back on either side and from the collar bone to the bottom of the breasts where you can begin to feel ribs.  Men are advised to do this exam also as they now have a higher rate of breast cancer , like women do .     2. . Schedule your mammo at [facility]  At [address] at [phone]      3. No difference was  Noted by patients in a double blind study when given codeine, tylenol ( acetaminophen) or ibuprofen (all in identical pills). They felt no difference in pain relief. Since ibuprofen and the NSAIDs  causes kidney damage, esophageal damage with heartburn, and can increase the risk of esophageal and stomach cancer and ulcers,and colonic strictures. They also cause increased risk of heart attack .   I recommend that you use tylenol(acetaminophen) for pain. Use the acetaminophen ES  Which has 500mgm/tablet You can take up to 2 tablets 4 times a day as need for pain.  If this is not enough, you can add in ibuprofen or aleve(naprosyn) with 2 glasses of fluid and some food-to protect the stomach and esophagus. Please let us know if you are continuing to take ibuprofen or aleve, as we will need to periodically check your kidney function with a blood test.    3. Avoid screens, stress, caffeine , lack of food

## 2019-03-15 NOTE — LETTER
My Depression Action Plan  Name: Alma Moore   Date of Birth 1950  Date: 3/15/2019    My doctor: Vale Wiseman   My clinic: [address] [address]  [phone]          GREEN    ZONE   Good Control    What it looks like:     Things are going generally well. You have normal up s and down s. You may even feel depressed from time to time, but bad moods usually last less than a day.   What you need to do:  1. Continue to care for yourself (see self care plan)  2. Check your depression survival kit and update it as needed  3. Follow your physician s recommendations including any medication.  4. Do not stop taking medication unless you consult with your physician first.           YELLOW         ZONE Getting Worse    What it looks like:     Depression is starting to interfere with your life.     It may be hard to get out of bed; you may be starting to isolate yourself from others.    Symptoms of depression are starting to last most all day and this has happened for several days.     You may have suicidal thoughts but they are not constant.   What you need to do:     1. Call your care team, your response to treatment will improve if you keep your care team informed of your progress. Yellow periods are signs an adjustment may need to be made.     2. Continue your self-care, even if you have to fake it!    3. Talk to someone in your support network    4. Open up your depression survival kit           RED    ZONE Medical Alert - Get Help    What it looks like:     Depression is seriously interfering with your life.     You may experience these or other symptoms: You can t get out of bed most days, can t work or engage in other necessary activities, you have trouble taking care of basic hygiene, or basic responsibilities, thoughts of suicide or death that will not go away, self-injurious behavior.     What you  need to do:  1. Call your care team and request a same-day appointment. If they are not available (weekends or after hours) call your local crisis line, emergency room or 911.            Depression Self Care Plan / Survival Kit    Self-Care for Depression  Here s the deal. Your body and mind are really not as separate as most people think.  What you do and think affects how you feel and how you feel influences what you do and think. This means if you do things that people who feel good do, it will help you feel better.  Sometimes this is all it takes.  There is also a place for medication and therapy depending on how severe your depression is, so be sure to consult with your medical provider and/ or Behavioral Health Consultant if your symptoms are worsening or not improving.     In order to better manage my stress, I will:    Exercise  Get some form of exercise, every day. This will help reduce pain and release endorphins, the  feel good  chemicals in your brain. This is almost as good as taking antidepressants!  This is not the same as joining a gym and then never going! (they count on that by the way ) It can be as simple as just going for a walk or doing some gardening, anything that will get you moving.      Hygiene   Maintain good hygiene (Get out of bed in the morning, Make your bed, Brush your teeth, Take a shower, and Get dressed like you were going to work, even if you are unemployed).  If your clothes don't fit try to get ones that do.    Diet  I will strive to eat foods that are good for me, drink plenty of water, and avoid excessive sugar, caffeine, alcohol, and other mood-altering substances.  Some foods that are helpful in depression are: complex carbohydrates, B vitamins, flaxseed, fish or fish oil, fresh fruits and vegetables.    Psychotherapy  I agree to participate in Individual Therapy (if recommended).    Medication  If prescribed medications, I agree to take them.  Missing doses can result in  serious side effects.  I understand that drinking alcohol, or other illicit drug use, may cause potential side effects.  I will not stop my medication abruptly without first discussing it with my provider.    Staying Connected With Others  I will stay in touch with my friends, family members, and my primary care provider/team.    Use your imagination  Be creative.  We all have a creative side; it doesn t matter if it s oil painting, sand castles, or mud pies! This will also kick up the endorphins.    Witness Beauty  (AKA stop and smell the roses) Take a look outside, even in mid-winter. Notice colors, textures. Watch the squirrels and birds.     Service to others  Be of service to others.  There is always someone else in need.  By helping others we can  get out of ourselves  and remember the really important things.  This also provides opportunities for practicing all the other parts of the program.    Humor  Laugh and be silly!  Adjust your TV habits for less news and crime-drama and more comedy.    Control your stress  Try breathing deep, massage therapy, biofeedback, and meditation. Find time to relax each day.     My support system    Clinic Contact:  Phone number:    Contact 1:  Phone number:    Contact 2:  Phone number:    Jew/:  Phone number:    Therapist:  Phone number:    Local crisis center:    Phone number:    Other community support:  Phone number:
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
## 2019-03-15 NOTE — NURSING NOTE
"Chief Complaint   Patient presents with     Wellness Visit     Head Injury     /80   Pulse 72   Temp 98.5  F (36.9  C) (Tympanic)   Resp 16   Ht 1.575 m (5' 2\")   Wt 50.8 kg (112 lb)   LMP  (LMP Unknown)   SpO2 97%   Breastfeeding? No   BMI 20.49 kg/m   Estimated body mass index is 20.49 kg/m  as calculated from the following:    Height as of this encounter: 1.575 m (5' 2\").    Weight as of this encounter: 50.8 kg (112 lb).  BP completed using cuff size: regular   Nadya Cannon CMA    There are no preventive care reminders to display for this patient.  Health Maintenance reviewed at today's visit patient asked to schedule/complete:   None, Health Maintenance up to date.    "

## 2019-03-16 ASSESSMENT — ANXIETY QUESTIONNAIRES: GAD7 TOTAL SCORE: 2

## 2019-03-18 ENCOUNTER — TELEPHONE (OUTPATIENT)
Dept: FAMILY MEDICINE | Facility: CLINIC | Age: 69
End: 2019-03-18
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
## 2019-03-29 ENCOUNTER — TELEPHONE (OUTPATIENT)
Dept: FAMILY MEDICINE | Facility: CLINIC | Age: 69
End: 2019-03-29

## 2019-03-29 NOTE — TELEPHONE ENCOUNTER
FYI-  Pt asked for how long she should be away from TV, computers screens or dancing. She reports improvement of HA . Pt was advised after two weeks she may try to resume activities as tolerated. FYI- no further action needed if agree with plan above.
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
## 2019-05-15 ENCOUNTER — TELEPHONE (OUTPATIENT)
Dept: FAMILY MEDICINE | Facility: CLINIC | Age: 69
End: 2019-05-15

## 2019-05-16 RX ORDER — LANOLIN ALCOHOL/MO/W.PET/CERES
1 CREAM (GRAM) TOPICAL
Status: CANCELLED | OUTPATIENT
Start: 2019-05-16

## 2019-05-16 NOTE — TELEPHONE ENCOUNTER
Last OV 3/15/2019. Requesting melatonin to help her sleep. Medication pended for provider review.

## 2019-06-11 ENCOUNTER — OFFICE VISIT (OUTPATIENT)
Dept: FAMILY MEDICINE | Facility: CLINIC | Age: 69
End: 2019-06-11
Payer: COMMERCIAL

## 2019-06-11 VITALS
RESPIRATION RATE: 14 BRPM | TEMPERATURE: 97.5 F | HEART RATE: 71 BPM | SYSTOLIC BLOOD PRESSURE: 116 MMHG | OXYGEN SATURATION: 97 % | HEIGHT: 62 IN | WEIGHT: 105 LBS | DIASTOLIC BLOOD PRESSURE: 60 MMHG | BODY MASS INDEX: 19.32 KG/M2

## 2019-06-11 DIAGNOSIS — Z83.3 FAMILY HISTORY OF DIABETES MELLITUS: ICD-10-CM

## 2019-06-11 DIAGNOSIS — R73.01 IMPAIRED FASTING GLUCOSE: ICD-10-CM

## 2019-06-11 DIAGNOSIS — E78.00 HYPERCHOLESTEROLEMIA: Primary | ICD-10-CM

## 2019-06-11 DIAGNOSIS — F32.5 MAJOR DEPRESSION IN COMPLETE REMISSION (H): ICD-10-CM

## 2019-06-11 DIAGNOSIS — E44.1 MILD PROTEIN-CALORIE MALNUTRITION (H): ICD-10-CM

## 2019-06-11 DIAGNOSIS — E03.9 ACQUIRED HYPOTHYROIDISM: ICD-10-CM

## 2019-06-11 PROCEDURE — 99214 OFFICE O/P EST MOD 30 MIN: CPT | Performed by: FAMILY MEDICINE

## 2019-06-11 PROCEDURE — 80061 LIPID PANEL: CPT | Performed by: FAMILY MEDICINE

## 2019-06-11 PROCEDURE — 84443 ASSAY THYROID STIM HORMONE: CPT | Performed by: FAMILY MEDICINE

## 2019-06-11 PROCEDURE — 84460 ALANINE AMINO (ALT) (SGPT): CPT | Performed by: FAMILY MEDICINE

## 2019-06-11 PROCEDURE — 84439 ASSAY OF FREE THYROXINE: CPT | Performed by: FAMILY MEDICINE

## 2019-06-11 PROCEDURE — 36415 COLL VENOUS BLD VENIPUNCTURE: CPT | Performed by: FAMILY MEDICINE

## 2019-06-11 ASSESSMENT — MIFFLIN-ST. JEOR: SCORE: 959.53

## 2019-06-11 NOTE — LETTER
June 18, 2019      Alma Moore  [address] [facility] [address]        Dear ,    We are writing to inform you of your test results.    All of your lab results are normal, except as noted below.     The following are explanations of some of our lab tests     THIS DOES NOT MEAN THAT YOU HAD ALL OF THESE DONE     Please continue on the same medications unless   a change is noted above     These are some general explanations for tests:     Hgb is the blood iron level   WBC means White Blood Cells   Platelets are small blood cells that help with forming the blood clots along with other blood factors.   Electrolytes ar   e Sodium, Potassium, Calcium, Magnesium, Phosphorus.   Liver tests are: AST, ALT, Bilirubin, Alkaline Phosphatase.   Kidney tests are Creatinine, GFR.   HDL Cholesterol - is the good cholesterol and it is good to have it high.   LDL cholesterol is the bad ch   olesterol and it is good to have it low.   It is recommended to have LDL less than 130 for people with hypertension and to have it less than 100 for people with heart disease, diabetes and chronic kidney disease.   Triglycerides are another type of lipid a   nd can also cause heart disease so should be kept low.   Thyroid tests are TSH, T4, T3###you are hyperthyroid on 75mcg of levothyroxine   A day  DECREASE  To 50mcg a day and rechek lab in 6-8 weeks   The prescription(s) have been sent to your pharmacy electronically and the future lab has been ordered. Please call us at [phone] to make a lab appointment.     Glucose is sugar   A1c is a test that gives us an idea about how well was controlled the diabetes for the last 3 months.   PSA stands for Prostate Specific Antigen and    it can be elevated with prostate cancer or prostate inflammation.    Resulted Orders   ALT   Result Value Ref Range    ALT 29 0 - 50 U/L   Lipid panel reflex to direct LDL Fasting   Result Value Ref Range    Cholesterol 192 <200 mg/dL     Triglycerides 57 <150 mg/dL      Comment:      Non Fasting    HDL Cholesterol 114 >49 mg/dL    LDL Cholesterol Calculated 67 <100 mg/dL      Comment:      Desirable:       <100 mg/dl    Non HDL Cholesterol 78 <130 mg/dL   TSH WITH FREE T4 REFLEX   Result Value Ref Range    TSH 0.24 (L) 0.40 - 4.00 mU/L   T4 free   Result Value Ref Range    T4 Free 1.12 0.76 - 1.46 ng/dL       If you have any questions or concerns, please call the clinic at the number listed above.       Sincerely,        Vale Wiseman MD

## 2019-06-11 NOTE — NURSING NOTE
"Chief Complaint   Patient presents with     Gyn Exam     Recheck Medication     /60   Pulse 71   Temp 97.5  F (36.4  C) (Tympanic)   Resp 14   Ht 1.575 m (5' 2\")   Wt 47.6 kg (105 lb)   LMP  (LMP Unknown)   SpO2 97%   Breastfeeding? No   BMI 19.20 kg/m   Estimated body mass index is 19.2 kg/m  as calculated from the following:    Height as of this encounter: 1.575 m (5' 2\").    Weight as of this encounter: 47.6 kg (105 lb).  BP completed using cuff size: regular   Nadya Cannon CMA    Health Maintenance Due   Topic Date Due     TSH W/FREE T4 REFLEX  05/29/2019     Health Maintenance reviewed at today's visit patient asked to schedule/complete:   None, Health Maintenance up to date.    "

## 2019-06-11 NOTE — PROGRESS NOTES
Subjective     Alma Moore is a 69 year old female who presents to clinic today for the following health issues:    HPI           History of Cervical Cancer s/po RUBI / BSO and exploratory in 2012 so none needed     Glucose Intolerance Follow-Up      Patient is checking blood sugars: not at all    FBS to 110     Diabetic concerns: None     Symptoms of hypoglycemia (low blood sugar): none     Paresthesias (numbness or burning in feet) or sores: No     Date of last diabetic eye exam: 2017    BP Readings from Last 2 Encounters:   05/29/18 120/70   02/15/18 112/80     Hemoglobin A1C (%)   Date Value   06/22/2017 5.4   04/08/2016 5.5     LDL Cholesterol Calculated (mg/dL)   Date Value   12/14/2017 59   06/22/2017 81     Hyperlipidemia:LDL Follow-Up      Rate your low fat/cholesterol diet?: good    Taking statin?  Yes, no muscle aches from 40mgm atorvastatin    Other lipid medications/supplements?:  None    LDL normal     Depression and Anxiety Follow-Up      Status since last visit: No change-in remission     Other associated symptoms:None    Complicating factors:     Significant life event: No     Current substance abuse: None    PHQ-9 6/30/2016 12/14/2017 5/29/2018   Total Score 1 2 0   Q9: Suicide Ideation Not at all Not at all Not at all     NOAH-7 SCORE 12/14/2017 5/29/2018   Total Score - 2 (minimal anxiety)   Total Score 1 2   PHQ-9  English=3  PHQ-9   Any Language  NOAH-7 =2  Suicide Assessment Five-step Evaluation and Treatment (SAFE-T)    Hypothyroidism Follow-up      Since last visit, patient describes the following symptoms: Weight stable, no hair loss, no skin changes, no constipation, no loose stools    Levothyroxine 75 mgm     TSH= OK 6-18    PROTEIN-CALORIE MALNUTRITION      -BMI  Low     - stable     -active pt     HX OF OVARIAN CA       - distant past     -no further f/u needed     -conts in remission > 10 yrs   --aHistory of Cervical Cancer s/po RUBI / BSO and exploratory in 2012 so none needed  "      Amount of exercise or physical activity: None    Problems taking medications regularly: No    Medication side effects: none    Diet: regular (no restrictions)              Reviewed and updated as needed this visit by Provider  Tobacco  Allergies  Meds  Problems  Med Hx  Surg Hx  Fam Hx          Review of Systems   ROS COMP: CONSTITUTIONAL: NEGATIVE for fever, chills, change in weight  INTEGUMENTARY/SKIN: NEGATIVE for worrisome rashes, moles or lesions  EYES: NEGATIVE for vision changes or irritation  ENT/MOUTH: NEGATIVE for ear, mouth and throat problems  RESP: NEGATIVE for significant cough or SOB  BREAST: NEGATIVE for masses, tenderness or discharge  CV: NEGATIVE for chest pain, palpitations or peripheral edema  GI: NEGATIVE for nausea, abdominal pain, heartburn, or change in bowel habits  : NEGATIVE for frequency, dysuria, or hematuria  MUSCULOSKELETAL: NEGATIVE for significant arthralgias or myalgia  NEURO: NEGATIVE for weakness, dizziness or paresthesias  ENDOCRINE: NEGATIVE for temperature intolerance, skin/hair changes  HEME: NEGATIVE for bleeding problems  PSYCHIATRIC: NEGATIVE for changes in mood or affect      Objective    /60   Pulse 71   Temp 97.5  F (36.4  C) (Tympanic)   Resp 14   Ht 1.575 m (5' 2\")   Wt 47.6 kg (105 lb)   LMP  (LMP Unknown)   SpO2 97%   Breastfeeding? No   BMI 19.20 kg/m    Body mass index is 19.2 kg/m .  Physical Exam   GENERAL: healthy, alert, no distress and under wt     EYES: Eyes grossly normal to inspection, PERRL and conjunctivae and sclerae normal  RESP: lungs clear to auscultation - no rales, rhonchi or wheezes  CV: regular rate and rhythm, normal S1 S2, no S3 or S4, no murmur, click or rub, no peripheral edema and peripheral pulses strong  MS: no gross musculoskeletal defects noted, no edema  SKIN: no suspicious lesions or rashes  NEURO: Normal strength and tone, mentation intact and speech normal  PSYCH: mentation appears normal, affect " normal/bright    Diagnostic Test Results:  Labs reviewed in Epic  Results for orders placed or performed in visit on 06/11/19   ALT   Result Value Ref Range    ALT 29 0 - 50 U/L   Lipid panel reflex to direct LDL Fasting   Result Value Ref Range    Cholesterol 192 <200 mg/dL    Triglycerides 57 <150 mg/dL    HDL Cholesterol 114 >49 mg/dL    LDL Cholesterol Calculated 67 <100 mg/dL    Non HDL Cholesterol 78 <130 mg/dL   TSH WITH FREE T4 REFLEX   Result Value Ref Range    TSH 0.24 (L) 0.40 - 4.00 mU/L   T4 free   Result Value Ref Range    T4 Free 1.12 0.76 - 1.46 ng/dL           Assessment & Plan       ICD-10-CM    1. Hypercholesterolemia E78.00 ALT     Lipid panel reflex to direct LDL Fasting     T4 free     T4 free   2. Acquired hypothyroidism E03.9 TSH WITH FREE T4 REFLEX   3. Impaired fasting glucose R73.01    4. Family history of diabetes mellitus Z83.3    5. 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 Mild protein-calorie malnutrition (H) E44.1 aspirin (ASA) 81 MG tablet          Patient Instructions   1. Stop the aspirin       Return in about 6 months (around 12/11/2019) for thyroid, impaired glucose, cholesterol.    Vale Wiseman MD  [facility]

## 2019-06-12 LAB
ALT SERPL W P-5'-P-CCNC: 29 U/L (ref 0–50)
CHOLEST SERPL-MCNC: 192 MG/DL
HDLC SERPL-MCNC: 114 MG/DL
LDLC SERPL CALC-MCNC: 67 MG/DL
NONHDLC SERPL-MCNC: 78 MG/DL
T4 FREE SERPL-MCNC: 1.12 NG/DL (ref 0.76–1.46)
TRIGL SERPL-MCNC: 57 MG/DL
TSH SERPL DL<=0.005 MIU/L-ACNC: 0.24 MU/L (ref 0.4–4)

## 2019-06-14 ENCOUNTER — TELEPHONE (OUTPATIENT)
Dept: FAMILY MEDICINE | Facility: CLINIC | Age: 69
End: 2019-06-14

## 2019-06-14 NOTE — TELEPHONE ENCOUNTER
Reason for Call:  Other call back    Detailed comments: Patient is wondering if Dr Wiseman could recommend a doctor at the [facility] as that clinic is closer to her    Phone Number Patient can be reached at: Home number on file [phone] (home)    Best Time: any    Can we leave a detailed message on this number? YES    Call taken on 6/14/2019 at 7:28 AM by MICHAEL REYES

## 2019-06-14 NOTE — TELEPHONE ENCOUNTER
Called pt and let her know that Dr. Wiseman does not know any providers at the [facility].

## 2019-06-14 NOTE — TELEPHONE ENCOUNTER
See message below - pt requesting provider recommendation for provider at [facility].

## 2019-06-17 ENCOUNTER — TELEPHONE (OUTPATIENT)
Dept: FAMILY MEDICINE | Facility: CLINIC | Age: 69
End: 2019-06-17

## 2019-06-17 NOTE — TELEPHONE ENCOUNTER
Reason for Call:  Other     Detailed comments: checked on my chart and thyroid results seemed low.  Wants information on that    Phone Number Patient can be reached at: Home number on file [phone] (home) or cell    Best Time: today    Can we leave a detailed message on this number? YES    Call taken on 6/17/2019 at 8:41 AM by YOANNA HILL

## 2019-06-18 ENCOUNTER — TELEPHONE (OUTPATIENT)
Dept: FAMILY MEDICINE | Facility: CLINIC | Age: 69
End: 2019-06-18

## 2019-06-18 DIAGNOSIS — E03.9 ACQUIRED HYPOTHYROIDISM: Primary | ICD-10-CM

## 2019-06-18 RX ORDER — LEVOTHYROXINE SODIUM 50 UG/1
50 TABLET ORAL DAILY
Qty: 90 TABLET | Refills: 0 | Status: SHIPPED | OUTPATIENT
Start: 2019-06-18 | End: 2019-08-23

## 2019-06-18 NOTE — TELEPHONE ENCOUNTER
Reason for Call:  Other call back    Detailed comments:   Patient receive message via my chart stating she was hyperthyroid   She thought she was hypothyroid    Please call to clarify    Phone Number Patient can be reached at: Cell number on file:    Telephone Information:   Mobile [phone]       Best Time: anaytime    Can we leave a detailed message on this number? YES    Call taken on 6/18/2019 at 11:39 AM by BARRINGTON JAUREGUI

## 2019-06-18 NOTE — RESULT ENCOUNTER NOTE
Please see attached lab results  All of your lab results are normal, except as noted below.    The following are explanations of some of our lab tests    THIS DOES NOT MEAN THAT YOU HAD ALL OF THESE DONE    Please continue on the same medications unless   a change is noted above    These are some general explanations for tests:    Hgb is the blood iron level  WBC means White Blood Cells  Platelets are small blood cells that help with forming the blood clots along with other blood factors.  Electrolytes ar  e Sodium, Potassium, Calcium, Magnesium, Phosphorus.  Liver tests are: AST, ALT, Bilirubin, Alkaline Phosphatase.  Kidney tests are Creatinine, GFR.  HDL Cholesterol - is the good cholesterol and it is good to have it high.  LDL cholesterol is the bad ch  olesterol and it is good to have it low.  It is recommended to have LDL less than 130 for people with hypertension and to have it less than 100 for people with heart disease, diabetes and chronic kidney disease.  Triglycerides are another type of lipid a  nd can also cause heart disease so should be kept low.   Thyroid tests are TSH, T4, T3###you are hyperthyroid on 75mcg of levothyroxine  A day  DECREASE  To 50mcg a day and rechek lab in 6-8 weeks  The prescription(s) have been sent to your pharmacy electronically and the future lab has been ordered. Please call us at [phone] to make a lab appointment.     Glucose is sugar  A1c is a test that gives us an idea about how well was controlled the diabetes for the last 3 months.   PSA stands for Prostate Specific Antigen and   it can be elevated with prostate cancer or prostate inflammation.

## 2019-06-18 NOTE — TELEPHONE ENCOUNTER
Called patient regarding questions. Clarified that patient is currently hyperthyroid and should reduce medication to 50 mcg. Pt verbalizes understanding. No further action needed.

## 2019-06-20 ENCOUNTER — HOSPITAL ENCOUNTER (OUTPATIENT)
Dept: MAMMOGRAPHY | Facility: CLINIC | Age: 69
Discharge: HOME | End: 2019-06-20
Attending: FAMILY MEDICINE | Admitting: FAMILY MEDICINE
Payer: COMMERCIAL

## 2019-06-20 DIAGNOSIS — Z12.31 VISIT FOR SCREENING MAMMOGRAM: ICD-10-CM

## 2019-06-20 PROCEDURE — 77063 BREAST TOMOSYNTHESIS BI: CPT

## 2019-07-08 DIAGNOSIS — E78.00 HYPERCHOLESTEROLEMIA: ICD-10-CM

## 2019-07-09 RX ORDER — ATORVASTATIN CALCIUM 40 MG/1
TABLET, FILM COATED ORAL
Qty: 90 TABLET | Refills: 2 | Status: SHIPPED | OUTPATIENT
Start: 2019-07-09 | End: 2020-02-14

## 2019-07-09 RX ORDER — LEVOTHYROXINE SODIUM 75 UG/1
TABLET ORAL
Qty: 90 TABLET | Refills: 1 | OUTPATIENT
Start: 2019-07-09

## 2019-07-09 NOTE — TELEPHONE ENCOUNTER
Was prescribed 50 mcg on 6/18/19, 90 pills, should not be out.  Needs labs rechecked in August to verify dose.

## 2019-07-09 NOTE — TELEPHONE ENCOUNTER
Contacted patient to notify. She states she has enough medications. Request was an error from pharmacy. No further action needed.

## 2019-07-09 NOTE — TELEPHONE ENCOUNTER
"Requested Prescriptions   Pending Prescriptions Disp Refills     levothyroxine (SYNTHROID/LEVOTHROID) 75 MCG tablet [Pharmacy Med Name:  Last Written Prescription Date:  6/18/2019  Last Fill Quantity: 90,  # refills: 0   Last office visit: 6/11/2019 with prescribing provider:  Dr ROHIT Wiseman   Future Office Visit:     LEVOTHYROXINE SODIUM 75 MCG Tablet] 90 tablet 1     Sig: TAKE 1 TABLET EVERY DAY (NEED MD APPOINTMENT)       Thyroid Protocol Failed - 7/8/2019  6:13 PM        Failed - Normal TSH on file in past 12 months     Recent Labs   Lab Test 06/11/19  1323   TSH 0.24*              Passed - Patient is 12 years or older        Passed - Recent (12 mo) or future (30 days) visit within the authorizing provider's specialty     Patient had office visit in the last 12 months or has a visit in the next 30 days with authorizing provider or within the authorizing provider's specialty.  See \"Patient Info\" tab in inbasket, or \"Choose Columns\" in Meds & Orders section of the refill encounter.              Passed - Medication is active on med list        Passed - No active pregnancy on record     If patient is pregnant or has had a positive pregnancy test, please check TSH.          Passed - No positive pregnancy test in past 12 months     If patient is pregnant or has had a positive pregnancy test, please check TSH.          atorvastatin (LIPITOR) 40 MG tablet [Pharmacy Med Name: ATORVASTATIN  Last Written Prescription Date:  12/18/2018  Last Fill Quantity: 90,  # refills: 1   Last office visit: 6/11/2019 with prescribing provider:  Dr ROHIT Wiseman   Future Office Visit:     CALCIUM 40 MG Tablet] 90 tablet 1     Sig: TAKE 1 TABLET EVERY DAY  (NEED  LABS  FOR  ADDITIONAL  REFILLS)       Statins Protocol Passed - 7/8/2019  6:13 PM        Passed - LDL on file in past 12 months     Recent Labs   Lab Test 06/11/19  1323   LDL 67             Passed - No abnormal creatine kinase in past 12 months     No lab results found.             " "Passed - Recent (12 mo) or future (30 days) visit within the authorizing provider's specialty     Patient had office visit in the last 12 months or has a visit in the next 30 days with authorizing provider or within the authorizing provider's specialty.  See \"Patient Info\" tab in inbasket, or \"Choose Columns\" in Meds & Orders section of the refill encounter.              Passed - Medication is active on med list        Passed - Patient is age 18 or older        Passed - No active pregnancy on record        Passed - No positive pregnancy test in past 12 months          "

## 2019-07-09 NOTE — TELEPHONE ENCOUNTER
Routing refill request to provider for review/approval because:  Labs out of range:  TSH    Prescription approved for Atorvastatin per [facility] Refill Protocol.

## 2019-08-02 ENCOUNTER — OFFICE VISIT (OUTPATIENT)
Dept: FAMILY MEDICINE | Facility: CLINIC | Age: 69
End: 2019-08-02
Payer: COMMERCIAL

## 2019-08-02 VITALS
HEART RATE: 65 BPM | WEIGHT: 104 LBS | RESPIRATION RATE: 20 BRPM | TEMPERATURE: 99.2 F | OXYGEN SATURATION: 95 % | DIASTOLIC BLOOD PRESSURE: 80 MMHG | HEIGHT: 62 IN | SYSTOLIC BLOOD PRESSURE: 120 MMHG | BODY MASS INDEX: 19.14 KG/M2

## 2019-08-02 DIAGNOSIS — E44.1 MILD PROTEIN-CALORIE MALNUTRITION (H): ICD-10-CM

## 2019-08-02 DIAGNOSIS — F32.5 MAJOR DEPRESSION IN COMPLETE REMISSION (H): ICD-10-CM

## 2019-08-02 DIAGNOSIS — H65.01 NON-RECURRENT ACUTE SEROUS OTITIS MEDIA OF RIGHT EAR: Primary | ICD-10-CM

## 2019-08-02 PROCEDURE — 99214 OFFICE O/P EST MOD 30 MIN: CPT | Performed by: FAMILY MEDICINE

## 2019-08-02 ASSESSMENT — PATIENT HEALTH QUESTIONNAIRE - PHQ9
SUM OF ALL RESPONSES TO PHQ QUESTIONS 1-9: 2
5. POOR APPETITE OR OVEREATING: NOT AT ALL

## 2019-08-02 ASSESSMENT — ANXIETY QUESTIONNAIRES
7. FEELING AFRAID AS IF SOMETHING AWFUL MIGHT HAPPEN: NOT AT ALL
IF YOU CHECKED OFF ANY PROBLEMS ON THIS QUESTIONNAIRE, HOW DIFFICULT HAVE THESE PROBLEMS MADE IT FOR YOU TO DO YOUR WORK, TAKE CARE OF THINGS AT HOME, OR GET ALONG WITH OTHER PEOPLE: NOT DIFFICULT AT ALL
6. BECOMING EASILY ANNOYED OR IRRITABLE: SEVERAL DAYS
3. WORRYING TOO MUCH ABOUT DIFFERENT THINGS: NOT AT ALL
1. FEELING NERVOUS, ANXIOUS, OR ON EDGE: SEVERAL DAYS
5. BEING SO RESTLESS THAT IT IS HARD TO SIT STILL: NOT AT ALL
GAD7 TOTAL SCORE: 2
2. NOT BEING ABLE TO STOP OR CONTROL WORRYING: NOT AT ALL

## 2019-08-02 ASSESSMENT — MIFFLIN-ST. JEOR: SCORE: 954.99

## 2019-08-02 NOTE — NURSING NOTE
"Chief Complaint   Patient presents with     URI     Recheck Medication     /80   Pulse 65   Temp 99.2  F (37.3  C) (Tympanic)   Resp 20   Ht 1.575 m (5' 2\")   Wt 47.2 kg (104 lb)   LMP  (LMP Unknown)   SpO2 95%   Breastfeeding? No   BMI 19.02 kg/m   Estimated body mass index is 19.02 kg/m  as calculated from the following:    Height as of this encounter: 1.575 m (5' 2\").    Weight as of this encounter: 47.2 kg (104 lb).  BP completed using cuff size: regular   Nadya Cannon CMA    There are no preventive care reminders to display for this patient.  Health Maintenance reviewed at today's visit patient asked to schedule/complete:   None, Health Maintenance up to date.    "

## 2019-08-02 NOTE — PATIENT INSTRUCTIONS
Patient Education     Earache, No Infection (Adult)  Earaches can happen without an infection. This occurs when air and fluid build up behind the eardrum causing a feeling of fullness and discomfort and reduced hearing. This is called otitis media with effusion (OME) or serous otitis media. It means there is fluid in the middle ear. It is not the same as acute otitis media, which is typically from infection.  OME can happen when you have a cold if congestion blocks the passage that drains the middle ear. This passage is called the eustachian tube. OME may also occur with nasal allergies or after a bacterial middle ear infection.    The pain or discomfort may come and go. You may hear clicking or popping sounds when you chew or swallow. You may feel that your balance is off. Or you may hear ringing in the ear.  It often takes from several weeks up to 3 months for the fluid to clear on its own. Oral pain relievers and ear drops help if there is pain. Decongestants and antihistamines sometimes help. Antibiotics don't help since there is no infection. Your doctor may prescribe a nasal spray to help reduce swelling in the nose and eustachian tube. This can allow the ear to drain.  If your OME doesn't improve after 3 months, surgery may be used to drain the fluid and insert a small tube in the eardrum to allow continued drainage.  Because the middle ear fluid can become infected, it is important to watch for signs of an ear infection which may develop later. These signs include increased ear pain, fever, or drainage from the ear.  Home care  The following guidelines will help you care for yourself at home:    You may use over-the-counter medicine as directed to control pain, unless another medicine was prescribed. If you have chronic liver or kidney disease or ever had a stomach ulcer or GI bleeding, talk with your doctor before using these medicines. Aspirin should never be used in anyone under 18 years of age who is  ill with a fever. It may cause severe liver damage.    You may use over-the-counter decongestants such as phenylephrine or pseudoephedrine. But they are not always helpful. Don't use nasal spray decongestants more than 3 days. Longer use can make congestion worse. Prescription nasal sprays from your doctor don't typically have those restrictions.    Antihistamines may help if you are also having allergy symptoms.    You may use medicines such as guaifenesin to thin mucus and promote drainage.  Follow-up care  Follow up with your healthcare provider or as advised if you are not feeling better after 3 days.  When to seek medical advice  Call your healthcare provider right away if any of the following occur:    Your ear pain gets worse or does not start to improve     Fever of 100.4 F (38 C) or higher, or as directed by your healthcare provider    Fluid or blood draining from the ear    Headache or sinus pain    Stiff neck    Unusual drowsiness or confusion  Date Last Reviewed: 10/1/2016    5966-3345 [employer]. [address] [place] [address]. All rights reserved. This information is not intended as a substitute for professional medical care. Always follow your healthcare professional's instructions.         1.  Run a cold air vaporizer as much as possible. If you cannot,  boil water and breath the warm vapors 2-3 times a day to try to open up the sinuses take 2400mgm of guaifenesin per 24 hours   You can do this by taking  Mucinex plain blue  1200 mg  One tablet twice a day (This may come as 600mg/tablet and you need to take 2 tabs twice a day) or you could buy the cheaper  generic 400mgm / tab and take 2 tablets 3 x a day or 1 and 1/2 tablets 4 x a day . .Guaifenesin is  the major component of most cough syrups, because it makes the mucus less thick, and therefore it drains out better and you are less likely to cough from it dripping on the back of your throat.  Irrigate the  nose with plain  water under the kitchen sink faucet or the shower.  [place] pots, spray bottles, etc accumulate bacteria and are not recommended.   The tickle in the throat is also helped by gargling with vinegar and honey mixture, or pop or mouth wash as these coat the throat.  Please try to rinse teeth with water after using these .   Do not use sudafed or pseudephedrine as it dries the mucus up so it is harder to get it out, and it can raise your BP     3. Get ENT appt for future incase you are not better by Ankit in 9-19

## 2019-08-02 NOTE — PROGRESS NOTES
Subjective     Alamshaun Moore is a 69 year old female who presents to clinic today for the following health issues:    HPI     ENT Symptoms             Symptoms: cc Present Absent Comment   Fever/Chills  x     Fatigue   x    Muscle Aches   x    Eye Irritation   x    Sneezing   x    Nasal Jay/Drg   x    Sinus Pressure/Pain   x    Loss of smell   x    Dental pain   x    Sore Throat   x    Swollen Glands   x    Ear Pain/Fullness  x  Right Ear   Cough   x    Wheeze   x    Chest Pain   x    Shortness of breath   x    Rash   x    Other   x      Symptom duration:  x 1 month   Symptom severity:  Severe   Treatments tried:  Hydrogen Peroxide Cleaning   Contacts:  None   Onset 7-6-19 RT ear pain after a flight to [place] and 1 d later had a congested cold   Ear contd to feel full so did the peroxide   Onset pain  10 pm last nite after the peroxide instillation     Glucose Intolerance Follow-Up      Patient is checking blood sugars: not at all    FBS to 110     Diabetic concerns: None     Symptoms of hypoglycemia (low blood sugar): none     Paresthesias (numbness or burning in feet) or sores: No     Date of last diabetic eye exam: 2017    BP Readings from Last 2 Encounters:   05/29/18 120/70   02/15/18 112/80     Hemoglobin A1C (%)   Date Value   06/22/2017 5.4   04/08/2016 5.5     LDL Cholesterol Calculated (mg/dL)   Date Value   12/14/2017 59   06/22/2017 81     Hyperlipidemia:LDL Follow-Up      Rate your low fat/cholesterol diet?: good    Taking statin?  Yes, no muscle aches from 40mgm atorvastatin    Other lipid medications/supplements?:  None    LDL normal     Depression and Anxiety Follow-Up      Status since last visit: No change-in remission     Other associated symptoms:None    Complicating factors:     Significant life event: No     Current substance abuse: None    PHQ-9 6/30/2016 12/14/2017 5/29/2018          8-2-19   Total Score 1 2 0                      2   Q9: Suicide Ideation Not at all Not at all Not at all  "    NOAH-7 SCORE 12/14/2017 5/29/2018   Total Score - 2 (minimal anxiety)              2   Total Score 1 2   PHQ-9  English=3  PHQ-9   Any Language  NOAH-7 =2  Suicide Assessment Five-step Evaluation and Treatment (SAFE-T)          PROTEIN-CALORIE MALNUTRITION      -BMI  Low     - stable     -active pt           Amount of exercise or physical activity: None    Problems taking medications regularly: No    Medication side effects: none    Diet: regular (no restrictions)              Reviewed and updated as needed this visit by Provider  Tobacco  Allergies  Meds  Problems  Med Hx  Surg Hx  Fam Hx         Review of Systems   ROS COMP: CONSTITUTIONAL: NEGATIVE for fever, chills, change in weight  INTEGUMENTARY/SKIN: NEGATIVE for worrisome rashes, moles or lesions  EYES: NEGATIVE for vision changes or irritation  ENT/MOUTH: NEGATIVE for ear, mouth and throat problems  POS Rt ear pain  RESP: NEGATIVE for significant cough or SOB  BREAST: NEGATIVE for masses, tenderness or discharge  CV: NEGATIVE for chest pain, palpitations or peripheral edema  GI: NEGATIVE for nausea, abdominal pain, heartburn, or change in bowel habits  : NEGATIVE for frequency, dysuria, or hematuria  MUSCULOSKELETAL: NEGATIVE for significant arthralgias or myalgia  NEURO: NEGATIVE for weakness, dizziness or paresthesias  ENDOCRINE: NEGATIVE for temperature intolerance, skin/hair changes  HEME: NEGATIVE for bleeding problems  PSYCHIATRIC: NEGATIVE for changes in mood or affect      Objective    /80   Pulse 65   Temp 99.2  F (37.3  C) (Tympanic)   Resp 20   Ht 1.575 m (5' 2\")   Wt 47.2 kg (104 lb)   LMP  (LMP Unknown)   SpO2 95%   Breastfeeding? No   BMI 19.02 kg/m    Body mass index is 19.02 kg/m .  Physical Exam   GENERAL: healthy, alert, no distress and under wt   EYES: Eyes grossly normal to inspection, PERRL and conjunctivae and sclerae normal  HENT: ear canals and TM's normal, nose and mouth without ulcers or lesions  NECK: no " adenopathy, no asymmetry, masses, or scars and thyroid normal to palpation  LYMPH: Negative CCOA nodes and thyroid and inguinal nodes   RESP: lungs clear to auscultation - no rales, rhonchi or wheezes  CV: regular rate and rhythm, normal S1 S2, no S3 or S4, no murmur, click or rub, no peripheral edema and peripheral pulses strong  MS: no gross musculoskeletal defects noted, no edema  SKIN: no suspicious lesions or rashes  NEURO: Normal strength and tone, mentation intact and speech normal  PSYCH: mentation appears normal, affect normal/bright    Diagnostic Test Results:  Labs reviewed in Epic        Assessment & Plan       ICD-10-CM    1. Non-recurrent acute serous otitis media of right ear H65.01    2. Mild protein-calorie malnutrition (H) E44.1    3. Major depression in complete remission (H) on no meds  F32.5           Patient Instructions     Patient Education     Earache, No Infection (Adult)  Earaches can happen without an infection. This occurs when air and fluid build up behind the eardrum causing a feeling of fullness and discomfort and reduced hearing. This is called otitis media with effusion (OME) or serous otitis media. It means there is fluid in the middle ear. It is not the same as acute otitis media, which is typically from infection.  OME can happen when you have a cold if congestion blocks the passage that drains the middle ear. This passage is called the eustachian tube. OME may also occur with nasal allergies or after a bacterial middle ear infection.    The pain or discomfort may come and go. You may hear clicking or popping sounds when you chew or swallow. You may feel that your balance is off. Or you may hear ringing in the ear.  It often takes from several weeks up to 3 months for the fluid to clear on its own. Oral pain relievers and ear drops help if there is pain. Decongestants and antihistamines sometimes help. Antibiotics don't help since there is no infection. Your doctor may prescribe  a nasal spray to help reduce swelling in the nose and eustachian tube. This can allow the ear to drain.  If your OME doesn't improve after 3 months, surgery may be used to drain the fluid and insert a small tube in the eardrum to allow continued drainage.  Because the middle ear fluid can become infected, it is important to watch for signs of an ear infection which may develop later. These signs include increased ear pain, fever, or drainage from the ear.  Home care  The following guidelines will help you care for yourself at home:    You may use over-the-counter medicine as directed to control pain, unless another medicine was prescribed. If you have chronic liver or kidney disease or ever had a stomach ulcer or GI bleeding, talk with your doctor before using these medicines. Aspirin should never be used in anyone under 18 years of age who is ill with a fever. It may cause severe liver damage.    You may use over-the-counter decongestants such as phenylephrine or pseudoephedrine. But they are not always helpful. Don't use nasal spray decongestants more than 3 days. Longer use can make congestion worse. Prescription nasal sprays from your doctor don't typically have those restrictions.    Antihistamines may help if you are also having allergy symptoms.    You may use medicines such as guaifenesin to thin mucus and promote drainage.  Follow-up care  Follow up with your healthcare provider or as advised if you are not feeling better after 3 days.  When to seek medical advice  Call your healthcare provider right away if any of the following occur:    Your ear pain gets worse or does not start to improve     Fever of 100.4 F (38 C) or higher, or as directed by your healthcare provider    Fluid or blood draining from the ear    Headache or sinus pain    Stiff neck    Unusual drowsiness or confusion  Date Last Reviewed: 10/1/2016    8822-5292 [employer]. [address] [place] [address], [place] [address]. All rights  reserved. This information is not intended as a substitute for professional medical care. Always follow your healthcare professional's instructions.         1.  Run a cold air vaporizer as much as possible. If you cannot,  boil water and breath the warm vapors 2-3 times a day to try to open up the sinuses take 2400mgm of guaifenesin per 24 hours   You can do this by taking  Mucinex plain blue  1200 mg  One tablet twice a day (This may come as 600mg/tablet and you need to take 2 tabs twice a day) or you could buy the cheaper  generic 400mgm / tab and take 2 tablets 3 x a day or 1 and 1/2 tablets 4 x a day . .Guaifenesin is  the major component of most cough syrups, because it makes the mucus less thick, and therefore it drains out better and you are less likely to cough from it dripping on the back of your throat.  Irrigate the  nose with plain water under the kitchen sink faucet or the shower.  Ana Maria pots, spray bottles, etc accumulate bacteria and are not recommended.   The tickle in the throat is also helped by gargling with vinegar and honey mixture, or pop or mouth wash as these coat the throat.  Please try to rinse teeth with water after using these .   Do not use sudafed or pseudephedrine as it dries the mucus up so it is harder to get it out, and it can raise your BP     3. Get ENT appt for future incase you are not better by [place] in 9-19    I  Explained the treatment and the reason for it     Return in about 6 months (around 2/2/2020) for cholesterol, depression.    Vale Wiseman MD  [facility]

## 2019-08-03 ASSESSMENT — ANXIETY QUESTIONNAIRES: GAD7 TOTAL SCORE: 2

## 2019-08-06 DIAGNOSIS — E03.9 ACQUIRED HYPOTHYROIDISM: ICD-10-CM

## 2019-08-06 PROCEDURE — 36415 COLL VENOUS BLD VENIPUNCTURE: CPT | Performed by: FAMILY MEDICINE

## 2019-08-06 PROCEDURE — 84443 ASSAY THYROID STIM HORMONE: CPT | Performed by: FAMILY MEDICINE

## 2019-08-07 ENCOUNTER — NURSE TRIAGE (OUTPATIENT)
Dept: FAMILY MEDICINE | Facility: CLINIC | Age: 69
End: 2019-08-07

## 2019-08-07 DIAGNOSIS — E03.9 ACQUIRED HYPOTHYROIDISM: ICD-10-CM

## 2019-08-07 LAB — TSH SERPL DL<=0.005 MIU/L-ACNC: 1.49 MU/L (ref 0.4–4)

## 2019-08-07 RX ORDER — LEVOTHYROXINE SODIUM 50 UG/1
50 TABLET ORAL DAILY
Qty: 90 TABLET | Refills: 3 | Status: CANCELLED | OUTPATIENT
Start: 2019-08-07

## 2019-08-07 NOTE — TELEPHONE ENCOUNTER
"  Additional Information    Negative: Pink or red swelling behind the ear    Negative: Stiff neck (can't touch chin to chest)    Negative: Patient sounds very sick or weak to the triager    Negative: Moving the earlobe or touching the ear clearly increases the pain    Negative: Sounds like a life-threatening emergency to the triager    Negative: Severe earache pain    Negative: Fever > 103 F (39.4 C)    Negative: Pointed object was inserted into the ear canal (e.g., a pencil, stick, or wire)    Negative: White, yellow, or green discharge    Negative: Diabetes mellitus or a weak immune system (e.g., HIV positive, cancer chemotherapy, transplant patient)    Negative: Bloody discharge or unexplained bleeding from ear canal    Negative: New blurred vision or vision changes    Negative: All other earaches (Exceptions: earache lasting < 1 hour, and earache from air travel)    Negative: Patient wants to be seen    Mild earache and ear congestion (fullness) occurring during air travel    Answer Assessment - Initial Assessment Questions  1. LOCATION: \"Which ear is involved?\"      Right ear  2. ONSET: \"When did the ear start hurting\"       07/06/2019  3. SEVERITY: \"How bad is the pain?\"  (Scale 1-10; mild, moderate or severe)    - MILD (1-3): doesn't interfere with normal activities     - MODERATE (4-7): interferes with normal activities or awakens from sleep     - SEVERE (8-10): excruciating pain, unable to do any normal activities       mild  4. URI SYMPTOMS: \" Do you have a runny nose or cough?\"      Mild cough and occasional     5. FEVER: \"Do you have a fever?\" If so, ask: \"What is your temperature, how was it measured, and when did it start?\"      none  6. CAUSE: \"Have you been swimming recently?\", \"How often do you use Q-TIPS?\", \"Have you had any recent air travel or scuba diving?\"      none  7. OTHER SYMPTOMS: \"Do you have any other symptoms?\" (e.g., headache, stiff neck, dizziness, vomiting, runny nose, decreased " "hearing)      none  8. PREGNANCY: \"Is there any chance you are pregnant?\" \"When was your last menstrual period?\"      none    Protocols used: EARACHE-A-OH    "

## 2019-08-07 NOTE — TELEPHONE ENCOUNTER
Patient called to report she tried home care treatments suggested by PCP but she experienced diarrhea with mucinex and stopped taking. She still has a mild ear pain and plans to see ENT Friday.     Pt also notes she had TSH lab checked 08/06/2019 and would like refill with dose she is to continue. Per chart review, TSH lab is normal. Rx order is pended. Please sign if agree with current dose.

## 2019-08-07 NOTE — TELEPHONE ENCOUNTER
Reason for call:  Patient reporting a symptom    Symptom or request: ear pain    Duration (how long have symptoms been present): saw Dr Wiseman  Last week    Have you been treated for this before? Yes    Additional comments: none    Phone Number patient can be reached at:  Home number on file [phone] (home)    Best Time:  any    Can we leave a detailed message on this number:  YES    Call taken on 8/7/2019 at 8:16 AM by RIP HERNANDEZ

## 2019-08-12 ENCOUNTER — OFFICE VISIT (OUTPATIENT)
Dept: URGENT CARE | Facility: URGENT CARE | Age: 69
End: 2019-08-12
Payer: COMMERCIAL

## 2019-08-12 VITALS
SYSTOLIC BLOOD PRESSURE: 118 MMHG | BODY MASS INDEX: 18.66 KG/M2 | DIASTOLIC BLOOD PRESSURE: 64 MMHG | RESPIRATION RATE: 20 BRPM | WEIGHT: 102 LBS | TEMPERATURE: 98 F | OXYGEN SATURATION: 98 % | HEART RATE: 75 BPM

## 2019-08-12 DIAGNOSIS — M54.12 CERVICAL RADICULOPATHY: Primary | ICD-10-CM

## 2019-08-12 PROCEDURE — 99213 OFFICE O/P EST LOW 20 MIN: CPT | Performed by: PHYSICIAN ASSISTANT

## 2019-08-12 RX ORDER — METHYLPREDNISOLONE 4 MG
TABLET, DOSE PACK ORAL
Qty: 21 TABLET | Refills: 0 | Status: SHIPPED | OUTPATIENT
Start: 2019-08-12 | End: 2020-02-14

## 2019-08-12 NOTE — PROGRESS NOTES
SUBJECTIVE:  Chief Complaint   Patient presents with     Urgent Care     Back Pain     back pain and shoulder/hand after workout X1 wk     Alma Moore is a 69 year old female who presents with a chief complaint of left sided upper back pain. Patient reports that approximately 10 days ago she was working out in the gym and developed pain behind her shoulder blade. The pain radiates from her shoulder blade into her hand. She endorses having decreased strength and intermittent tingling into her hand. She did not have direct trauma to the area. Pain is made worse with certain shoulder and arm movements. She denies having fever, chills, chest pain, SOB, facial weakness.      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     Neck pain      Pelvic mass      Rectal pain      Rotator cuff syndrome      Sciatica      TMJ (dislocation of temporomandibular joint) left     Urinary frequency      Urinary urgency      Vitamin D deficiencies      Current Outpatient Medications   Medication Sig Dispense Refill     methylPREDNISolone (MEDROL DOSEPAK) 4 MG tablet therapy pack Follow Package Directions 21 tablet 0     aspirin (ASA) 81 MG tablet Take 1 tablet (81 mg) by mouth daily 90 tablet 3     atorvastatin (LIPITOR) 40 MG tablet Take 1 tablet every day 90 tablet 2     EPINEPHrine (EPIPEN 2-LISA) 0.3 MG/0.3ML injection 2-pack Issue a 2 pen packet   INJECT 0.3 MLS INTO THE MUSCLE ONCE AS NEEDED FOR ANAPHYLAXIS 2 mL 5     levothyroxine (SYNTHROID/LEVOTHROID) 50 MCG tablet Take 1 tablet (50 mcg) by mouth daily 90 tablet 0     Omega-3 Fatty Acids (OMEGA-3 FISH OIL PO) Take 1 g by mouth 2 times daily (with meals).         VITAMIN D, CHOLECALCIFEROL, PO Take 1,000 Units by mouth daily.         Social History     Tobacco Use     Smoking status: Never Smoker     Smokeless tobacco: Never Used    Substance Use Topics     Alcohol use: Yes     Alcohol/week: 0.0 oz       ROS:  Review of systems negative except as stated above.    EXAM:   /64   Pulse 75   Temp 98  F (36.7  C)   Resp 20   Wt 46.3 kg (102 lb)   LMP  (LMP Unknown)   SpO2 98%   BMI 18.66 kg/m    M/S Exam: L upper back has TTP over shoulder blade. No bruising is noted. She has FROM in her shoulder, elbow and wrist. She does not have spinous process tenderness.    GENERAL APPEARANCE: healthy, alert and no distress  EXTREMITIES: peripheral pulses normal  SKIN: no suspicious lesions or rashes  NEURO: Normal strength and tone, sensory exam grossly normal, mentation intact and speech normal    X-RAY was not done.    ASSESSMENT / PLAN:  1. Cervical radiculopathy  TTP over shoulderblade with radicular symptoms into left arm. I encouraged stretching, heat and ice and using med as prescribed. She does have a history of ovarian cancer, but is without systemic symptoms (fever, chills, weight loss, spinal column pain)  Encouraged her to follow-up with ortho for re-check if symptoms fail to improve.   - methylPREDNISolone (MEDROL DOSEPAK) 4 MG tablet therapy pack; Follow Package Directions  Dispense: 21 tablet; Refill: 0  - ORTHO  REFERRAL    Diagnosis and treatment plan was reviewed with patient and/or family.   Patient verbalizes understanding. All questions were addressed and answered.   Teressa Espinal PA-C

## 2019-08-14 ENCOUNTER — ANCILLARY PROCEDURE (OUTPATIENT)
Dept: GENERAL RADIOLOGY | Facility: CLINIC | Age: 69
End: 2019-08-14
Attending: FAMILY MEDICINE
Payer: COMMERCIAL

## 2019-08-14 ENCOUNTER — OFFICE VISIT (OUTPATIENT)
Dept: ORTHOPEDICS | Facility: CLINIC | Age: 69
End: 2019-08-14
Payer: COMMERCIAL

## 2019-08-14 VITALS — SYSTOLIC BLOOD PRESSURE: 116 MMHG | DIASTOLIC BLOOD PRESSURE: 68 MMHG

## 2019-08-14 DIAGNOSIS — M54.12 CERVICAL RADICULOPATHY: ICD-10-CM

## 2019-08-14 DIAGNOSIS — M50.30 DDD (DEGENERATIVE DISC DISEASE), CERVICAL: Primary | ICD-10-CM

## 2019-08-14 PROCEDURE — 99204 OFFICE O/P NEW MOD 45 MIN: CPT | Performed by: FAMILY MEDICINE

## 2019-08-14 PROCEDURE — 72040 X-RAY EXAM NECK SPINE 2-3 VW: CPT

## 2019-08-14 RX ORDER — CYCLOBENZAPRINE HCL 5 MG
5 TABLET ORAL 2 TIMES DAILY PRN
Qty: 60 TABLET | Refills: 0 | Status: SHIPPED | OUTPATIENT
Start: 2019-08-14 | End: 2020-07-16

## 2019-08-14 NOTE — LETTER
8/14/2019         RE: Alma Moore  [address] [place] [address]        Dear Colleague,    Thank you for referring your patient, Alma Moore, to the [facility]. Please see a copy of my visit note below.    ASSESSMENT & PLAN  Patient Instructions     1. DDD (degenerative disc disease), cervical    2. Cervical radiculopathy      -She has a cervical radiculopathy due to multilevel cervical arthritis and degenerative disc disease.  -Patient will start formal physical therapy and home exercise program.  -Patient was unable to tolerate a steroid taper and so we will avoid that at this time.  Patient will start Flexeril an hour or 2 before bedtime.  -Patient will follow-up in 2 weeks for reevaluation and progression of activity.  Patient has a trip planned in 3 weeks.  -Call direct clinic number [[phone]] at any time with questions or concerns.    Albert Yeo MD [facility]            -----    SUBJECTIVE  Alma Moore is a/an 69 year old Right handed female who is seen in consultation at the request of  Teressa Espinal PA-C for evaluation of left posterior shoulder pain. The patient is seen by themselves.    Onset: 1 week(s) ago. Patient reports pain began after lifting heavier weights than normal while working out.  Location of Pain: left posterior shoulder with pain radiating down left arm  Rating of Pain at worst: 10/10  Rating of Pain Currently: 3/10  Worsened by: laying on back, turning neck side to side, lifting with left arm, carrying bag on left shoulder  Better with: rest/activity avoidance  Treatments tried: rest/activity avoidance, ice, heat, Tylenol and ibuprofen, other medication: Medrol DosePak  Quality: dull, sharp, burning  Associated symptoms: numbness, tingling and weakness of left hand  Orthopedic history: YES - h/o cervicalgia Date: 2008  Relevant surgical history:  NO  Social history: social history: retired    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     Neck pain      Pelvic mass      Rectal pain      Rotator cuff syndrome      Sciatica      TMJ (dislocation of temporomandibular joint) left     Urinary frequency      Urinary urgency      Vitamin D deficiencies      Social History     Socioeconomic History     Marital status: Single     Spouse name: Not on file     Number of children: Not on file     Years of education: Not on file     Highest education level: Not on file   Occupational History     Not on file   Social Needs     Financial resource strain: Not on file     Food insecurity:     Worry: Not on file     Inability: Not on file     Transportation needs:     Medical: Not on file     Non-medical: Not on file   Tobacco Use     Smoking status: Never Smoker     Smokeless tobacco: Never Used   Substance and Sexual Activity     Alcohol use: Yes     Alcohol/week: 0.0 oz     Drug use: No     Sexual activity: Not Currently     Partners: Male   Lifestyle     Physical activity:     Days per week: Not on file     Minutes per session: Not on file     Stress: Not on file   Relationships     Social connections:     Talks on phone: Not on file     Gets together: Not on file     Attends Denominational service: Not on file     Active member of club or organization: Not on file     Attends meetings of clubs or organizations: Not on file     Relationship status: Not on file     Intimate partner violence:     Fear of current or ex partner: Not on file     Emotionally abused: Not on file     Physically abused: Not on file     Forced sexual activity: Not on file   Other Topics Concern     Parent/sibling w/ CABG, MI or angioplasty before 65F 55M? No   Social History Narrative     Not on file         Patient's past medical, surgical, social, and  family histories were reviewed today and no changes are noted.    REVIEW OF SYSTEMS:  10 point ROS is negative other than symptoms noted above in HPI, Past Medical History or as stated below  Constitutional: NEGATIVE for fever, chills, change in weight  Skin: NEGATIVE for worrisome rashes, moles or lesions  GI/: NEGATIVE for bowel or bladder changes  Neuro: NEGATIVE for weakness, dizziness or paresthesias    OBJECTIVE:  /68   LMP  (LMP Unknown)    General: healthy, alert and in no distress  HEENT: no scleral icterus or conjunctival erythema  Skin: no suspicious lesions or rash. No jaundice.  CV: regular rhythm by palpation  Resp: normal respiratory effort without conversational dyspnea   Psych: normal mood and affect  Gait: normal steady gait with appropriate coordination and balance  Neuro: normal light touch sensory exam of the bilateral upper extremities.    MSK:  CERVICAL SPINE  Inspection:    normal cervical lordosis present, rounded shoulders, forward head posture  Palpation:    Tender about the cervical spinous processes and paracervical musculature (left). Otherwise remainder of the landmarks and nontender.  Range of Motion:     Flexion limited slightly by pain limited by tightness    Extension within normal limits    Right side bend within normal limits    Left side bend limited slightly by pain limited by tightness    Right rotation within normal limits    Left rotation limited slightly by pain limited by tightness  Strength:    Full strength throughout all neck muscles  Special Tests:    Positive: None    Negative: Spurling's (bilateral), Lizarraga's (bilateral)        Independent visualization of the below image:  No results found for this or any previous visit (from the past 24 hour(s)).    Unofficial review of x-ray taken in office today shows multilevel degenerative disc disease and spondylosis.  No acute fracture, listhesis or scoliosis.      Albert Yeo MD [facility]      Again, thank you for allowing me to participate in the care of your patient.        Sincerely,        Albert Yeo, MD

## 2019-08-14 NOTE — PROGRESS NOTES
ASSESSMENT & PLAN  Patient Instructions     1. DDD (degenerative disc disease), cervical    2. Cervical radiculopathy      -She has a cervical radiculopathy due to multilevel cervical arthritis and degenerative disc disease.  -Patient will start formal physical therapy and home exercise program.  -Patient was unable to tolerate a steroid taper and so we will avoid that at this time.  Patient will start Flexeril an hour or 2 before bedtime.  -Patient will follow-up in 2 weeks for reevaluation and progression of activity.  Patient has a trip planned in 3 weeks.  -Call direct clinic number [[phone]] at any time with questions or concerns.    Albert Yeo MD [facility]  [facility]          -----    SUBJECTIVE  Alma Moore is a/an 69 year old Right handed female who is seen in consultation at the request of  Teressa Espinal PA-C for evaluation of left posterior shoulder pain. The patient is seen by themselves.    Onset: 1 week(s) ago. Patient reports pain began after lifting heavier weights than normal while working out.  Location of Pain: left posterior shoulder with pain radiating down left arm  Rating of Pain at worst: 10/10  Rating of Pain Currently: 3/10  Worsened by: laying on back, turning neck side to side, lifting with left arm, carrying bag on left shoulder  Better with: rest/activity avoidance  Treatments tried: rest/activity avoidance, ice, heat, Tylenol and ibuprofen, other medication: Medrol DosePak  Quality: dull, sharp, burning  Associated symptoms: numbness, tingling and weakness of left hand  Orthopedic history: YES - h/o cervicalgia Date: 2008  Relevant surgical history: NO  Social history: social history: retired    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     Neck pain      Pelvic  mass      Rectal pain      Rotator cuff syndrome      Sciatica      TMJ (dislocation of temporomandibular joint) left     Urinary frequency      Urinary urgency      Vitamin D deficiencies      Social History     Socioeconomic History     Marital status: Single     Spouse name: Not on file     Number of children: Not on file     Years of education: Not on file     Highest education level: Not on file   Occupational History     Not on file   Social Needs     Financial resource strain: Not on file     Food insecurity:     Worry: Not on file     Inability: Not on file     Transportation needs:     Medical: Not on file     Non-medical: Not on file   Tobacco Use     Smoking status: Never Smoker     Smokeless tobacco: Never Used   Substance and Sexual Activity     Alcohol use: Yes     Alcohol/week: 0.0 oz     Drug use: No     Sexual activity: Not Currently     Partners: Male   Lifestyle     Physical activity:     Days per week: Not on file     Minutes per session: Not on file     Stress: Not on file   Relationships     Social connections:     Talks on phone: Not on file     Gets together: Not on file     Attends [employer] service: Not on file     Active member of club or organization: Not on file     Attends meetings of clubs or organizations: Not on file     Relationship status: Not on file     Intimate partner violence:     Fear of current or ex partner: Not on file     Emotionally abused: Not on file     Physically abused: Not on file     Forced sexual activity: Not on file   Other Topics Concern     Parent/sibling w/ CABG, MI or angioplasty before 65F 55M? No   Social History Narrative     Not on file         Patient's past medical, surgical, social, and family histories were reviewed today and no changes are noted.    REVIEW OF SYSTEMS:  10 point ROS is negative other than symptoms noted above in HPI, Past Medical History or as stated below  Constitutional: NEGATIVE for fever, chills, change in weight  Skin: NEGATIVE  for worrisome rashes, moles or lesions  GI/: NEGATIVE for bowel or bladder changes  Neuro: NEGATIVE for weakness, dizziness or paresthesias    OBJECTIVE:  /68   LMP  (LMP Unknown)    General: healthy, alert and in no distress  HEENT: no scleral icterus or conjunctival erythema  Skin: no suspicious lesions or rash. No jaundice.  CV: regular rhythm by palpation  Resp: normal respiratory effort without conversational dyspnea   Psych: normal mood and affect  Gait: normal steady gait with appropriate coordination and balance  Neuro: normal light touch sensory exam of the bilateral upper extremities.    MSK:  CERVICAL SPINE  Inspection:    normal cervical lordosis present, rounded shoulders, forward head posture  Palpation:    Tender about the cervical spinous processes and paracervical musculature (left). Otherwise remainder of the landmarks and nontender.  Range of Motion:     Flexion limited slightly by pain limited by tightness    Extension within normal limits    Right side bend within normal limits    Left side bend limited slightly by pain limited by tightness    Right rotation within normal limits    Left rotation limited slightly by pain limited by tightness  Strength:    Full strength throughout all neck muscles  Special Tests:    Positive: None    Negative: Spurling's (bilateral), Lizarraga's (bilateral)        Independent visualization of the below image:  No results found for this or any previous visit (from the past 24 hour(s)).    Unofficial review of x-ray taken in office today shows multilevel degenerative disc disease and spondylosis.  No acute fracture, listhesis or scoliosis.      Albert Yeo MD [facility]

## 2019-08-14 NOTE — PATIENT INSTRUCTIONS
1. DDD (degenerative disc disease), cervical    2. Cervical radiculopathy      -She has a cervical radiculopathy due to multilevel cervical arthritis and degenerative disc disease.  -Patient will start formal physical therapy and home exercise program.  -Patient was unable to tolerate a steroid taper and so we will avoid that at this time.  Patient will start Flexeril an hour or 2 before bedtime.  -Patient will follow-up in 2 weeks for reevaluation and progression of activity.  Patient has a trip planned in 3 weeks.  -Call direct clinic number [[phone]] at any time with questions or concerns.    Albert Yeo MD [facility]  [facility] [place]

## 2019-08-15 ENCOUNTER — THERAPY VISIT (OUTPATIENT)
Dept: PHYSICAL THERAPY | Facility: CLINIC | Age: 69
End: 2019-08-15
Attending: FAMILY MEDICINE
Payer: COMMERCIAL

## 2019-08-15 DIAGNOSIS — M54.2 CERVICAL PAIN: ICD-10-CM

## 2019-08-15 DIAGNOSIS — M50.30 DDD (DEGENERATIVE DISC DISEASE), CERVICAL: ICD-10-CM

## 2019-08-15 DIAGNOSIS — M54.12 CERVICAL RADICULOPATHY: ICD-10-CM

## 2019-08-15 PROCEDURE — 97161 PT EVAL LOW COMPLEX 20 MIN: CPT | Mod: GP | Performed by: PHYSICAL THERAPIST

## 2019-08-15 PROCEDURE — 97110 THERAPEUTIC EXERCISES: CPT | Mod: GP | Performed by: PHYSICAL THERAPIST

## 2019-08-15 NOTE — PROGRESS NOTES
[facility] Initial Evaluation  Subjective:  The history is provided by the patient.   Alma Moore being seen for Left shoulder blade pain and neck.   Still radiating into the left arm and fingers. .   Problem began 8/1/2019 (around). Where condition occurred: at home.Problem occurred: After lifting weights and vacuuming.    and reported as 5/10 on pain scale. General health as reported by patient is good. Pertinent medical history includes:  Cancer, concussions/dizziness, menopausal, numbness/tingling, osteoporosis, osteoarthritis and thyroid problems.    Surgeries include:  Cancer surgery and orthopedic surgery.  Current medications:  Muscle relaxants and thyroid medication.   Primary job tasks include:  Prolonged sitting and repetitive tasks.  Pain is described as sharp and aching and is intermittent. Pain is the same all the time. Since onset symptoms are gradually improving.      Patient is Retired.   LIkes to dance, walking, biking, read. .   Barriers include:  None as reported by patient.  Red flags:  None as reported by patient.                      Objective:  System              Cervical/Thoracic Evaluation    Headaches: none  Cervical Myotomes:  normal                                Cord Sign:  normal                                            Farrah Cervical Evaluation    Posture:  Sitting: fair  Standing: fair  Protruding Head: no  Wry Neck: no      Movement Loss:  Protrusion (PRO): min and pain  Flexion (Flex): min and pain  Retraction (RET): min  Extension (EXT): min  Lateral Flexion Right (LF R): nil  Lateral Flexion Left (LF L): nil  Rotation Right (ROT R): min  Rotation Left (ROT L): min and pain  Test Movements:      RET: During: produces  After: no worse  Mechanical Response: no effect  Repeat RET: During: produces  After: no worse  Mechanical Response: IncROM  RET EXT: During: produces  After: no worse  Mechanical Response: no effect  Repeat RET EXT: During: produces   After: no worse  Mechanical Response: no effect                        Conclusion: derangement  Principle of Treatment:      Extension: Retraction                                             ROS    Assessment/Plan:    Patient is a 69 year old female with cervical complaints.    Patient has the following significant findings with corresponding treatment plan.                Diagnosis 1:  Left cervical radiculopathy  Pain -  hot/cold therapy, US, mechanical traction, manual therapy, self management, education, directional preference exercise and home program  Decreased ROM/flexibility - manual therapy, therapeutic exercise and home program  Impaired muscle performance - neuro re-education and home program  Decreased function - therapeutic activities and home program  Impaired posture - neuro re-education and home program    Therapy Evaluation Codes:   Cumulative Therapy Evaluation is: Low complexity.    Previous and current functional limitations:  (See Goal Flow Sheet for this information)    Short term and Long term goals: (See Goal Flow Sheet for this information)     Communication ability:  Patient appears to be able to clearly communicate and understand verbal and written communication and follow directions correctly.  Treatment Explanation - The following has been discussed with the patient:   RX ordered/plan of care  Anticipated outcomes  Possible risks and side effects  This patient would benefit from PT intervention to resume normal activities.   Rehab potential is excellent.    Frequency:  1 X week, once daily  Duration:  for 5 weeks  Discharge Plan:  Achieve all LTG.  Independent in home treatment program.  Reach maximal therapeutic benefit.    Please refer to the daily flowsheet for treatment today, total treatment time and time spent performing 1:1 timed codes.

## 2019-08-21 DIAGNOSIS — E03.9 ACQUIRED HYPOTHYROIDISM: ICD-10-CM

## 2019-08-21 NOTE — TELEPHONE ENCOUNTER
Reason for Call:  Medication or medication refill:    Do you use a [facility]?  Name of the pharmacy and phone number for the current request:  [facility] - [place], [place] - [address]      Name of the medication requested: levothyroxine (SYNTHROID/LEVOTHROID) 50 MCG tablet    Other request: Please send to pharmacy. If any questions please call patient    Can we leave a detailed message on this number? YES    Phone number patient can be reached at: Home number on file [phone] (home)    Best Time: any    Call taken on 8/21/2019 at 1:16 PM by MICHAEL REYES

## 2019-08-21 NOTE — TELEPHONE ENCOUNTER
"Requested Prescriptions   Pending Prescriptions Disp Refills     levothyroxine (SYNTHROID/LEVOTHROID) 50 MCG tablet  Last Written Prescription Date:  6/18/2019  Last Fill Quantity: 90 tablet,  # refills: 0   Last office visit: 8/2/2019 with prescribing provider:  ROHIT Wiseman   Future Office Visit:   Next 5 appointments (look out 90 days)    Aug 28, 2019  3:40 PM CDT  Return Visit with Albert Yeo, MD  [facility] ([facility]) [address] [address] [address]  [phone]          90 tablet 0     Sig: Take 1 tablet (50 mcg) by mouth daily       Thyroid Protocol Passed - 8/21/2019  1:17 PM        Passed - Patient is 12 years or older        Passed - Recent (12 mo) or future (30 days) visit within the authorizing provider's specialty     Patient had office visit in the last 12 months or has a visit in the next 30 days with authorizing provider or within the authorizing provider's specialty.  See \"Patient Info\" tab in inbasket, or \"Choose Columns\" in Meds & Orders section of the refill encounter.              Passed - Medication is active on med list        Passed - Normal TSH on file in past 12 months     Recent Labs   Lab Test 08/06/19  1316   TSH 1.49              Passed - No active pregnancy on record     If patient is pregnant or has had a positive pregnancy test, please check TSH.          Passed - No positive pregnancy test in past 12 months     If patient is pregnant or has had a positive pregnancy test, please check TSH.             "

## 2019-08-22 ENCOUNTER — THERAPY VISIT (OUTPATIENT)
Dept: PHYSICAL THERAPY | Facility: CLINIC | Age: 69
End: 2019-08-22
Attending: FAMILY MEDICINE
Payer: COMMERCIAL

## 2019-08-22 DIAGNOSIS — M54.2 CERVICAL PAIN: ICD-10-CM

## 2019-08-22 PROCEDURE — 97110 THERAPEUTIC EXERCISES: CPT | Mod: GP | Performed by: PHYSICAL THERAPIST

## 2019-08-22 PROCEDURE — 97012 MECHANICAL TRACTION THERAPY: CPT | Mod: GP | Performed by: PHYSICAL THERAPIST

## 2019-08-22 PROCEDURE — 97140 MANUAL THERAPY 1/> REGIONS: CPT | Mod: GP | Performed by: PHYSICAL THERAPIST

## 2019-08-23 RX ORDER — LEVOTHYROXINE SODIUM 50 UG/1
50 TABLET ORAL DAILY
Qty: 90 TABLET | Refills: 3 | Status: SHIPPED | OUTPATIENT
Start: 2019-08-23 | End: 2020-06-01

## 2019-08-28 ENCOUNTER — OFFICE VISIT (OUTPATIENT)
Dept: ORTHOPEDICS | Facility: CLINIC | Age: 69
End: 2019-08-28
Payer: COMMERCIAL

## 2019-08-28 VITALS
BODY MASS INDEX: 18.77 KG/M2 | DIASTOLIC BLOOD PRESSURE: 80 MMHG | WEIGHT: 102 LBS | SYSTOLIC BLOOD PRESSURE: 134 MMHG | HEIGHT: 62 IN

## 2019-08-28 DIAGNOSIS — M50.30 DDD (DEGENERATIVE DISC DISEASE), CERVICAL: Primary | ICD-10-CM

## 2019-08-28 DIAGNOSIS — M54.12 CERVICAL RADICULOPATHY: ICD-10-CM

## 2019-08-28 PROCEDURE — 99214 OFFICE O/P EST MOD 30 MIN: CPT | Performed by: FAMILY MEDICINE

## 2019-08-28 RX ORDER — TRAZODONE HYDROCHLORIDE 50 MG/1
50 TABLET, FILM COATED ORAL AT BEDTIME
Qty: 30 TABLET | Refills: 0 | Status: SHIPPED | OUTPATIENT
Start: 2019-08-28 | End: 2020-02-14

## 2019-08-28 ASSESSMENT — MIFFLIN-ST. JEOR: SCORE: 945.92

## 2019-08-28 NOTE — LETTER
8/28/2019         RE: Alma Moore  [address] [address]        Dear Colleague,    Thank you for referring your patient, Alma Moore, to the [facility]. Please see a copy of my visit note below.    ASSESSMENT & PLAN  Patient Instructions     1. DDD (degenerative disc disease), cervical    2. Cervical radiculopathy      -Patient is here for follow-up of neck pain rating down the arms with numbness and tingling due to degenerative disc disease  -Patient has had mild improvement with physical therapy and Flexeril at night.  -Patient was unable to tolerate oral steroids.  Patient will start nabumetone 750 mg twice a day as needed.  Patient will start trazodone at night.  Patient will stop Flexeril.  Patient will call if medications are not effective or if she has side effects.  -Patient will continue physical therapy and home exercises.    -Patient will follow-up after her trip for reevaluation and progression of activity.  -Patient was offered ordering an MRI and possible epidural steroid injection but she is not interested at this time.  -Call direct clinic number [[phone]] at any time with questions or concerns.    Albert Yeo MD [facility]  [facility]          -----    SUBJECTIVE:  Alma Moore is a 69 year old female who is seen in follow-up for neck pain. They were last seen 8/14/2019.     Since their last visit reports 25% improvement. They indicate that their current pain level is 6/10. Patient reports that pain in posterior shoulder has improved slightly. Patient notes she still has numbness, tingling and weakness of left hand. She notes that it is difficult to lift with left hand or button clothing. They have tried rest/activity avoidance, ibuprofen PM, other medications: Cyclobenzaprine (Flexeril), home exercises and physical therapy (2 visits).      The patient is seen by  "themselves.    Patient's past medical, surgical, social, and family histories were reviewed today and no changes are noted.    REVIEW OF SYSTEMS:  Constitutional: NEGATIVE for fever, chills, change in weight  Skin: NEGATIVE for worrisome rashes, moles or lesions  GI/: NEGATIVE for bowel or bladder changes  Neuro: NEGATIVE for weakness, dizziness or paresthesias    OBJECTIVE:  /80   Ht 1.575 m (5' 2\")   Wt 46.3 kg (102 lb)   LMP  (LMP Unknown)   BMI 18.66 kg/m      General: healthy, alert and in no distress  HEENT: no scleral icterus or conjunctival erythema  Skin: no suspicious lesions or rash. No jaundice.  CV: regular rhythm by palpation, no pedal edema  Resp: normal respiratory effort without conversational dyspnea   Psych: normal mood and affect  Gait: normal steady gait with appropriate coordination and balance  Neuro: normal light touch sensory exam of the extremities.    MSK:  CERVICAL SPINE  Inspection:    normal cervical lordosis present, rounded shoulders, forward head posture  Palpation:    Tender about the cervical spinous processes and paracervical musculature (left). Otherwise remainder of the landmarks and nontender.  Range of Motion:     Flexion  within normal limits    Extension within normal limits    Right side bend within normal limits    Left side bend limited slightly by pain limited by tightness    Right rotation within normal limits    Left rotation limited slightly by pain limited by tightness  Strength:    Full strength throughout all neck muscles  Special Tests:    Positive: None    Negative: Spurling's (bilateral), Lizarraga's (bilateral)        Albert Yeo MD, [facility]          Again, thank you for allowing me to participate in the care of your patient.        Sincerely,        Albert Yeo, MD    "

## 2019-08-28 NOTE — PROGRESS NOTES
ASSESSMENT & PLAN  Patient Instructions     1. DDD (degenerative disc disease), cervical    2. Cervical radiculopathy      -Patient is here for follow-up of neck pain rating down the arms with numbness and tingling due to degenerative disc disease  -Patient has had mild improvement with physical therapy and Flexeril at night.  -Patient was unable to tolerate oral steroids.  Patient will start nabumetone 750 mg twice a day as needed.  Patient will start trazodone at night.  Patient will stop Flexeril.  Patient will call if medications are not effective or if she has side effects.  -Patient will continue physical therapy and home exercises.    -Patient will follow-up after her trip for reevaluation and progression of activity.  -Patient was offered ordering an MRI and possible epidural steroid injection but she is not interested at this time.  -Call direct clinic number [[phone]] at any time with questions or concerns.    Albert Yeo MD [facility]  [facility]          -----    SUBJECTIVE:  Alma Moore is a 69 year old female who is seen in follow-up for neck pain. They were last seen 8/14/2019.     Since their last visit reports 25% improvement. They indicate that their current pain level is 6/10. Patient reports that pain in posterior shoulder has improved slightly. Patient notes she still has numbness, tingling and weakness of left hand. She notes that it is difficult to lift with left hand or button clothing. They have tried rest/activity avoidance, ibuprofen PM, other medications: Cyclobenzaprine (Flexeril), home exercises and physical therapy (2 visits).      The patient is seen by themselves.    Patient's past medical, surgical, social, and family histories were reviewed today and no changes are noted.    REVIEW OF SYSTEMS:  Constitutional: NEGATIVE for fever, chills, change in weight  Skin: NEGATIVE for worrisome rashes, moles or lesions  GI/:  "NEGATIVE for bowel or bladder changes  Neuro: NEGATIVE for weakness, dizziness or paresthesias    OBJECTIVE:  /80   Ht 1.575 m (5' 2\")   Wt 46.3 kg (102 lb)   LMP  (LMP Unknown)   BMI 18.66 kg/m     General: healthy, alert and in no distress  HEENT: no scleral icterus or conjunctival erythema  Skin: no suspicious lesions or rash. No jaundice.  CV: regular rhythm by palpation, no pedal edema  Resp: normal respiratory effort without conversational dyspnea   Psych: normal mood and affect  Gait: normal steady gait with appropriate coordination and balance  Neuro: normal light touch sensory exam of the extremities.    MSK:  CERVICAL SPINE  Inspection:    normal cervical lordosis present, rounded shoulders, forward head posture  Palpation:    Tender about the cervical spinous processes and paracervical musculature (left). Otherwise remainder of the landmarks and nontender.  Range of Motion:     Flexion within normal limits    Extension within normal limits    Right side bend within normal limits    Left side bend limited slightly by pain limited by tightness    Right rotation within normal limits    Left rotation limited slightly by pain limited by tightness  Strength:    Full strength throughout all neck muscles  Special Tests:    Positive: None    Negative: Spurling's (bilateral), Lizarraga's (bilateral)        Albert Yeo MD, [facility]        "

## 2019-08-28 NOTE — PATIENT INSTRUCTIONS
1. DDD (degenerative disc disease), cervical    2. Cervical radiculopathy      -Patient is here for follow-up of neck pain rating down the arms with numbness and tingling due to degenerative disc disease  -Patient has had mild improvement with physical therapy and Flexeril at night.  -Patient was unable to tolerate oral steroids.  Patient will start nabumetone 750 mg twice a day as needed.  Patient will start trazodone at night.  Patient will stop Flexeril.  Patient will call if medications are not effective or if she has side effects.  -Patient will continue physical therapy and home exercises.    -Patient will follow-up after her trip for reevaluation and progression of activity.  -Patient was offered ordering an MRI and possible epidural steroid injection but she is not interested at this time.  -Call direct clinic number [[phone]] at any time with questions or concerns.    Albert Yeo MD CAQSM  [facility]  [facility]

## 2019-08-29 ENCOUNTER — THERAPY VISIT (OUTPATIENT)
Dept: PHYSICAL THERAPY | Facility: CLINIC | Age: 69
End: 2019-08-29
Attending: FAMILY MEDICINE
Payer: COMMERCIAL

## 2019-08-29 DIAGNOSIS — M54.2 CERVICAL PAIN: ICD-10-CM

## 2019-08-29 PROCEDURE — 97140 MANUAL THERAPY 1/> REGIONS: CPT | Mod: GP | Performed by: PHYSICAL THERAPIST

## 2019-08-29 PROCEDURE — 97012 MECHANICAL TRACTION THERAPY: CPT | Mod: GP | Performed by: PHYSICAL THERAPIST

## 2019-08-29 PROCEDURE — 97110 THERAPEUTIC EXERCISES: CPT | Mod: GP | Performed by: PHYSICAL THERAPIST

## 2019-08-30 ENCOUNTER — TELEPHONE (OUTPATIENT)
Dept: ORTHOPEDICS | Facility: CLINIC | Age: 69
End: 2019-08-30

## 2019-08-30 NOTE — TELEPHONE ENCOUNTER
Patient returns call. Discussed her concerns. Informed she does not have to take the Nabumetone if she is not comfortable. Discussed that the pharmacy has to inform patient of potential side effects. Offered to see if Dr. Yeo had another recommendations. She states she will only take if needed, otherwise she will take Tylenol.   Regarding cleaning her garage, she asks if Dr. Yeo really meant that she shouldn't. She lives alone and does not have anyone to clean out the garage for her or able to hire someone. She needs to get it done now before the weather gets bad.    Informed that she would risk possibly making her symptoms worse before her trip. Informed it is up to her but that he was looking out for her best interest.   She was appreciative of assistance.     LANE Butcher RN

## 2019-08-30 NOTE — TELEPHONE ENCOUNTER
"Alma Moore is a 69 year old female who left voicemail asking to speak to Dr. Yeo about the Nabumetone. States \"its feli scary\" and hasn't taken it due to the possible side effects listed regarding heart attack, stroke, blood issues.     2)The trazodone is working much better than the other medication.     3) she needs to clean out her garage before her trip and Dr. Yeo advised against that and asked that she wait when she returns. She wants to go ahead and do it because she returns the end of September and that won't give her a lot of time to get it done.     She asks for a return call.     She can be reached at: [phone]  Ok to leave message : Yes consent on file    Left voicemail asking for patient to return call. Acknowledged that the trazodone is working but would like to discuss her concerns further about the Nabumetone and cleaning the garage.     LANE Butcher RN        "

## 2019-09-04 ENCOUNTER — THERAPY VISIT (OUTPATIENT)
Dept: PHYSICAL THERAPY | Facility: CLINIC | Age: 69
End: 2019-09-04
Payer: COMMERCIAL

## 2019-09-04 DIAGNOSIS — M54.2 CERVICAL PAIN: ICD-10-CM

## 2019-09-04 PROCEDURE — 97012 MECHANICAL TRACTION THERAPY: CPT | Mod: GP | Performed by: PHYSICAL THERAPIST

## 2019-09-04 PROCEDURE — 97140 MANUAL THERAPY 1/> REGIONS: CPT | Mod: GP | Performed by: PHYSICAL THERAPIST

## 2019-09-04 PROCEDURE — 97110 THERAPEUTIC EXERCISES: CPT | Mod: GP | Performed by: PHYSICAL THERAPIST

## 2019-09-06 ENCOUNTER — TELEPHONE (OUTPATIENT)
Dept: ORTHOPEDICS | Facility: CLINIC | Age: 69
End: 2019-09-06

## 2019-09-06 NOTE — TELEPHONE ENCOUNTER
Alma Moore is a 69 year old female who calls with update for Dr. Yeo.  She states the medications he has prescribed really haven't helped her. She took the Trazodone for 4 evenings and she got diarrhea. She stopped it after that. She never took the Nabumetone because she is not having severe pain. She feels the physical therapy is helping her the most and will continue that. She is also going to see the chiropractor when she returns from her 10 day trip. Will give provider the update.     FYI only. No provider action required.    LANE Butcher RN

## 2019-09-30 ENCOUNTER — THERAPY VISIT (OUTPATIENT)
Dept: PHYSICAL THERAPY | Facility: CLINIC | Age: 69
End: 2019-09-30
Payer: COMMERCIAL

## 2019-09-30 DIAGNOSIS — M54.2 CERVICAL PAIN: ICD-10-CM

## 2019-09-30 PROCEDURE — 97110 THERAPEUTIC EXERCISES: CPT | Mod: GP | Performed by: PHYSICAL THERAPIST

## 2019-09-30 PROCEDURE — 97140 MANUAL THERAPY 1/> REGIONS: CPT | Mod: GP | Performed by: PHYSICAL THERAPIST

## 2019-12-11 PROBLEM — M54.2 CERVICAL PAIN: Status: RESOLVED | Noted: 2019-08-15 | Resolved: 2019-12-11

## 2020-01-10 ENCOUNTER — TELEPHONE (OUTPATIENT)
Dept: FAMILY MEDICINE | Facility: CLINIC | Age: 70
End: 2020-01-10

## 2020-01-10 DIAGNOSIS — Z91.89 AT RISK FOR INFECTIOUS DISEASE DUE TO RECENT FOREIGN TRAVEL: ICD-10-CM

## 2020-01-10 RX ORDER — CIPROFLOXACIN 500 MG/1
500 TABLET, FILM COATED ORAL 2 TIMES DAILY
Qty: 6 TABLET | Refills: 0 | Status: SHIPPED | OUTPATIENT
Start: 2020-01-10 | End: 2020-02-14

## 2020-01-10 NOTE — TELEPHONE ENCOUNTER
Reason for Call: Request for an order or referral:    Order or referral being requested: pt leaving for [facility] 1/29/20 states needs antibiotic to take with her for stomach upset    Date needed: as soon as possible    Has the patient been seen by the PCP for this problem? YES    Additional comments: [facility] [place] [facility]    Phone number Patient can be reached at:  Home number on file [phone] (home)    Best Time:      Can we leave a detailed message on this number?  YES    Call taken on 1/10/2020 at 2:36 PM by WILLIS HARDING

## 2020-02-14 ENCOUNTER — OFFICE VISIT (OUTPATIENT)
Dept: FAMILY MEDICINE | Facility: CLINIC | Age: 70
End: 2020-02-14
Payer: COMMERCIAL

## 2020-02-14 VITALS
TEMPERATURE: 98.2 F | RESPIRATION RATE: 12 BRPM | OXYGEN SATURATION: 98 % | SYSTOLIC BLOOD PRESSURE: 110 MMHG | WEIGHT: 104 LBS | BODY MASS INDEX: 19.14 KG/M2 | HEIGHT: 62 IN | DIASTOLIC BLOOD PRESSURE: 60 MMHG | HEART RATE: 74 BPM

## 2020-02-14 DIAGNOSIS — E03.9 ACQUIRED HYPOTHYROIDISM: ICD-10-CM

## 2020-02-14 DIAGNOSIS — F51.01 PRIMARY INSOMNIA: ICD-10-CM

## 2020-02-14 DIAGNOSIS — R26.89 BALANCE PROBLEMS: ICD-10-CM

## 2020-02-14 DIAGNOSIS — S80.01XA CONTUSION OF RIGHT KNEE, INITIAL ENCOUNTER: Primary | ICD-10-CM

## 2020-02-14 DIAGNOSIS — E44.1 MILD PROTEIN-CALORIE MALNUTRITION (H): ICD-10-CM

## 2020-02-14 DIAGNOSIS — R29.6 FALLS FREQUENTLY: ICD-10-CM

## 2020-02-14 DIAGNOSIS — C56.9 MALIGNANT NEOPLASM OF OVARY, UNSPECIFIED LATERALITY (H): ICD-10-CM

## 2020-02-14 DIAGNOSIS — F32.5 MAJOR DEPRESSION IN COMPLETE REMISSION (H): ICD-10-CM

## 2020-02-14 DIAGNOSIS — E78.00 HYPERCHOLESTEROLEMIA: ICD-10-CM

## 2020-02-14 LAB
ALT SERPL W P-5'-P-CCNC: 33 U/L (ref 0–50)
LDLC SERPL DIRECT ASSAY-MCNC: 67 MG/DL
TSH SERPL DL<=0.005 MIU/L-ACNC: 1.61 MU/L (ref 0.4–4)

## 2020-02-14 PROCEDURE — 83721 ASSAY OF BLOOD LIPOPROTEIN: CPT | Performed by: FAMILY MEDICINE

## 2020-02-14 PROCEDURE — 99214 OFFICE O/P EST MOD 30 MIN: CPT | Performed by: FAMILY MEDICINE

## 2020-02-14 PROCEDURE — 84443 ASSAY THYROID STIM HORMONE: CPT | Performed by: FAMILY MEDICINE

## 2020-02-14 PROCEDURE — 36415 COLL VENOUS BLD VENIPUNCTURE: CPT | Performed by: FAMILY MEDICINE

## 2020-02-14 PROCEDURE — 84460 ALANINE AMINO (ALT) (SGPT): CPT | Performed by: FAMILY MEDICINE

## 2020-02-14 RX ORDER — ATORVASTATIN CALCIUM 40 MG/1
TABLET, FILM COATED ORAL
Qty: 90 TABLET | Refills: 2 | Status: SHIPPED | OUTPATIENT
Start: 2020-02-14 | End: 2020-10-16

## 2020-02-14 RX ORDER — TRAZODONE HYDROCHLORIDE 50 MG/1
50 TABLET, FILM COATED ORAL AT BEDTIME
Qty: 90 TABLET | Refills: 0 | Status: SHIPPED | OUTPATIENT
Start: 2020-02-14 | End: 2020-07-16

## 2020-02-14 RX ORDER — TRAZODONE HYDROCHLORIDE 50 MG/1
50 TABLET, FILM COATED ORAL AT BEDTIME
Qty: 30 TABLET | Refills: 0 | Status: SHIPPED | OUTPATIENT
Start: 2020-02-14 | End: 2020-02-14

## 2020-02-14 ASSESSMENT — ANXIETY QUESTIONNAIRES
IF YOU CHECKED OFF ANY PROBLEMS ON THIS QUESTIONNAIRE, HOW DIFFICULT HAVE THESE PROBLEMS MADE IT FOR YOU TO DO YOUR WORK, TAKE CARE OF THINGS AT HOME, OR GET ALONG WITH OTHER PEOPLE: NOT DIFFICULT AT ALL
7. FEELING AFRAID AS IF SOMETHING AWFUL MIGHT HAPPEN: NOT AT ALL
1. FEELING NERVOUS, ANXIOUS, OR ON EDGE: NOT AT ALL
GAD7 TOTAL SCORE: 0
3. WORRYING TOO MUCH ABOUT DIFFERENT THINGS: NOT AT ALL
5. BEING SO RESTLESS THAT IT IS HARD TO SIT STILL: NOT AT ALL
6. BECOMING EASILY ANNOYED OR IRRITABLE: NOT AT ALL
2. NOT BEING ABLE TO STOP OR CONTROL WORRYING: NOT AT ALL

## 2020-02-14 ASSESSMENT — PATIENT HEALTH QUESTIONNAIRE - PHQ9
SUM OF ALL RESPONSES TO PHQ QUESTIONS 1-9: 1
5. POOR APPETITE OR OVEREATING: NOT AT ALL

## 2020-02-14 ASSESSMENT — MIFFLIN-ST. JEOR: SCORE: 954.99

## 2020-02-14 NOTE — LETTER
February 17, 2020      Alma Moore  [address] [facility] [id number]        Dear ,    We are writing to inform you of your test results.      Please see attached lab results   They are all normal     THE FOLLOWING ARE EXPLANATIONS OF SOME OF OUR LAB TESTS     YOU DID NOT NECESSARILY HAVE ALL OF THESE DONE     Hgb is the blood iron level   WBC means White Blood Cells   Platelets are small blood    cells that help with forming the blood clots along with other blood factors.   Electrolytes are Sodium, Potassium, Calcium, Magnesium, Phosphorus.   Liver tests are: AST, ALT, Bilirubin, Alkaline Phosphatase.   Kidney tests are Creatinine, GFR.   HDL Choles   terol - is the good cholesterol and it is good to have it high.   LDL cholesterol is the bad cholesterol and it is good to have it low.   It is recommended to have LDL less than 130 for people with hypertension and to have it less than 100 for people with   heart disease, diabetes and chronic kidney disease.   Triglycerides are another type of lipid that can cause heart disease, like the cholesterol and should be kept low   Thyroid tests are TSH, T4, T3   Glucose is sugar.   A1c is a test that gives us an idea    about how well was controlled the diabetes for the last 3 months.   PSA stands for Prostate Specific Antigen and it can be elevated with prostate cancer or prostate inflammation.     Please continue on the same medications  Resulted Orders   LDL cholesterol direct   Result Value Ref Range    LDL Cholesterol Direct 67 <100 mg/dL      Comment:      Desirable:       <100 mg/dl   ALT   Result Value Ref Range    ALT 33 0 - 50 U/L   TSH with free T4 reflex   Result Value Ref Range    TSH 1.61 0.40 - 4.00 mU/L       If you have any questions or concerns, please call the clinic at the number listed above.       Sincerely,        Vale Wiseman MD

## 2020-02-14 NOTE — PATIENT INSTRUCTIONS
1.  Stop the aspirin as the risk of taking it as it may cause  Bleeding ... is higher than the risk of heart attack for you as you have no history of stroke , heart attack or other vascular disease   Please do take the 81mgm aspirin once every 24 hrs on long air or car trips     2. Warm soaks 10min 1-2 x a day and press dry     3. Keep the injured area above the level of the heart as much as possible to help decrease the pain and  the healing time . Put pillows on either side while sleeping to keep the arm or leg elevated     4. Watch the red area --come back if it gets bigger     5.only cover if need and only with cotton or breathable     6. For the insomnia , retry trazodone --will help with the anxiety also

## 2020-02-14 NOTE — PROGRESS NOTES
Subjective     Alma PHYLLIS Moore is a 69 year old female who presents to clinic today for the following health issues:    HPI     Musculoskeletal problem/pain:Contusion RT Knee with abrasion and minimal cellulitis      Duration: 02/10/2020    Description  Location: RT Knee    Intensity:  Mild & getting better with down swelling     Accompanying signs and symptoms: Laceration    History  Previous similar problem: no   Previous evaluation:  none    Precipitating or alleviating factors:  Trauma or overuse: YES- Had a Fall  Aggravating factors include: Tender to the Touch    Therapies tried and outcome: Bandage& ice       FREQUENT FALLS AND LOSS OF BALANCE    -SINCE 2013 CHEMO FOR ovarian ca   -used to do [facility] but doesn't now   -has had repeated falls -some with concussion   --see above     BP Readings from Last 2 Encounters:   05/29/18 120/70   02/15/18 112/80     Hemoglobin A1C (%)   Date Value   06/22/2017 5.4   04/08/2016 5.5     LDL Cholesterol Calculated (mg/dL)   Date Value   12/14/2017 59   06/22/2017 81     Hyperlipidemia:LDL Follow-Up      Rate your low fat/cholesterol diet?: good    Taking statin?  Yes, no muscle aches from 40mgm atorvastatin    Other lipid medications/supplements?:  None    LDL normal in 6-19    Depression and Anxiety Follow-Up      Status since last visit: No change-in remission     Other associated symptoms:None    Complicating factors:     Significant life event: No     Current substance abuse: None    PHQ-9 6/30/2016 12/14/2017 5/29/2018 8-2-19  2-14-20      Total Score 1 2 0                      2         1        Q9: Suicide Ideation Not at all Not at all Not at all     NOAH-7 SCORE 12/14/2017 5/29/2018   Total Score - 2 (minimal anxiety)              2      0   Total Score 1 2       PROTEIN-CALORIE MALNUTRITION      -BMI  Low at 19    - stable     -active pt       How many servings of fruits and vegetables do you eat daily?  2-3    On average, how many sweetened beverages do you  drink each day (Examples: soda, juice, sweet tea, etc.  Do NOT count diet or artificially sweetened beverages)?   1    How many days per week do you exercise enough to make your heart beat faster? 4    How many minutes a day do you exercise enough to make your heart beat faster? 30 - 60    How many days per week do you miss taking your medication? 0    Insomnia      Duration: lifelong    Thinks worse since 2-10-20 fall     Description  Frequency of insomnia:  nightly  Time to fall asleep: 45 minutes  Middle of night awakening:  nightly  Early morning awakening:  nightly    Accompanying signs and symptoms:  pain and anxiety    History  Similar episodes in past:  YES  Previous evaluation/sleep study:  no     Precipitating or alleviating factors:  New stressful situation: YES-knee injury   Caffeine intake after lunchtime: no  But did in [place] for 2 weeks --home 2-11-20  OTC decongestants: no   Any new medications: no     Therapies tried and outcome: none    Has an Rx for trazodone 50mgm       [facility]     - 2013   -Rx chemo and resex   -in remission    Hypothyroidism Follow-up      Since last visit, patient describes the following symptoms: Weight stable, no hair loss, no skin changes, no constipation, no loose stools    On levothyroxine 50 mcg     Was hyper in 6-19 when dose was lowered             Reviewed and updated as needed this visit by Provider  Tobacco  Allergies  Meds  Problems  Med Hx  Surg Hx  Fam Hx         Review of Systems   ROS COMP: CONSTITUTIONAL: NEGATIVE for fever, chills, change in weight  INTEGUMENTARY/SKIN: NEGATIVE for worrisome rashes, moles or lesions  EYES: NEGATIVE for vision changes or irritation  ENT/MOUTH: NEGATIVE for ear, mouth and throat problems  RESP: NEGATIVE for significant cough or SOB  BREAST: NEGATIVE for masses, tenderness or discharge  CV: NEGATIVE for chest pain, palpitations or peripheral edema  GI: NEGATIVE for nausea, abdominal pain, heartburn, or change in  "bowel habits  : NEGATIVE for frequency, dysuria, or hematuria  MUSCULOSKELETAL:POSITIVE  for , joint stiffness , joint swelling  and joint warmth RT knee    NEURO: NEGATIVE for weakness, dizziness or paresthesias  ENDOCRINE: NEGATIVE for temperature intolerance, skin/hair changes  HEME: NEGATIVE for bleeding problems  PSYCHIATRIC: NEGATIVE for changes in mood or affect      Objective    /60   Pulse 74   Temp 98.2  F (36.8  C) (Tympanic)   Resp 12   Ht 1.575 m (5' 2\")   Wt 47.2 kg (104 lb)   LMP  (LMP Unknown)   SpO2 98%   Breastfeeding No   BMI 19.02 kg/m    Body mass index is 19.02 kg/m .  Physical Exam   GENERAL: healthy, alert and no distress  EYES: Eyes grossly normal to inspection, PERRL and conjunctivae and sclerae normal  RESP: lungs clear to auscultation - no rales, rhonchi or wheezes  CV: regular rate and rhythm, normal S1 S2, no S3 or S4, no murmur, click or rub, no peripheral edema and peripheral pulses strong  MS: no gross musculoskeletal defects noted, no edema POS slight prepatellar swelling and tender Rt knee with yellow -blue skin 4 cm around it and central pink x 3cm diam with central eschar 2x4mm ;FROM   SKIN: no suspicious lesions or rashes  NEURO: Normal strength and tone, mentation intact and speech normal  PSYCH: mentation appears normal, affect normal/bright    Diagnostic Test Results:  Labs reviewed in Epic        Assessment & Plan       ICD-10-CM    1. Contusion of right knee, initial encounter- on carpet on2-10-20  S80.01XA    2. Primary insomnia F51.01    3. Malignant neoplasm of ovary, unspecified laterality (H) C56.9    4. Mild protein-calorie malnutrition (H) E44.1    5. Major depression in complete remission (H) F32.5    6. Falls frequently causing recurrent head injuries  R29.6    7. Balance problems from chemo 2013  R26.89    8. Acquired hypothyroidism E03.9 TSH with free T4 reflex   9. Hypercholesterolemia E78.00 LDL cholesterol direct     ALT     atorvastatin " (LIPITOR) 40 MG tablet          Patient Instructions   1.  Stop the aspirin as the risk of taking it as it may cause  Bleeding ... is higher than the risk of heart attack for you as you have no history of stroke , heart attack or other vascular disease   Please do take the 81mgm aspirin once every 24 hrs on long air or car trips     2. Warm soaks 10min 1-2 x a day and press dry     3. Keep the injured area above the level of the heart as much as possible to help decrease the pain and  the healing time . Put pillows on either side while sleeping to keep the arm or leg elevated     4. Watch the red area --come back if it gets bigger     5.only cover if need and only with cotton or breathable       Return in about 6 months (around 8/14/2020) for Physical Exam, depression, thyroid.    Vale Wiseman MD  [facility]    Weight management plan noted, stable and monitoring    Vale Wiseman MD

## 2020-02-15 ASSESSMENT — ANXIETY QUESTIONNAIRES: GAD7 TOTAL SCORE: 0

## 2020-02-18 ENCOUNTER — MYC MEDICAL ADVICE (OUTPATIENT)
Dept: FAMILY MEDICINE | Facility: CLINIC | Age: 70
End: 2020-02-18

## 2020-02-18 ENCOUNTER — NURSE TRIAGE (OUTPATIENT)
Dept: NURSING | Facility: CLINIC | Age: 70
End: 2020-02-18

## 2020-02-18 ENCOUNTER — NURSE TRIAGE (OUTPATIENT)
Dept: FAMILY MEDICINE | Facility: CLINIC | Age: 70
End: 2020-02-18

## 2020-02-18 NOTE — TELEPHONE ENCOUNTER
1. We do not even know if she has influenza     2. zofluza is usually quite expensive and none of these antivirals work very well   They decrease the course by 1-3 days and can cause N/V and other side effects     3. They need to be started within 48 hrs of onset of symptoms to have even this minimal effect

## 2020-02-18 NOTE — TELEPHONE ENCOUNTER
"Pt calling into clinics with sx.    Of note: pt recently traveled back from [facility] on 02/11. States she got sick down in [place], she had diarrhea and nausea. No blood in diarrhea. States she felt better after that night, and did not have any sx between coming home and yesterday. States after getting home, she went to multiple locations with many people and feels like she may have picked up influenza.    States she is not interested in tamiflu, but wondering what PCP thinks about xofluza.     -HC and chills starting yesterday  -Afebrile  -Nausea, bloating, increased stools  -Dizziness   -Pt DID get flu shot around 09/19. Not eligible for influenza protocol due to this.    RN offered pt appt, pt states she does not feel \"up to going outside today\". Above age for e-visit.  At this time RN advised pt to take OTC medication for HA and increased fluids and rest.    Please advise. Since pt has recently traveled, is there more concern than influenza? If not and pt declining to come into clinic, any additional advice?    Influenza-Like Illness (JUAN LUIS) Protocol    Alma PHYLLIS Moore      Age: 69 year old     YOB: 1950    Please select the type of triage protocol you used for this patient? Epic Uziel and Mauro or another form of electronic triage protocol was used.     Influenza-Like Illness (JUAN LUIS) Standing Order    Has the patient been seen by a primary care provider at an [facility] or [facility] within the past two years? Yes     Do any of the following exclusions apply to the patient?  Does the patient have a history of CrCl less than or equal to 60 ml/min No   Is the patient taking Probenecid No   Was an Intranasal live attenuated influenza vaccine (LAIV) received by the patient 2 weeks before antiviral medication No   Was an LAIV received 48 hours after administration of influenza antiviral drugs, if planning on obtaining? No     Does this " patient have ANY of the above exclusions answered Yes?  No.      This patient is currently sick with JUAN LUIS type symptoms     Does this patient have ANY of the following specialty conditions?  Chemotherapy or radiation within the last 3 months No   Organ or bone marrow transplant No   Metabolic disorder No   HIV patient with CD4 count <200 No     Does this patient have ANY of the above specialty conditions? No     Have the symptoms of JUAN LUIS been present for less than or equal to 48 hours? Yes     Adult Evaluation for Possible Influenza Treatment due to JUAN LUIS Symptoms      Below are conditions which place adults at increased risk for the more severe complications of influenza.    Does this patient have ANY of the following conditions?  Is 65 years of age or older Yes   Chronic pulmonary disease such as asthma or COPD No   Heart disease (CHF, CAD, anticoagulation d/t arrhytmia, congenital heart anomaly) *HTN alone is excluded No   Kidney disease (renal failure, insufficiency or dialysis) No   Hepatic or Hematologic disorder (e.g. chronic liver disease patient, sickle cell disease) No   Diabetes (Type 1 or Type 2) No   Neurologic and Neurodevelopment Conditions (including disorders of the brain, spinal cord, peripheral nerve, and muscle, such as cerebral palsy, epilepsy (seizure disorders), stroke, intellectual disability, moderate to severe developmental delay, muscular dystrophy, or spinal cord injury) No   Obese with BMI >40 No   Immunosuppression caused by medications such as those taking prednisone in excess of 20mg daily, or caused by HIV/AIDS with CD4 count more than 200 No   Is pregnant, may be pregnant, or is within two weeks after delivery No   Is a resident of a chronic care facility No   Is patient  or [place] native No   Is <19 years old and is receiving long term aspirin- or salicylate-containing medications No     Does this patient have ANY of the above conditions?  Yes     Current parent  reported weight:last weight     Wt Readings from Last 1 Encounters:   02/14/20 47.2 kg (104 lb)       Does the patient require a re-weigh?No, the patient is not pediatric and doesn't require parent weight.     The patient qualifies as a patient that may benefit from an order of Tamiflu for treatment of JUAN LUIS per the standing order.    Use SmartSet Standing Order for Influenza Symptom Treatment BID to find suggested Tamiflu order.    Note: if the patient is taking Warfarin (Coumadin), it is okay to order Tamifu if patient has a follow up with INR nurse within 3-5 days of ordering Tamiflu. Assist with scheduling to secure appointment whenever possible.    Have the patient notify their provider of:  - Worsening Symptoms  - Transient Neuropsychiatric events (self-injury and/or delirium)  - Skin Reactions        Additional educational resources include:    http://www.Nanoference    http://www.cdc.gov/flu/  Soila Ferguson RN    Additional Information    Negative: Severe difficulty breathing (e.g., struggling for each breath, speaks in single words)    Negative: Bluish (or gray) lips or face    Negative: Shock suspected (e.g., cold/pale/clammy skin, too weak to stand, low BP, rapid pulse)    Negative: Sounds like a life-threatening emergency to the triager    Negative: Sounds like a cold and there is no fever    Negative: Cough and there is no fever    Negative: Severe cough    Negative: Throat pain and there is no fever    Negative: Severe sore throat    Negative: Influenza vaccine reaction is suspected    Negative: Headache and stiff neck (can't touch chin to chest)    Negative: Chest pain (EXCEPTION: MILD central chest pain, present only when coughing)    Negative: Difficulty breathing that is not severe and not relieved by cleaning out the nose    Negative: Patient sounds very sick or weak to the triager    Negative: Fever > 104 F (40 C)    Negative: Fever > 101 F (38.3 C) and over 60 years of age    Negative: Fever >  "100.0 F (37.8 C) and diabetes mellitus or weak immune system (e.g., HIV positive, cancer chemo, splenectomy, organ transplant, chronic steroids)    Negative: Fever > 100.0 F (37.8 C) and bedridden (e.g., nursing home patient, stroke, chronic illness, recovering from surgery)    HIGH RISK (e.g., age > 64 years, pregnant, HIV+, chronic medical condition) and flu symptoms    Answer Assessment - Initial Assessment Questions  1. WORST SYMPTOM: \"What is your worst symptom?\" (e.g., cough, runny nose, muscle aches, headache, sore throat, fever)       HA and body aches    2. ONSET: \"When did your flu symptoms start?\"       Yesterday    3. COUGH: \"How bad is the cough?\"        No cough    4. RESPIRATORY DISTRESS: \"Describe your breathing.\"       None     5. FEVER: \"Do you have a fever?\" If so, ask: \"What is your temperature, how was it measured, and when did it start?\"      Afebrile    6. EXPOSURE: \"Were you exposed to someone with influenza?\"        Unknow, around a lot of people    7. FLU VACCINE: \"Did you get a flu shot this year?\"      Yes    8. HIGH RISK DISEASE: \"Do you any chronic medical problems?\" (e.g., heart or lung disease, asthma, weak immune system, or other HIGH RISK conditions)      Thyroid issues, cholesterol    9. PREGNANCY: \"Is there any chance you are pregnant?\" \"When was your last menstrual period?\"      N/a    10. OTHER SYMPTOMS: \"Do you have any other symptoms?\"  (e.g., runny nose, muscle aches, headache, sore throat)        HA, muscle aches, loose stools, chills    Protocols used: INFLUENZA - SEASONAL-A-OH      "

## 2020-02-18 NOTE — TELEPHONE ENCOUNTER
"Pt calling into clinics with sx.     Of note: pt recently traveled back from [facility] on 02/11. States she got sick down in [place], she had diarrhea and nausea. No blood in diarrhea. States she felt better after that night, and did not have any sx between coming home and yesterday. States after getting home, she went to multiple locations with many people and feels like she may have picked up influenza.     States she is not interested in tamiflu, but wondering what PCP thinks about xofluza.      -HC and chills starting yesterday  -Afebrile, at 100f  -Nausea, bloating, increased stools  -Dizziness   -Pt DID get flu shot around 09/19. Not eligible for influenza protocol due to this.     RN offered pt appt, pt states she does not feel \"up to going outside today\". Above age for e-visit.  At this time RN advised pt to take OTC medication for HA and increased fluids and rest.     Please advise. Since pt has recently traveled, is there more concern than influenza? If not and pt declining to come into clinic, any additional advice?     "

## 2020-02-18 NOTE — TELEPHONE ENCOUNTER
Pt is calling about the new influenza medication, Xofluza. She had body aches and mild cough and some nausea, starting within the last 24 hours. She is not interested in starting Tamiflu at this time, even though in a high risk population with her age. She will contact the clinic after 8 am today with her request.     Laurence Hebert RN  [facility]  Triage Nurse Advisors

## 2020-02-20 ENCOUNTER — OFFICE VISIT (OUTPATIENT)
Dept: FAMILY MEDICINE | Facility: CLINIC | Age: 70
End: 2020-02-20
Payer: COMMERCIAL

## 2020-02-20 VITALS
RESPIRATION RATE: 14 BRPM | HEART RATE: 92 BPM | HEIGHT: 62 IN | DIASTOLIC BLOOD PRESSURE: 60 MMHG | OXYGEN SATURATION: 97 % | SYSTOLIC BLOOD PRESSURE: 120 MMHG | TEMPERATURE: 99 F | BODY MASS INDEX: 19.14 KG/M2 | WEIGHT: 104 LBS

## 2020-02-20 DIAGNOSIS — S80.01XA CONTUSION OF RIGHT KNEE, INITIAL ENCOUNTER: ICD-10-CM

## 2020-02-20 DIAGNOSIS — R19.7 DIARRHEA OF PRESUMED INFECTIOUS ORIGIN: Primary | ICD-10-CM

## 2020-02-20 PROCEDURE — 99214 OFFICE O/P EST MOD 30 MIN: CPT | Performed by: FAMILY MEDICINE

## 2020-02-20 ASSESSMENT — MIFFLIN-ST. JEOR: SCORE: 954.99

## 2020-02-20 NOTE — PATIENT INSTRUCTIONS
1. To help heal the  High acid causing your heartburn, Please do not eat any acid, including oranges and citric acid fruits, any form of tomato, caffeine in coffee, tea and pop, no NSAIDs including aleve, advil, ibuprofen, and naprosyn NO alcohol. No vitamin C  Which is ascorbic acid   Eat your last food and drink> 5 hrs prior to bedtime  And sleep sitting upright    we may prescribe an acid inhibitor that stops the stomach from producing acid. eg omeprazole   You can take as much as you want of a liquid antacid over the counter --simple take 1-2 Tbsp when needed     NO DAIRY !!!    EAT SOLID  Carbs, chicken broth,bananas     duolingo

## 2020-04-28 DIAGNOSIS — Z91.030 BEE STING ALLERGY: ICD-10-CM

## 2020-04-28 NOTE — TELEPHONE ENCOUNTER
Reason for call:  Medication   If this is a refill request, has the caller requested the refill from the pharmacy already? N/A  Will the patient be using a [facility]? No  Name of the pharmacy and phone number for the current request: [facility]: [phone]    Name of the medication requested: EPINEPHrine (EPIPEN 2-LISA) 0.3 MG/0.3ML injection 2-pack    Other request: please fax to [phone]    Phone number to reach patient:  Home number on file [phone] (home)    Best Time:  anytime    Can we leave a detailed message on this number?  NO    Travel screening: Not Applicable

## 2020-04-29 RX ORDER — EPINEPHRINE 0.3 MG/.3ML
INJECTION SUBCUTANEOUS
Qty: 2 ML | Refills: 5 | Status: SHIPPED | OUTPATIENT
Start: 2020-04-29

## 2020-05-29 ENCOUNTER — TELEPHONE (OUTPATIENT)
Dept: FAMILY MEDICINE | Facility: CLINIC | Age: 70
End: 2020-05-29

## 2020-05-29 DIAGNOSIS — E03.9 ACQUIRED HYPOTHYROIDISM: ICD-10-CM

## 2020-06-01 RX ORDER — LEVOTHYROXINE SODIUM 50 UG/1
TABLET ORAL
Qty: 90 TABLET | Refills: 2 | Status: SHIPPED | OUTPATIENT
Start: 2020-06-01

## 2020-06-01 NOTE — TELEPHONE ENCOUNTER
Alma called to say [facility] delivery pharmacy called her, to say she is to contact the doctors office requesting the Levothyroxine refill to be sent.    Alma says to fax the prescription to: [phone]

## 2020-06-24 ENCOUNTER — ANCILLARY PROCEDURE (OUTPATIENT)
Dept: MAMMOGRAPHY | Facility: CLINIC | Age: 70
End: 2020-06-24
Payer: COMMERCIAL

## 2020-06-24 DIAGNOSIS — Z12.31 VISIT FOR SCREENING MAMMOGRAM: ICD-10-CM

## 2020-06-24 PROCEDURE — 77063 BREAST TOMOSYNTHESIS BI: CPT | Mod: TC

## 2020-06-24 PROCEDURE — 77067 SCR MAMMO BI INCL CAD: CPT | Mod: TC

## 2020-07-06 ENCOUNTER — TELEPHONE (OUTPATIENT)
Dept: FAMILY MEDICINE | Facility: CLINIC | Age: 70
End: 2020-07-06

## 2020-07-06 NOTE — TELEPHONE ENCOUNTER
Normal results read to patient.  No further questions on pt end.  Pt plans to do annual mammo as recommended in comments.    No further action needed at this time.

## 2020-07-06 NOTE — TELEPHONE ENCOUNTER
Reason for Call:  Other call back    Detailed comments: Patient would like mammogram results, please call    Phone Number Patient can be reached at: Home number on file [phone] (home)    Best Time: any time    Can we leave a detailed message on this number? YES    Call taken on 7/6/2020 at 11:58 AM by Kari Dsouza

## 2020-07-16 ENCOUNTER — VIRTUAL VISIT (OUTPATIENT)
Dept: PEDIATRICS | Facility: CLINIC | Age: 70
End: 2020-07-16
Payer: COMMERCIAL

## 2020-07-16 ENCOUNTER — TELEPHONE (OUTPATIENT)
Dept: BEHAVIORAL HEALTH | Facility: CLINIC | Age: 70
End: 2020-07-16

## 2020-07-16 DIAGNOSIS — G47.00 INSOMNIA, UNSPECIFIED TYPE: Primary | ICD-10-CM

## 2020-07-16 PROCEDURE — 99213 OFFICE O/P EST LOW 20 MIN: CPT | Mod: 95 | Performed by: INTERNAL MEDICINE

## 2020-07-16 NOTE — PATIENT INSTRUCTIONS
It sounds like a lot of your difficulty sleeping is related to stress and anxiety. Sometimes we treat this with therapy, daily anxiety medication, or insomnia- specific therapy.     Let's have you meet with Annita, our therapist for a couple of sessions. If this is helpful long-term, or you think that you would like to meet with an insomnia specialist, she can help refer you to these individuals.     If you do need to take a sleep aid at night, don't take Ibuprofen PM. Try Benadryl 12.5mg (1/2 tab) instead as this is the active sleeping ingredient in Ibuprofen PM. You can increase to a full tab if needed, but try not to take every night.

## 2020-07-16 NOTE — PROGRESS NOTES
"Alma Moore is a 70 year old female who is being evaluated via a billable telephone visit.      The patient has been notified of following:     \"This telephone visit will be conducted via a call between you and your physician/provider. We have found that certain health care needs can be provided without the need for a physical exam.  This service lets us provide the care you need with a short phone conversation.  If a prescription is necessary we can send it directly to your pharmacy.  If lab work is needed we can place an order for that and you can then stop by our lab to have the test done at a later time.    Telephone visits are billed at different rates depending on your insurance coverage. During this emergency period, for some insurers they may be billed the same as an in-person visit.  Please reach out to your insurance provider with any questions.    If during the course of the call the physician/provider feels a telephone visit is not appropriate, you will not be charged for this service.\"    Patient has given verbal consent for Telephone visit?  Yes    What phone number would you like to be contacted at? [phone]    How would you like to obtain your AVS? Mail a copy    Subjective     Alma Moore is a 70 year old female who presents via phone visit today for the following health issues:    HPI    Alma calls in to establish care and to discuss difficulties with sleeping.     She reports that if she doesn't do anything at night (taking OTC medications) she will lie wide awake or will only get 5 hours of sleep and be tired during the day.     She notes that she had trouble sleeping about 8 years ago when she was on chemotherapy, but this improved. Mother  3 years ago, struggled to sleep after this for a couple of months.     Now with COVID ongoing, this has been more of a problem for her.     Has been prescribed trazodone, but this didn't work for her and causes side effects. Red wine, " ibuprofen PM helps her to fall asleep. She also has tried CBD oil and sleep rescue, but this doesn't seem to help at all any more. Melatonin used to work, but doesn't help any more.     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      Encounter for long-term current use of medication     Hypercholesterolemia     Osteoporosis     ACP (advance care planning)     Acquired hypothyroidism     Concussion without loss of consciousness, initial encounter 9-17-16     Tension headache since concussion 9-16      Adjustment disorder with anxious mood     Simple phobia     Balance problems from chemo 2013      Falls frequently causing recurrent head injuries      PVC's (premature ventricular contractions)     Protein-calorie malnutrition (H)     Major depression in complete remission (H) on no meds      Rotator cuff injury, left-nondominant , initial encounter 12-16-17     Strain of left upper arm, initial encounter 12-16-17     Calcified nodule from prior trauma Rt dorsum forearm distally      Disturbance of skin sensation--Lt leg BK since 6-18      Vegetarian+fish      Contusion of right knee, initial encounter- on carpet on2-10-20      Primary insomnia     Past Surgical History:   Procedure Laterality Date     CL AFF SURGICAL PATHOLOGY      removed from back     HC TOOTH EXTRACTION W/FORCEP       HYSTERECTOMY TOTAL ABDOMINAL, BILATERAL SALPINGO-OOPHORECTOMY, COMBINED  9/17/2012    Procedure: COMBINED HYSTERECTOMY TOTAL ABDOMINAL, SALPINGO-OOPHORECTOMY;  TOTAL ABDOMINAL HYSTERECTOMY, BILATERAL SALPINGO OOPHORECTOMY ;   Surgeon: Devyn Ha MD;  Location: [facility]     HYSTERECTOMY, PAP NO LONGER INDICATED  09/17/2012     LAPAROTOMY, STAGING, COMBINED  9/17/2012    Procedure: COMBINED LAPAROTOMY, STAGING;;  Surgeon: Devyn Ha MD;  Location: [facility]     ORTHOPEDIC SURGERY  2009     Right hammertoe and bunionectomy       Social History     Tobacco Use     Smoking status: Never Smoker     Smokeless tobacco: Never Used   Substance Use Topics     Alcohol use: Yes     Alcohol/week: 0.0 standard drinks     Family History   Problem Relation Age of Onset     Hypertension Mother      Osteoporosis Mother      Arthritis Mother      Unknown/Adopted Father      Diabetes No family hx of      Coronary Artery Disease No family hx of      Hyperlipidemia No family hx of      Cerebrovascular Disease No family hx of      Breast Cancer No family hx of      Colon Cancer No family hx of      Prostate Cancer No family hx of      Other Cancer No family hx of      Depression No family hx of      Anxiety Disorder No family hx of      Mental Illness No family hx of      Substance Abuse No family hx of      Anesthesia Reaction No family hx of      Asthma No family hx of      Genetic Disorder No family hx of      Thyroid Disease No family hx of      Obesity No family hx of            Reviewed and updated as needed this visit by Provider  Meds         Review of Systems   Constitutional, neuro, psych systems are negative, except as otherwise noted.       Objective   Reported vitals:  LMP  (LMP Unknown)    PSYCH: Alert and oriented times 3; coherent speech, normal   rate and volume, able to articulate logical thoughts, able   to abstract reason, no tangential thoughts, no hallucinations   or delusions  Her affect is   RESP: No cough, no audible wheezing, able to talk in full sentences  Remainder of exam unable to be completed due to telephone visits    Diagnostic Test Results:  Labs reviewed in Epic        Assessment/Plan:    1. Insomnia, unspecified  type  Seems to be triggered mostly by stress. Trazodone unsuccesful in past due to side effects. Discussed that stress and anxiety are common triggers for insomnia. Patient willing to try therapy or CBTi, less interested in daily anxiety medication. Discussed trying to cut back on Ibuprofen PM or if taking an OTC sleep aid just doing diphenhydramine instead of the combination medication.       No follow-ups on file.      Phone call duration:  18 minutes    Adeline Wilkerson MD  Internal Medicine-Pediatrics

## 2020-07-16 NOTE — TELEPHONE ENCOUNTER
Phone Encounter   [facility] [place] attempted to reach patient, by PCP request. LM requesting a returned call and provided call back number. Okay to schedule.    Annita Brown, [facility], [facility] [place]

## 2020-07-16 NOTE — Clinical Note
Miguelito Ariza,   I think this patient would benefit from therapy for her insomnia. She may benefit long term from CBTi too. She's interested in meeting with you for a couple of sessions to see how things go, then maybe doing some CBTi or long term therapy if appropriate. Can you reach out to her? Thanks!  Adeline

## 2020-08-07 ENCOUNTER — VIRTUAL VISIT (OUTPATIENT)
Dept: BEHAVIORAL HEALTH | Facility: CLINIC | Age: 70
End: 2020-08-07
Payer: COMMERCIAL

## 2020-08-07 DIAGNOSIS — F41.9 ANXIETY: Primary | ICD-10-CM

## 2020-08-07 PROCEDURE — 90832 PSYTX W PT 30 MINUTES: CPT | Mod: 95 | Performed by: SOCIAL WORKER

## 2020-08-07 NOTE — PROGRESS NOTES
"[facility] Primary Care: Integrated Behavioral Health  August 7, 2020    Alma Moore is a 70 year old female who is being evaluated via a telephone visit.      The patient has been notified of the following:     \"We have found that certain health care needs can be provided without the need for a face to face visit.  This service lets us provide the care you need with a short phone conversation.      I will have full access to your [facility] medical record during this entire phone call.   I will be taking notes for your medical record.     Since this is like an office visit, we will bill your insurance company for this service.  Please check with your medical insurance if this type of telephone visit/virtual care is covered.  You may be responsible for the cost of this service if insurance coverage is denied.      There are potential benefits and risks of telephone visits (e.g. limits to patient confidentiality) that differ from in-person visits.?  Confidentiality still applies for telephone services, and nobody will record the visit.  It is important to be in a quiet, private space that is free of distractions (including cell phone or other devices) during the visit.??     If during the course of the call I believe a telephone visit is not appropriate, you will not be charged for this service\"    Consent has been obtained for this service by care team member: yes.    Behavioral Health Clinician Progress Note    Patient Name: Alma Moore           Service Type:  Individual      Service Location:   Phone call (patient / identified key support person reached)     Session Start Time: 1:00 pm  Session End Time: 1:25pm      Session Length: 16 - 37      Attendees: Client    Visit Activities (Refresh list every visit): [facility] Only    Diagnostic Assessment Date: Next Session  Treatment Plan Review Date: NA  See Flowsheets for today's PHQ-9 and NOAH-7 results  Previous PHQ-9:   PHQ-9 SCORE 3/15/2019 " 8/2/2019 2/14/2020   PHQ-9 Total Score - - -   PHQ-9 Total Score MyChart - - -   PHQ-9 Total Score 4 2 1     Previous NOAH-7:   NOAH-7 SCORE 3/15/2019 8/2/2019 2/14/2020   Total Score - - -   Total Score 2 2 0       DELONTE LEVEL:  DELONTE Score (Last Two) 2/24/2015   DELONTE Raw Score 43   Activation Score 68.5   DELONTE Level 4       DATA  Extended Session (60+ minutes): No  Interactive Complexity: No  Crisis: No  [facility] Patient: No    Treatment Objective(s) Addressed in This Session:  Target Behavior(s): disease management/lifestyle changes related to stress    Anxiety: will experience a reduction in anxiety, will develop more effective coping skills to manage anxiety symptoms, will develop healthy cognitive patterns and beliefs and will increase ability to function adaptively  Psychological distress related to Sleep Disturbance    Current Stressors / Issues:  [facility] introduced self and role to patient. Patient reported that she has been struggling with insomnia and that she finds herself being tired in the afternoons between 2-5pm and will be tired at bedtime, but then will wake up in the middle of the night for a few hours or up for the rest of the night. Patient reported that she is a very active person, involved in many activities, but with Covid a lot of her activities have been cancelled or changed. She reported that she has never been  and has no children, so she has been experiencing feelings of isolation and loneliness. She reported that her mother passed away 3 years ago, which was very difficult, as they were very close. Patient and [facility] spent session processing through her symptoms and discussing the strategies that she has utilized.     [facility] thinks it would be beneficial to see a therapist trained in CBT-I to address the insomnia, so [facility] will provide bridging services until she gets into that therapist.    Progress on Treatment Objective(s) / Homework:  New Objective established this session - PREPARATION (Decided to  change - considering how); Intervened by negotiating a change plan and determining options / strategies for behavior change, identifying triggers, exploring social supports, and working towards setting a date to begin behavior change    Motivational Interviewing    MI Intervention: Expressed Empathy/Understanding, Supported Autonomy, Collaboration, Evocation, Permission to raise concern or advise, Open-ended questions and Reflections: simple and complex     Change Talk Expressed by the Patient: Desire to change Ability to change Reasons to change Need to change    Provider Response to Change Talk: E - Evoked more info from patient about behavior change, A - Affirmed patient's thoughts, decisions, or attempts at behavior change, R - Reflected patient's change talk and S - Summarized patient's change talk statements    Also provided psychoeducation about behavioral health condition, symptoms, and treatment options    Care Plan review completed: Yes    Medication Review:  No current psychiatric medications prescribed    Medication Compliance:  Yes    Changes in Health Issues:   None reported    Chemical Use Review:   Substance Use: Chemical use reviewed, no active concerns identified      Tobacco Use: No current tobacco use.      Assessment: Current Emotional / Mental Status (status of significant symptoms):  Risk status (Self / Other harm or suicidal ideation)  Patient denies a history of suicidal ideation, suicide attempts, self-injurious behavior, homicidal ideation, homicidal behavior and and other safety concerns  Patient denies current fears or concerns for personal safety.  Patient denies current or recent suicidal ideation or behaviors.  Patient denies current or recent homicidal ideation or behaviors.  Patient denies current or recent self injurious behavior or ideation.  Patient denies other safety concerns.  A safety and risk management plan has not been developed at this time, however patient was encouraged  to call [place] [phone] should there be a change in any of these risk factors.    Appearance:   Unable to gather due to phone visit   Eye Contact:   Unable to gather due to phone visit   Psychomotor Behavior: Unable to gather due to phone visit   Attitude:   Cooperative   Orientation:   All  Speech   Rate / Production: Normal    Volume:  Normal   Mood:    Normal  Affect:    Appropriate   Thought Content:  Clear   Thought Form:  Coherent  Logical   Insight:    Good     Diagnoses:  1. Anxiety        Collateral Reports Completed:  Not Applicable    Plan: (Homework, other):  Patient was given information about behavioral services and encouraged to schedule a follow up appointment with the clinic [facility] as needed.  She was also given information about mental health symptoms and treatment options  and sleep Hygeine recommendations, including a handout with tips and strategies.  CD Recommendations: No indications of CD issues. DA to be completed at next session. PAPA Fong, [facility]      ______________________________________________________________________

## 2020-08-24 ENCOUNTER — VIRTUAL VISIT (OUTPATIENT)
Dept: BEHAVIORAL HEALTH | Facility: CLINIC | Age: 70
End: 2020-08-24
Payer: COMMERCIAL

## 2020-08-24 DIAGNOSIS — F41.9 ANXIETY: Primary | ICD-10-CM

## 2020-08-25 NOTE — PROGRESS NOTES
"[facility] Primary Care: Integrated Behavioral Health  August 24, 2020    Alma Moore is a 70 year old female who is being evaluated via a telephone visit.      The patient has been notified of the following:     \"We have found that certain health care needs can be provided without the need for a face to face visit.  This service lets us provide the care you need with a short phone conversation.      I will have full access to your [facility] medical record during this entire phone call.   I will be taking notes for your medical record.     Since this is like an office visit, we will bill your insurance company for this service.  Please check with your medical insurance if this type of telephone visit/virtual care is covered.  You may be responsible for the cost of this service if insurance coverage is denied.      There are potential benefits and risks of telephone visits (e.g. limits to patient confidentiality) that differ from in-person visits.?  Confidentiality still applies for telephone services, and nobody will record the visit.  It is important to be in a quiet, private space that is free of distractions (including cell phone or other devices) during the visit.??     If during the course of the call I believe a telephone visit is not appropriate, you will not be charged for this service\"    Consent has been obtained for this service by care team member: yes.    Behavioral Health Clinician Progress Note    Patient Name: Alma Moore           Service Type:  Individual      Service Location:   Phone call (patient / identified key support person reached)     Session Start Time: 2:00 pm  Session End Time: 2:13pm      Session Length: 13 minutes     Attendees: Client    Visit Activities (Refresh list every visit): [facility] [place] Only    Diagnostic Assessment Date: Next Session  Treatment Plan Review Date: NA  See Flowsheets for today's PHQ-9 and NOAH-7 results  Previous PHQ-9:   PHQ-9 SCORE 3/15/2019 " 8/2/2019 2/14/2020   PHQ-9 Total Score - - -   PHQ-9 Total Score MyChart - - -   PHQ-9 Total Score 4 2 1     Previous NOAH-7:   NOAH-7 SCORE 3/15/2019 8/2/2019 2/14/2020   Total Score - - -   Total Score 2 2 0       DELONTE LEVEL:  DELONTE Score (Last Two) 2/24/2015   DELONTE Raw Score 43   Activation Score 68.5   DELONTE Level 4       DATA  Extended Session (60+ minutes): No  Interactive Complexity: No  Crisis: No  [facility] Patient: No    Treatment Objective(s) Addressed in This Session:  Target Behavior(s): disease management/lifestyle changes related to stress    Anxiety: will experience a reduction in anxiety, will develop more effective coping skills to manage anxiety symptoms, will develop healthy cognitive patterns and beliefs and will increase ability to function adaptively  Psychological distress related to Sleep Disturbance    Current Stressors / Issues:  Patient reported that she has been doing better and that she is now meeting with a new therapist who is addressing patient's sleep challenges. Patient reported that her sleep has been improving and she has been also doing more regular activities. [facility] and patient spent session processing through her experience with therapist and what she needs moving forward. Patient is open to continuing with other therapist and will reach back out to [facility] if needed.    Progress on Treatment Objective(s) / Homework:  New Objective established this session - PREPARATION (Decided to change - considering how); Intervened by negotiating a change plan and determining options / strategies for behavior change, identifying triggers, exploring social supports, and working towards setting a date to begin behavior change    Motivational Interviewing    MI Intervention: Expressed Empathy/Understanding, Supported Autonomy, Collaboration, Evocation, Permission to raise concern or advise, Open-ended questions and Reflections: simple and complex     Change Talk Expressed by the Patient: Desire to change Ability  to change Reasons to change Need to change    Provider Response to Change Talk: E - Evoked more info from patient about behavior change, A - Affirmed patient's thoughts, decisions, or attempts at behavior change, R - Reflected patient's change talk and S - Summarized patient's change talk statements    Also provided psychoeducation about behavioral health condition, symptoms, and treatment options    Care Plan review completed: Yes    Medication Review:  No current psychiatric medications prescribed    Medication Compliance:  Yes    Changes in Health Issues:   None reported    Chemical Use Review:   Substance Use: Chemical use reviewed, no active concerns identified      Tobacco Use: No current tobacco use.      Assessment: Current Emotional / Mental Status (status of significant symptoms):  Risk status (Self / Other harm or suicidal ideation)  Patient denies a history of suicidal ideation, suicide attempts, self-injurious behavior, homicidal ideation, homicidal behavior and and other safety concerns  Patient denies current fears or concerns for personal safety.  Patient denies current or recent suicidal ideation or behaviors.  Patient denies current or recent homicidal ideation or behaviors.  Patient denies current or recent self injurious behavior or ideation.  Patient denies other safety concerns.  A safety and risk management plan has not been developed at this time, however patient was encouraged to call [place] [phone] should there be a change in any of these risk factors.    Appearance:   Unable to gather due to phone visit   Eye Contact:   Unable to gather due to phone visit   Psychomotor Behavior: Unable to gather due to phone visit   Attitude:   Cooperative   Orientation:   All  Speech   Rate / Production: Normal    Volume:  Normal   Mood:    Normal  Affect:    Appropriate   Thought Content:  Clear   Thought Form:  Coherent  Logical   Insight:    Good     Diagnoses:  1. Anxiety        Collateral  Reports Completed:  Not Applicable    Plan: (Homework, other):  Patient was given information about behavioral services and encouraged to schedule a follow up appointment with the clinic [facility] as needed.  She was also given information about mental health symptoms and treatment options  and sleep Hygeine recommendations, including a handout with tips and strategies.  CD Recommendations: No indications of CD issues. DA to be completed at next session. PAPA Fong, [facility]      ______________________________________________________________________

## 2020-08-31 ENCOUNTER — OFFICE VISIT (OUTPATIENT)
Dept: PEDIATRICS | Facility: CLINIC | Age: 70
End: 2020-08-31
Payer: COMMERCIAL

## 2020-08-31 VITALS
HEART RATE: 67 BPM | TEMPERATURE: 97.3 F | WEIGHT: 101 LBS | BODY MASS INDEX: 18.47 KG/M2 | DIASTOLIC BLOOD PRESSURE: 74 MMHG | OXYGEN SATURATION: 99 % | RESPIRATION RATE: 16 BRPM | SYSTOLIC BLOOD PRESSURE: 112 MMHG

## 2020-08-31 DIAGNOSIS — D22.9 FLESHY SKIN MOLE: Primary | ICD-10-CM

## 2020-08-31 PROCEDURE — 99207 ZZC NO BILLABLE SERVICE THIS VISIT: CPT | Mod: 25 | Performed by: PHYSICIAN ASSISTANT

## 2020-08-31 NOTE — PROGRESS NOTES
Subjective     Alma Moore is a 70 year old female who presents to clinic today for the following health issues:    HPI   Concern - pt states she is here for an overall mole check.       Patient referred to [facility].     Aranza Wahl PA-C

## 2020-08-31 NOTE — PROGRESS NOTES
"Subjective     Almashaun Moore is a 70 year old female who presents to clinic today for the following health issues:    HPI       Rash  Onset/Duration: ***  Description  Location: ***  Character: {RASH DESCRIPT:335130}  Itching: {MILD MOD:343124::\"no\"}  Intensity:  {MILD/MODERATE:296069::\"moderate\"}  Progression of Symptoms:  {.:967465}  Accompanying signs and symptoms:   Fever: {.:936393::\"no\"}  Body aches or joint pain: {.:280520::\"no\"}  Sore throat symptoms: {.:848086::\"no\"}  Recent cold symptoms: {.:971969::\"no\"}  History:           Previous episodes of similar rash: {NONE DEFAULTED:039362::\"None\"}  New exposures:  {ALLERGENS:625150::\"None\"}  Recent travel: {.:808401::\"no\"}  Exposure to similar rash: {.:772137::\"no\"}  Precipitating or alleviating factors: ***  Therapies tried and outcome: {MEDICATIONS FOR RASH:654570::\"none\"}    {additonal problems for provider to add (Optional):959167}    Review of Systems   {ROS COMP (Optional):798481}      Objective    LMP  (LMP Unknown)   There is no height or weight on file to calculate BMI.  Physical Exam   {Exam List (Optional):241624}    {Diagnostic Test Results (Optional):962797}        {PROVIDER CHARTING PREFERENCE:199976}    "

## 2020-10-05 ENCOUNTER — TRANSFERRED RECORDS (OUTPATIENT)
Dept: HEALTH INFORMATION MANAGEMENT | Facility: CLINIC | Age: 70
End: 2020-10-05

## 2020-10-12 ENCOUNTER — TELEPHONE (OUTPATIENT)
Dept: PEDIATRICS | Facility: CLINIC | Age: 70
End: 2020-10-12

## 2020-10-12 NOTE — TELEPHONE ENCOUNTER
Order/Referral Request:    Who is requesting: Patient    Orders being requested: cholesterol & liver function labs    Reason service is needed/diagnosis: patient stated she takes atorvastatin and gets these done usually in late summer. She recently established care here and her previous doctor would have her do these yearly labs.    When are orders needed by: asap    Has this been discussed with Provider: No    Does patient have a preference on a Group/Provider/Facility? [facility]    Does patient have an appointment scheduled: No    Where to send Orders: N/A    Okay to leave detailed message?  Yes at Home number on file [phone] (home)    Mindy Gutiérrez,

## 2020-10-12 NOTE — TELEPHONE ENCOUNTER
Called and spoke with patient.  Explained the recommendation of waiting until February, as labs are only yearly.  Patient verbalized understanding and is scheduled for physical and fasting labs 2/9/2021 at 9:50 am with Dr. Teressa Gu.    Belkis Bauer

## 2020-10-12 NOTE — TELEPHONE ENCOUNTER
MA/TC:    Pt est care with  on 7/16/2020 through a phone visit to discuss about on-going sleep concerns(nothing else was discussed during this visit).      Looks like she had her LDL & ALT(liver test) done on 2/14/2020 through her previous provider & it was within normal limits. These tests are usually done once a year. So, she will be due to recheck her routine fasting labs in Feb of 2021. No future appointment is in place.     Please help pt to schedule an OV for routine px & fasting labs in Feb of 2021. Thanks.    Garett, RN  Triage Nurse

## 2020-10-14 DIAGNOSIS — E78.00 HYPERCHOLESTEROLEMIA: ICD-10-CM

## 2020-10-16 RX ORDER — ATORVASTATIN CALCIUM 40 MG/1
TABLET, FILM COATED ORAL
Qty: 90 TABLET | Refills: 1 | Status: SHIPPED | OUTPATIENT
Start: 2020-10-16 | End: 2021-02-18

## 2021-01-14 ENCOUNTER — HEALTH MAINTENANCE LETTER (OUTPATIENT)
Age: 71
End: 2021-01-14

## 2021-02-09 ENCOUNTER — TELEPHONE (OUTPATIENT)
Dept: PEDIATRICS | Facility: CLINIC | Age: 71
End: 2021-02-09

## 2021-02-09 NOTE — TELEPHONE ENCOUNTER
Reason for Call:  Other Records Release     Detailed comments: pt would like a records release form mailed to her.    Phone Number Patient can be reached at: Home number on file [phone] (home)    Best Time: Anytime    Can we leave a detailed message on this number? YES    Call taken on 2/9/2021 at 1:42 PM by Teressa Menon

## 2021-02-16 ENCOUNTER — RECORDS - HEALTHEAST (OUTPATIENT)
Dept: LAB | Facility: CLINIC | Age: 71
End: 2021-02-16

## 2021-02-16 LAB
ALBUMIN SERPL-MCNC: 3.9 G/DL (ref 3.5–5)
ALP SERPL-CCNC: 65 U/L (ref 45–120)
ALT SERPL W P-5'-P-CCNC: 16 U/L (ref 0–45)
ANION GAP SERPL CALCULATED.3IONS-SCNC: 8 MMOL/L (ref 5–18)
AST SERPL W P-5'-P-CCNC: 21 U/L (ref 0–40)
BILIRUB SERPL-MCNC: 0.5 MG/DL (ref 0–1)
BUN SERPL-MCNC: 18 MG/DL (ref 8–22)
CALCIUM SERPL-MCNC: 9.1 MG/DL (ref 8.5–10.5)
CHLORIDE BLD-SCNC: 99 MMOL/L (ref 98–107)
CHOLEST SERPL-MCNC: 204 MG/DL
CO2 SERPL-SCNC: 25 MMOL/L (ref 22–31)
CREAT SERPL-MCNC: 0.64 MG/DL (ref 0.6–1.1)
FASTING STATUS PATIENT QL REPORTED: ABNORMAL
GFR SERPL CREATININE-BSD FRML MDRD: >60 ML/MIN/1.73M2
GLUCOSE BLD-MCNC: 88 MG/DL (ref 70–125)
HDLC SERPL-MCNC: 93 MG/DL
LDLC SERPL CALC-MCNC: 104 MG/DL
POTASSIUM BLD-SCNC: 3.8 MMOL/L (ref 3.5–5)
PROT SERPL-MCNC: 6.4 G/DL (ref 6–8)
SODIUM SERPL-SCNC: 132 MMOL/L (ref 136–145)
TRIGL SERPL-MCNC: 36 MG/DL
TSH SERPL DL<=0.005 MIU/L-ACNC: 2.3 UIU/ML (ref 0.3–5)

## 2021-02-17 DIAGNOSIS — E78.00 HYPERCHOLESTEROLEMIA: ICD-10-CM

## 2021-02-18 RX ORDER — ATORVASTATIN CALCIUM 40 MG/1
TABLET, FILM COATED ORAL
Qty: 90 TABLET | Refills: 0 | Status: SHIPPED | OUTPATIENT
Start: 2021-02-18 | End: 2021-06-07

## 2021-02-18 NOTE — TELEPHONE ENCOUNTER
Medication is being filled for 1 time refill only due to:  Patient needs to be seen because it has been more than one year since last visit.    Please call patient and assist with scheduling prior to additional refills.    Belgica ORANTES - Registered Nurse  [facility]  Acute and Diagnostic Services

## 2021-02-18 NOTE — TELEPHONE ENCOUNTER
Patient cancelled last Physical on 02/09/21. Patient new insurance is out of network.       Kimmy Benson, CMA

## 2021-03-06 ENCOUNTER — IMMUNIZATION (OUTPATIENT)
Dept: NURSING | Facility: CLINIC | Age: 71
End: 2021-03-06
Payer: COMMERCIAL

## 2021-03-06 PROCEDURE — 0031A PR COVID VAC JANSSEN AD26 0.5ML: CPT

## 2021-03-06 PROCEDURE — 91303 PR COVID VAC JANSSEN AD26 0.5ML: CPT

## 2021-06-01 DIAGNOSIS — E78.00 HYPERCHOLESTEROLEMIA: ICD-10-CM

## 2021-06-04 NOTE — TELEPHONE ENCOUNTER
Routing refill request to provider for review/approval because:  Deborah given x1 and patient did not follow up, please advise    Labs not current.     Ciera Hernandez, RN   [facility] -- Triage Nurse

## 2021-06-07 RX ORDER — ATORVASTATIN CALCIUM 40 MG/1
TABLET, FILM COATED ORAL
Qty: 90 TABLET | Refills: 0 | Status: SHIPPED | OUTPATIENT
Start: 2021-06-07 | End: 2023-12-11

## 2021-06-28 NOTE — MR AVS SNAPSHOT
"              After Visit Summary   2018    Alma Moore    MRN: [id number]           Patient Information     Date Of Birth          1950        Visit Information        Provider Department      2018 10:40 AM Rudy Muniz PT [facility]        Today's Diagnoses     Shoulder pain, left    -  1    Left elbow pain           Follow-ups after your visit        Who to contact     If you have questions or need follow up information about today's clinic visit or your schedule please contact [facility] directly at [phone].  Normal or non-critical lab and imaging results will be communicated to you by VoodooVoxhart, letter or phone within 4 business days after the clinic has received the results. If you do not hear from us within 7 days, please contact the clinic through VoodooVoxhart or phone. If you have a critical or abnormal lab result, we will notify you by phone as soon as possible.  Submit refill requests through [employer] or call your pharmacy and they will forward the refill request to us. Please allow 3 business days for your refill to be completed.          Additional Information About Your Visit        MyChart Information     [employer] lets you send messages to your doctor, view your test results, renew your prescriptions, schedule appointments and more. To sign up, go to [email or web address] . Click on \"Log in\" on the left side of the screen, which will take you to the Welcome page. Then click on \"Sign up Now\" on the right side of the page.     You will be asked to enter the access code listed below, as well as some personal information. Please follow the directions to create your username and password.     Your access code is: [email or web address]  Expires: 3/14/2018 10:45 AM     Your access code will  in 90 days. If you need help or a new code, please call your [place] clinic or [phone].        Care EveryWhere ID     This is your Care " EveryWhere ID. This could be used by other organizations to access your [facility] medical records  [id number]        Your Vitals Were     Last Period                   (LMP Unknown)            Blood Pressure from Last 3 Encounters:   12/28/17 112/74   12/14/17 120/80   06/22/17 116/78    Weight from Last 3 Encounters:   12/28/17 49.4 kg (109 lb)   12/14/17 74.4 kg (164 lb)   06/22/17 45.8 kg (101 lb)              We Performed the Following     HC PT EVAL, LOW COMPLEXITY     ALISHA INITIAL EVAL REPORT     THERAPEUTIC EXERCISES        Primary Care Provider Office Phone # Fax #    Vale Luci Wiseman [phone] [address] [facility] [address]        Equal Access to Services     YUE CARROLL : Hadii judith ku hadasho Soomaali, waaxda luqadaha, qaybta kaalmada adeegyada, waxalecia poon . So [facility] [phone].    ATENCIÓN: Si habla español, tiene a ceballos disposición servicios gratuitos de asistencia lingüística. Llame al [phone].    We comply with applicable federal civil rights laws and [place] laws. We do not discriminate on the basis of race, color, national origin, age, disability, sex, sexual orientation, or gender identity.            Thank you!     Thank you for choosing [facility]  for your care. Our goal is always to provide you with excellent care. Hearing back from our patients is one way we can continue to improve our services. Please take a few minutes to complete the written survey that you may receive in the mail after your visit with us. Thank you!             Your Updated Medication List - Protect others around you: Learn how to safely use, store and throw away your medicines at www.disposemymeds.org.          This list is accurate as of: 1/2/18 11:29 AM.  Always use your most recent med list.                   Brand Name Dispense Instructions for use Diagnosis    aspirin 81 MG tablet     90 tablet    Take 1 tablet  (81 mg) by mouth daily    Routine general medical examination at a health care facility       atorvastatin 40 MG tablet    LIPITOR    90 tablet    TAKE 1 TABLET EVERY DAY    Hypercholesterolemia       EPIPEN 2-LISA 0.3 MG/0.3ML injection 2-pack   Generic drug:  EPINEPHrine     2 each    INJECT 0.3 MLS INTO THE MUSCLE ONCE AS NEEDED FOR ANAPHYLAXIS    Bee sting allergy       levothyroxine 75 MCG tablet    SYNTHROID/LEVOTHROID    90 tablet    TAKE 1 TABLET EVERY DAY (NEED LAB, PLEASE CALL AND MAKE A LAB-ONLY APPOINTMENT)    Acquired hypothyroidism       OMEGA-3 FISH OIL PO      Take 1 g by mouth 2 times daily (with meals).        typhoid CR capsule    VIVOTIF    4 capsule    Take 1 capsule by mouth every other day With the last dose at least 7 days prior to exposure    Travel advice encounter       VITAMIN D (CHOLECALCIFEROL) PO      Take 1,000 Units by mouth daily.           0

## 2021-10-06 ENCOUNTER — TRANSFERRED RECORDS (OUTPATIENT)
Dept: HEALTH INFORMATION MANAGEMENT | Facility: CLINIC | Age: 71
End: 2021-10-06

## 2021-10-19 PROBLEM — F32.9 MAJOR DEPRESSION: Status: ACTIVE | Noted: 2017-12-14

## 2021-10-21 ENCOUNTER — NURSE TRIAGE (OUTPATIENT)
Dept: NURSING | Facility: CLINIC | Age: 71
End: 2021-10-21

## 2021-10-21 NOTE — TELEPHONE ENCOUNTER
Pt calling about Pfizer booster shot as she received J&J for her initial COVID shot.    FNA advised that [place] does not have guidelines set yet and advised to call back within 1-2 weeks for an update.        We are continuing to schedule all people age 12 and older for COVID-19 vaccines (patients age 12-17 can only use the Pfizer vaccine).     We are offering third doses of the Pfizer and Moderna COVID-19 vaccines to moderately and severely immunocompromised patients. Qualified patients can schedule their third dose vaccine appointment via Acceptd or by walking in to one of our retail pharmacies to receive the vaccine - no appointment needed.      Beginning Thursday, Sept. 30, [facility] will begin offering booster doses of the Pfizer-[place] COVID-19 vaccine to the following:     People 65 years and older.     Residents in long-term care settings.     People ages 50 to 64 with certain underlying medical conditions (refer to CDC: People with Certain Medical Conditions).     People ages 18 to 49 who are at high risk for severe COVID-19 due to certain underlying medical conditions (refer to CDC: People with Certain Medical Conditions).      People ages 18 to 64 who are at increased risk for COVID-19 exposure and transmission because of where they live or work.      To schedule your 1st dose appointment, please log in to Accept[employer] using this link to see when and where we have openings.      To schedule your additional dose appointment for immunocompromised patients or patients that qualify for boosters, please log in to Acceptd using this link to see when and where we have openings.     If you have technical difficulty using Acceptd, call [phone], option 1 for assistance.     More information about vaccine effectiveness at reducing spread of disease, hospitalizations, and death as well as vaccine safety and answers to other questions can be found on our website:  [email or web address].     Venessa Kirby RN/[place] Nurse Advisor      Reason for Disposition    COVID-19 vaccine, fully vaccinated, and exposure to COVID-19, Frequently Asked Questions (FAQs)    Protocols used: CORONAVIRUS (COVID-19) VACCINE QUESTIONS AND [id number]-WE 8.25.2021

## 2021-12-09 ENCOUNTER — LAB REQUISITION (OUTPATIENT)
Dept: LAB | Facility: CLINIC | Age: 71
End: 2021-12-09
Payer: COMMERCIAL

## 2021-12-09 DIAGNOSIS — M81.0 AGE-RELATED OSTEOPOROSIS WITHOUT CURRENT PATHOLOGICAL FRACTURE: ICD-10-CM

## 2021-12-09 LAB
ALBUMIN SERPL-MCNC: 3.7 G/DL (ref 3.5–5)
ALP SERPL-CCNC: 34 U/L (ref 45–120)
ALT SERPL W P-5'-P-CCNC: 18 U/L (ref 0–45)
ANION GAP SERPL CALCULATED.3IONS-SCNC: 12 MMOL/L (ref 5–18)
AST SERPL W P-5'-P-CCNC: 25 U/L (ref 0–40)
BILIRUB SERPL-MCNC: 0.5 MG/DL (ref 0–1)
BUN SERPL-MCNC: 19 MG/DL (ref 8–28)
CALCIUM SERPL-MCNC: 9.3 MG/DL (ref 8.5–10.5)
CHLORIDE BLD-SCNC: 99 MMOL/L (ref 98–107)
CO2 SERPL-SCNC: 22 MMOL/L (ref 22–31)
CREAT SERPL-MCNC: 0.67 MG/DL (ref 0.6–1.1)
GFR SERPL CREATININE-BSD FRML MDRD: 89 ML/MIN/1.73M2
GLUCOSE BLD-MCNC: 59 MG/DL (ref 70–125)
POTASSIUM BLD-SCNC: 3.8 MMOL/L (ref 3.5–5)
PROT SERPL-MCNC: 6.5 G/DL (ref 6–8)
SODIUM SERPL-SCNC: 133 MMOL/L (ref 136–145)

## 2021-12-09 PROCEDURE — 80053 COMPREHEN METABOLIC PANEL: CPT | Mod: ORL | Performed by: FAMILY MEDICINE

## 2021-12-09 PROCEDURE — 82306 VITAMIN D 25 HYDROXY: CPT | Mod: ORL | Performed by: FAMILY MEDICINE

## 2021-12-10 LAB — DEPRECATED CALCIDIOL+CALCIFEROL SERPL-MC: 42 UG/L (ref 30–80)

## 2022-02-13 ENCOUNTER — HEALTH MAINTENANCE LETTER (OUTPATIENT)
Age: 72
End: 2022-02-13

## 2022-03-11 ENCOUNTER — LAB REQUISITION (OUTPATIENT)
Dept: LAB | Facility: CLINIC | Age: 72
End: 2022-03-11
Payer: COMMERCIAL

## 2022-03-11 DIAGNOSIS — E03.9 HYPOTHYROIDISM, UNSPECIFIED: ICD-10-CM

## 2022-03-11 LAB — TSH SERPL DL<=0.005 MIU/L-ACNC: 1.93 UIU/ML (ref 0.3–5)

## 2022-03-11 PROCEDURE — 84443 ASSAY THYROID STIM HORMONE: CPT | Mod: ORL | Performed by: PHYSICIAN ASSISTANT

## 2022-06-30 ENCOUNTER — TRANSFERRED RECORDS (OUTPATIENT)
Dept: HEALTH INFORMATION MANAGEMENT | Facility: CLINIC | Age: 72
End: 2022-06-30

## 2022-06-30 ENCOUNTER — MEDICAL CORRESPONDENCE (OUTPATIENT)
Dept: HEALTH INFORMATION MANAGEMENT | Facility: CLINIC | Age: 72
End: 2022-06-30

## 2022-07-05 ENCOUNTER — TRANSCRIBE ORDERS (OUTPATIENT)
Dept: OTHER | Age: 72
End: 2022-07-05

## 2022-07-05 ENCOUNTER — MEDICAL CORRESPONDENCE (OUTPATIENT)
Dept: HEALTH INFORMATION MANAGEMENT | Facility: CLINIC | Age: 72
End: 2022-07-05

## 2022-07-05 DIAGNOSIS — S06.0X0A CONCUSSION WITHOUT LOSS OF CONSCIOUSNESS, INITIAL ENCOUNTER: Primary | ICD-10-CM

## 2022-07-07 ENCOUNTER — TELEPHONE (OUTPATIENT)
Dept: PHYSICAL MEDICINE AND REHAB | Facility: CLINIC | Age: 72
End: 2022-07-07

## 2022-07-07 NOTE — TELEPHONE ENCOUNTER
[facility] Call Center    Phone Message    May a detailed message be left on voicemail: yes     Reason for Call: Other: Patient is returning a call to [facility], patient states she will keep the appt on 9/26/22 and will contact her PCP for recommendations. Patient does not want to wait to be seen.     Action Taken: Message routed to:  Other: PM&R    Travel Screening: Not Applicable

## 2022-07-07 NOTE — TELEPHONE ENCOUNTER
Left detailed message for pt to call back and confirm of the appt with Dr Dacosta on 9/26/22 at 10:15amin [facility]    Thanks,   Maryann

## 2022-10-15 ENCOUNTER — HEALTH MAINTENANCE LETTER (OUTPATIENT)
Age: 72
End: 2022-10-15

## 2023-03-21 ENCOUNTER — LAB REQUISITION (OUTPATIENT)
Dept: LAB | Facility: CLINIC | Age: 73
End: 2023-03-21
Payer: COMMERCIAL

## 2023-03-21 DIAGNOSIS — E78.2 MIXED HYPERLIPIDEMIA: ICD-10-CM

## 2023-03-21 DIAGNOSIS — E03.9 HYPOTHYROIDISM, UNSPECIFIED: ICD-10-CM

## 2023-03-21 LAB
CHOLEST SERPL-MCNC: 207 MG/DL
HDLC SERPL-MCNC: 106 MG/DL
LDLC SERPL CALC-MCNC: 82 MG/DL
NONHDLC SERPL-MCNC: 101 MG/DL
TRIGL SERPL-MCNC: 94 MG/DL
TSH SERPL DL<=0.005 MIU/L-ACNC: 2.4 UIU/ML (ref 0.3–4.2)

## 2023-03-21 PROCEDURE — 80061 LIPID PANEL: CPT | Mod: ORL | Performed by: PHYSICIAN ASSISTANT

## 2023-03-21 PROCEDURE — 84443 ASSAY THYROID STIM HORMONE: CPT | Mod: ORL | Performed by: PHYSICIAN ASSISTANT

## 2023-06-13 ENCOUNTER — TRANSFERRED RECORDS (OUTPATIENT)
Dept: HEALTH INFORMATION MANAGEMENT | Facility: CLINIC | Age: 73
End: 2023-06-13
Payer: COMMERCIAL

## 2023-11-08 ENCOUNTER — LAB REQUISITION (OUTPATIENT)
Dept: LAB | Facility: CLINIC | Age: 73
End: 2023-11-08
Payer: COMMERCIAL

## 2023-11-08 DIAGNOSIS — R19.7 DIARRHEA, UNSPECIFIED: ICD-10-CM

## 2023-11-08 PROCEDURE — 80074 ACUTE HEPATITIS PANEL: CPT | Mod: ORL | Performed by: FAMILY MEDICINE

## 2023-11-08 PROCEDURE — 80053 COMPREHEN METABOLIC PANEL: CPT | Mod: ORL | Performed by: FAMILY MEDICINE

## 2023-11-09 ENCOUNTER — LAB REQUISITION (OUTPATIENT)
Dept: LAB | Facility: CLINIC | Age: 73
End: 2023-11-09
Payer: COMMERCIAL

## 2023-11-09 DIAGNOSIS — R19.7 DIARRHEA, UNSPECIFIED: ICD-10-CM

## 2023-11-09 LAB
ALBUMIN SERPL BCG-MCNC: 4.5 G/DL (ref 3.5–5.2)
ALP SERPL-CCNC: 58 U/L (ref 35–104)
ALT SERPL W P-5'-P-CCNC: 21 U/L (ref 0–50)
ANION GAP SERPL CALCULATED.3IONS-SCNC: 12 MMOL/L (ref 7–15)
AST SERPL W P-5'-P-CCNC: 24 U/L (ref 0–45)
BILIRUB SERPL-MCNC: 0.2 MG/DL
BUN SERPL-MCNC: 18 MG/DL (ref 8–23)
C DIFF TOX B STL QL: NEGATIVE
CALCIUM SERPL-MCNC: 9.4 MG/DL (ref 8.8–10.2)
CHLORIDE SERPL-SCNC: 98 MMOL/L (ref 98–107)
CREAT SERPL-MCNC: 0.56 MG/DL (ref 0.51–0.95)
DEPRECATED HCO3 PLAS-SCNC: 23 MMOL/L (ref 22–29)
EGFRCR SERPLBLD CKD-EPI 2021: >90 ML/MIN/1.73M2
GLUCOSE SERPL-MCNC: 86 MG/DL (ref 70–99)
HAV IGM SERPL QL IA: NONREACTIVE
HBV CORE IGM SERPL QL IA: NONREACTIVE
HBV SURFACE AG SERPL QL IA: NONREACTIVE
HCV AB SERPL QL IA: NONREACTIVE
POTASSIUM SERPL-SCNC: 3.2 MMOL/L (ref 3.4–5.3)
PROT SERPL-MCNC: 6.8 G/DL (ref 6.4–8.3)
SODIUM SERPL-SCNC: 133 MMOL/L (ref 135–145)
WBC STL QL MICRO: NORMAL

## 2023-11-09 PROCEDURE — 89055 LEUKOCYTE ASSESSMENT FECAL: CPT | Mod: ORL | Performed by: FAMILY MEDICINE

## 2023-11-09 PROCEDURE — 87493 C DIFF AMPLIFIED PROBE: CPT | Mod: ORL | Performed by: FAMILY MEDICINE

## 2023-11-09 PROCEDURE — 87507 IADNA-DNA/RNA PROBE TQ 12-25: CPT | Mod: ORL | Performed by: FAMILY MEDICINE

## 2023-11-10 LAB
ADV 40+41 DNA STL QL NAA+NON-PROBE: NEGATIVE
ASTRO TYP 1-8 RNA STL QL NAA+NON-PROBE: NEGATIVE
C CAYETANENSIS DNA STL QL NAA+NON-PROBE: NEGATIVE
CAMPYLOBACTER DNA SPEC NAA+PROBE: NEGATIVE
CRYPTOSP DNA STL QL NAA+NON-PROBE: NEGATIVE
E COLI O157 DNA STL QL NAA+NON-PROBE: NORMAL
E HISTOLYT DNA STL QL NAA+NON-PROBE: NEGATIVE
EAEC ASTA GENE ISLT QL NAA+PROBE: NEGATIVE
EC STX1+STX2 GENES STL QL NAA+NON-PROBE: NEGATIVE
EPEC EAE GENE STL QL NAA+NON-PROBE: NEGATIVE
ETEC LTA+ST1A+ST1B TOX ST NAA+NON-PROBE: NEGATIVE
G LAMBLIA DNA STL QL NAA+NON-PROBE: NEGATIVE
NOROVIRUS GI+II RNA STL QL NAA+NON-PROBE: NEGATIVE
P SHIGELLOIDES DNA STL QL NAA+NON-PROBE: NEGATIVE
RVA RNA STL QL NAA+NON-PROBE: NEGATIVE
SALMONELLA SP RPOD STL QL NAA+PROBE: NEGATIVE
SAPO I+II+IV+V RNA STL QL NAA+NON-PROBE: NEGATIVE
SHIGELLA SP+EIEC IPAH ST NAA+NON-PROBE: NEGATIVE
V CHOLERAE DNA SPEC QL NAA+PROBE: NEGATIVE
VIBRIO DNA SPEC NAA+PROBE: NEGATIVE
Y ENTEROCOL DNA STL QL NAA+PROBE: NEGATIVE

## 2023-11-14 ENCOUNTER — LAB REQUISITION (OUTPATIENT)
Dept: LAB | Facility: CLINIC | Age: 73
End: 2023-11-14
Payer: COMMERCIAL

## 2023-11-14 DIAGNOSIS — E61.1 IRON DEFICIENCY: ICD-10-CM

## 2023-11-14 LAB
IRON BINDING CAPACITY (ROCHE): 361 UG/DL (ref 240–430)
IRON SATN MFR SERPL: 6 % (ref 15–46)
IRON SERPL-MCNC: 22 UG/DL (ref 37–145)
VIT B12 SERPL-MCNC: 1152 PG/ML (ref 232–1245)

## 2023-11-14 PROCEDURE — 82607 VITAMIN B-12: CPT | Mod: ORL | Performed by: FAMILY MEDICINE

## 2023-11-14 PROCEDURE — 83550 IRON BINDING TEST: CPT | Mod: ORL | Performed by: FAMILY MEDICINE

## 2023-11-27 ENCOUNTER — TRANSFERRED RECORDS (OUTPATIENT)
Dept: HEALTH INFORMATION MANAGEMENT | Facility: CLINIC | Age: 73
End: 2023-11-27
Payer: COMMERCIAL

## 2023-11-27 LAB — TSH SERPL-ACNC: 3.4 UIU/ML (ref 0.45–4.5)

## 2023-12-01 ENCOUNTER — TRANSFERRED RECORDS (OUTPATIENT)
Dept: HEALTH INFORMATION MANAGEMENT | Facility: CLINIC | Age: 73
End: 2023-12-01
Payer: COMMERCIAL

## 2023-12-11 ENCOUNTER — APPOINTMENT (OUTPATIENT)
Dept: RADIOLOGY | Facility: CLINIC | Age: 73
End: 2023-12-11
Attending: PHYSICIAN ASSISTANT
Payer: COMMERCIAL

## 2023-12-11 ENCOUNTER — HOSPITAL ENCOUNTER (EMERGENCY)
Facility: CLINIC | Age: 73
Discharge: HOME | End: 2023-12-11
Attending: EMERGENCY MEDICINE | Admitting: EMERGENCY MEDICINE
Payer: COMMERCIAL

## 2023-12-11 VITALS
SYSTOLIC BLOOD PRESSURE: 134 MMHG | HEART RATE: 78 BPM | DIASTOLIC BLOOD PRESSURE: 76 MMHG | OXYGEN SATURATION: 100 % | RESPIRATION RATE: 16 BRPM | TEMPERATURE: 98.3 F | BODY MASS INDEX: 19.14 KG/M2 | HEIGHT: 62 IN | WEIGHT: 104 LBS

## 2023-12-11 DIAGNOSIS — R07.89 ATYPICAL CHEST PAIN: ICD-10-CM

## 2023-12-11 LAB
ALBUMIN SERPL BCG-MCNC: 4.5 G/DL (ref 3.5–5.2)
ALP SERPL-CCNC: 64 U/L (ref 40–150)
ALT SERPL W P-5'-P-CCNC: 21 U/L (ref 0–50)
ANION GAP SERPL CALCULATED.3IONS-SCNC: 9 MMOL/L (ref 7–15)
AST SERPL W P-5'-P-CCNC: 22 U/L (ref 0–45)
ATRIAL RATE - MUSE: 82 BPM
BILIRUB DIRECT SERPL-MCNC: <0.2 MG/DL (ref 0–0.3)
BILIRUB SERPL-MCNC: 0.3 MG/DL
BUN SERPL-MCNC: 18.1 MG/DL (ref 8–23)
CALCIUM SERPL-MCNC: 9.7 MG/DL (ref 8.8–10.2)
CHLORIDE SERPL-SCNC: 98 MMOL/L (ref 98–107)
CREAT SERPL-MCNC: 0.58 MG/DL (ref 0.51–0.95)
D DIMER PPP FEU-MCNC: 0.34 UG/ML FEU (ref 0–0.5)
DEPRECATED HCO3 PLAS-SCNC: 25 MMOL/L (ref 22–29)
DIASTOLIC BLOOD PRESSURE - MUSE: NORMAL MMHG
EGFRCR SERPLBLD CKD-EPI 2021: >90 ML/MIN/1.73M2
ERYTHROCYTE [DISTWIDTH] IN BLOOD BY AUTOMATED COUNT: 26.4 % (ref 10–15)
GLUCOSE SERPL-MCNC: 106 MG/DL (ref 70–99)
HCT VFR BLD AUTO: 32.7 % (ref 35–47)
HGB BLD-MCNC: 10.5 G/DL (ref 11.7–15.7)
HOLD SPECIMEN: NORMAL
INTERPRETATION ECG - MUSE: NORMAL
LIPASE SERPL-CCNC: 47 U/L (ref 13–60)
MCH RBC QN AUTO: 26.3 PG (ref 26.5–33)
MCHC RBC AUTO-ENTMCNC: 32.1 G/DL (ref 31.5–36.5)
MCV RBC AUTO: 82 FL (ref 78–100)
P AXIS - MUSE: 51 DEGREES
PLATELET # BLD AUTO: 218 10E3/UL (ref 150–450)
POTASSIUM SERPL-SCNC: 3.7 MMOL/L (ref 3.4–5.3)
PR INTERVAL - MUSE: 180 MS
PROT SERPL-MCNC: 6.9 G/DL (ref 6.4–8.3)
QRS DURATION - MUSE: 70 MS
QT - MUSE: 352 MS
QTC - MUSE: 411 MS
R AXIS - MUSE: 36 DEGREES
RBC # BLD AUTO: 3.99 10E6/UL (ref 3.8–5.2)
SODIUM SERPL-SCNC: 132 MMOL/L (ref 135–145)
SYSTOLIC BLOOD PRESSURE - MUSE: NORMAL MMHG
T AXIS - MUSE: 42 DEGREES
TROPONIN T SERPL HS-MCNC: 6 NG/L
TROPONIN T SERPL HS-MCNC: 7 NG/L
VENTRICULAR RATE- MUSE: 82 BPM
WBC # BLD AUTO: 5.9 10E3/UL (ref 4–11)

## 2023-12-11 PROCEDURE — 36415 COLL VENOUS BLD VENIPUNCTURE: CPT | Performed by: PHYSICIAN ASSISTANT

## 2023-12-11 PROCEDURE — 84484 ASSAY OF TROPONIN QUANT: CPT | Performed by: PHYSICIAN ASSISTANT

## 2023-12-11 PROCEDURE — 99285 EMERGENCY DEPT VISIT HI MDM: CPT | Mod: 25

## 2023-12-11 PROCEDURE — 71046 X-RAY EXAM CHEST 2 VIEWS: CPT

## 2023-12-11 PROCEDURE — 82310 ASSAY OF CALCIUM: CPT | Performed by: PHYSICIAN ASSISTANT

## 2023-12-11 PROCEDURE — 36415 COLL VENOUS BLD VENIPUNCTURE: CPT | Performed by: STUDENT IN AN ORGANIZED HEALTH CARE EDUCATION/TRAINING PROGRAM

## 2023-12-11 PROCEDURE — 85379 FIBRIN DEGRADATION QUANT: CPT | Performed by: PHYSICIAN ASSISTANT

## 2023-12-11 PROCEDURE — 93005 ELECTROCARDIOGRAM TRACING: CPT | Performed by: EMERGENCY MEDICINE

## 2023-12-11 PROCEDURE — 83690 ASSAY OF LIPASE: CPT | Performed by: PHYSICIAN ASSISTANT

## 2023-12-11 PROCEDURE — 82374 ASSAY BLOOD CARBON DIOXIDE: CPT | Performed by: PHYSICIAN ASSISTANT

## 2023-12-11 PROCEDURE — 85027 COMPLETE CBC AUTOMATED: CPT | Performed by: PHYSICIAN ASSISTANT

## 2023-12-11 PROCEDURE — 82248 BILIRUBIN DIRECT: CPT | Performed by: PHYSICIAN ASSISTANT

## 2023-12-11 RX ORDER — CHOLECALCIFEROL (VITAMIN D3) 50 MCG
50 TABLET ORAL DAILY
COMMUNITY

## 2023-12-11 RX ORDER — BIOTIN 1 MG
1000 TABLET ORAL DAILY PRN
COMMUNITY

## 2023-12-11 RX ORDER — AMOXICILLIN 500 MG
1200 CAPSULE ORAL 2 TIMES DAILY
COMMUNITY
End: 2023-12-11

## 2023-12-11 RX ORDER — LIDOCAINE 4 G/G
1 PATCH TOPICAL EVERY 24 HOURS
Qty: 5 PATCH | Refills: 0 | Status: SHIPPED | OUTPATIENT
Start: 2023-12-11 | End: 2023-12-16

## 2023-12-11 RX ORDER — OMEGA-3 FATTY ACIDS/FISH OIL 300-1000MG
1000 CAPSULE ORAL 2 TIMES DAILY
COMMUNITY

## 2023-12-11 RX ORDER — FERROUS SULFATE 325(65) MG
325 TABLET ORAL
COMMUNITY

## 2023-12-11 RX ORDER — ATORVASTATIN CALCIUM 40 MG/1
40 TABLET, FILM COATED ORAL AT BEDTIME
COMMUNITY

## 2023-12-11 RX ORDER — UBIDECARENONE 30 MG
1 CAPSULE ORAL DAILY PRN
COMMUNITY

## 2023-12-11 ASSESSMENT — ACTIVITIES OF DAILY LIVING (ADL): ADLS_ACUITY_SCORE: 35

## 2023-12-11 NOTE — ED PROVIDER NOTES
"Emergency Department Midlevel Supervisory Note     I personally saw the patient and performed a substantive portion of the visit including all aspects of the medical decision making.    ED Course:  3:57 PM Elizabeth Davidson PA-C staffed patient with me. I agree with their assessment and plan of management, and I will see the patient.  4:06 PM I met with the patient to introduce myself, gather additional history, perform my initial exam, and discuss the plan.     Workup all negative, reassuring. Exam consistent with likely chest wall pain.  Given age and presentation, appropriate for outpatient rapid access cardiology follow up, for which Elizabeth placed referral order.    Brief HPI:     Alma Moore is a 73 year old female who presents for evaluation of chest pain and nausea.    Patient reports left sided chest wall pain noticed with walking and leaning forward, that radiates through her back, which started this morning. Pain worse with palpation as well.  Endorses pain when taking a deep breath. Rates pain a 6/10 and denies shortness of breath. No other reported complaints or concerns at this time.    I, Maria D Terrell, am serving as a scribe to document services personally performed by Dr. Jordan Luis, based on my observations and the provider's statements to me.   I, Jordan Luis, DO attest that Maria D Terrell is acting in a scribe capacity, has observed my performance of the services and has documented them in accordance with my direction.    Brief Physical Exam: /76   Pulse 78   Temp 98.3  F (36.8  C) (Oral)   Resp 16   Ht 1.575 m (5' 2\")   Wt 47.2 kg (104 lb)   LMP  (LMP Unknown)   SpO2 100%   BMI 19.02 kg/m    Constitutional:  Alert, in no acute distress  EYES: Conjunctivae clear  HENT:  Atraumatic, normocephalic  Respiratory:  Respirations even, unlabored, in no acute respiratory distress  Cardiovascular:  Regular rate and rhythm, good peripheral perfusion, no JVD  GI: Soft, nondistended, " nontender, no palpable masses, no rebound, no guarding   Musculoskeletal:  No edema. No cyanosis. Range of motion major extremities intact.  No calf tenderness/swelling b/l.  Mildly TTP left anterior chest wall  Integument: Warm, Dry, No erythema, No rash.   Neurologic:  Alert & oriented, no focal deficits noted  Psych: Normal mood and affect     MDM:  Pt seen in conjunction with SANJU Elizabeth Davidson.  Pt here with chest pain, otherwise generally well, low risk for ACS.  2 troponins negative, d-dimer negative.  CXR clear.  No indication for CTA chest. Will discharge, outpatient primary and rapid access cardiology follow up.       1. Atypical chest pain        Labs and Imaging:  Results for orders placed or performed during the hospital encounter of 12/11/23   Chest XR,  PA & LAT    Impression    IMPRESSION: Negative chest.     Extra Blue Top Tube   Result Value Ref Range    Hold Specimen JIC    Extra Red Top Tube   Result Value Ref Range    Hold Specimen JIC    Extra Green Top (Lithium Heparin) Tube   Result Value Ref Range    Hold Specimen .    Extra Purple Top Tube   Result Value Ref Range    Hold Specimen .    CBC with platelets   Result Value Ref Range    WBC Count 5.9 4.0 - 11.0 10e3/uL    RBC Count 3.99 3.80 - 5.20 10e6/uL    Hemoglobin 10.5 (L) 11.7 - 15.7 g/dL    Hematocrit 32.7 (L) 35.0 - 47.0 %    MCV 82 78 - 100 fL    MCH 26.3 (L) 26.5 - 33.0 pg    MCHC 32.1 31.5 - 36.5 g/dL    RDW 26.4 (H) 10.0 - 15.0 %    Platelet Count 218 150 - 450 10e3/uL   Basic metabolic panel   Result Value Ref Range    Sodium 132 (L) 135 - 145 mmol/L    Potassium 3.7 3.4 - 5.3 mmol/L    Chloride 98 98 - 107 mmol/L    Carbon Dioxide (CO2) 25 22 - 29 mmol/L    Anion Gap 9 7 - 15 mmol/L    Urea Nitrogen 18.1 8.0 - 23.0 mg/dL    Creatinine 0.58 0.51 - 0.95 mg/dL    GFR Estimate >90 >60 mL/min/1.73m2    Calcium 9.7 8.8 - 10.2 mg/dL    Glucose 106 (H) 70 - 99 mg/dL   Hepatic function panel   Result Value Ref Range    Protein Total 6.9  6.4 - 8.3 g/dL    Albumin 4.5 3.5 - 5.2 g/dL    Bilirubin Total 0.3 <=1.2 mg/dL    Alkaline Phosphatase 64 40 - 150 U/L    AST 22 0 - 45 U/L    ALT 21 0 - 50 U/L    Bilirubin Direct <0.20 0.00 - 0.30 mg/dL   Result Value Ref Range    Lipase 47 13 - 60 U/L   Troponin T, High Sensitivity (now)   Result Value Ref Range    Troponin T, High Sensitivity 7 <=14 ng/L   D dimer quantitative   Result Value Ref Range    D-Dimer Quantitative 0.34 0.00 - 0.50 ug/mL FEU   Troponin T, High Sensitivity (now)   Result Value Ref Range    Troponin T, High Sensitivity 6 <=14 ng/L   ECG 12-LEAD WITH MUSE (LHE)   Result Value Ref Range    Systolic Blood Pressure  mmHg    Diastolic Blood Pressure  mmHg    Ventricular Rate 82 BPM    Atrial Rate 82 BPM    IN Interval 180 ms    QRS Duration 70 ms     ms    QTc 411 ms    P Axis 51 degrees    R AXIS 36 degrees    T Axis 42 degrees    Interpretation ECG       Sinus rhythm  Normal ECG  No previous ECGs available  Confirmed by SEE ED PROVIDER NOTE FOR, ECG INTERPRETATION ([id number]),  JOSE A LORD ([id number]) on 12/11/2023 3:12:00 PM       I have reviewed the relevant laboratory and radiology studies    Procedures:  I was present for the key portions of this procedure: none    Jordan Luis DO  [facility] EMERGENCY ROOM  [address] [id number] [phone]     Jordan Luis MD  12/11/23 [id number]

## 2023-12-11 NOTE — PROGRESS NOTES
Pharmacist Admission Medication History    Admission medication history is complete. The information provided in this note is only as accurate as the sources available at the time of the update.    Information Source(s): Patient and CareEverywhere/SureScripts via in-person    Pertinent Information: none    Changes made to PTA medication list:  Added: B complex, ferrous sulfate, calcium, magnesium, biotin, Co-Q-10, eye drop - name unknown  Deleted: None  Changed: vitamin D 2000 units not 1000 units stregnth, fish oil 1000 mg strength    Medication Affordability: Patient found a lower cost alternative to Xiidra ophthalmic.     Allergies reviewed with patient and updates made in EHR: yes    Medication History Completed By: Fay Giron [facility] 12/11/2023 4:05 PM    PTA Med List   Medication Sig Last Dose    atorvastatin (LIPITOR) 40 MG tablet Take 40 mg by mouth at bedtime 12/10/2023 at pm    biotin 1000 MCG TABS tablet Take 1,000 mcg by mouth daily as needed Past Week    calcium carbonate (OS-MARC) 1500 (600 Ca) MG tablet Take 600 mg by mouth daily 12/11/2023 at am    coenzyme Q-10 capsule Take 1 capsule by mouth daily as needed Past Week    EPINEPHrine (EPIPEN 2-LISA) 0.3 MG/0.3ML injection 2-pack Issue a 2 pen packet   INJECT 0.3 MLS INTO THE MUSCLE ONCE AS NEEDED FOR ANAPHYLAXIS Unknown    ferrous sulfate (FEROSUL) 325 (65 Fe) MG tablet Take 325 mg by mouth daily (with breakfast) 12/11/2023 at am    levothyroxine (SYNTHROID/LEVOTHROID) 50 MCG tablet TAKE 1 TABLET EVERY DAY 12/11/2023 at am    Lidocaine (LIDOCARE) 4 % Patch Place 1 patch onto the skin every 24 hours for 5 days To prevent lidocaine toxicity, patient should be patch free for 12 hrs daily.     Magnesium Oxide 250 MG TABS Take 250 mg by mouth daily as needed Past Week    omega 3 1000 MG CAPS Take 1,000 mg by mouth 2 times daily 12/11/2023 at am    UNABLE TO FIND Place 1 drop into both eyes 2 times daily MEDICATION NAME: alternative to Xiidra 12/11/2023 at  am - can skip    vitamin B complex with vitamin C (VITAMIN  B COMPLEX) tablet Take 1 tablet by mouth daily 12/11/2023 at am    vitamin D3 (CHOLECALCIFEROL) 50 mcg (2000 units) tablet Take 50 mcg by mouth daily 12/11/2023 at am

## 2023-12-11 NOTE — ED PROVIDER NOTES
EMERGENCY DEPARTMENT ENCOUNTER      NAME: Alma Moore  AGE: 73 year old female  YOB: 1950  MRN: [id number]  EVALUATION DATE & TIME: 12/11/2023  1:15 PM    PCP: [facility]    ED PROVIDER: Elizabeth Davidson PA-C      Chief Complaint   Patient presents with    Chest Pain    Nausea     FINAL IMPRESSION:  1. Atypical chest pain        MEDICAL DECISION MAKING:    Pertinent Labs & Imaging studies reviewed. (See chart for details)  73 year old female with a h/o hypercholesterolemia, acquired hypothyroidism, cervicalgia presents to the Emergency Department for evaluation of left sided chest wall pain noticed with walking and leaning forward this morning. On exam she is alert, non toxic appearing and in no acute distress.  Vitals are WNL.  She does not have any peripheral chest pain upon palpation.  Lungs sound clear without crackle or wheeze.  I do not appreciate a heart murmur.  Radial pulses are equal.    Differential diagnosis includes ACS, pneumonia, pneumothorax, costochondritis, myocarditis, muscle spasm, herpes zoster, diaphragmatic irritation.  CBC with known anemia, BMP unremarkable.  LFTs and lipase are WNL.  EKG shows normal sinus without ST elevation/depression or reciprocal changes to suggest ischemia.  Initial and repeat high-sensitivity troponin are not concerning for myocardial injury.  Patient's heart score is low.  Suspect element of costochondritis versus muscle spasm as etiology for the patient's pain, discussed with patient that cause is unclear, but given age and risk factors recommend outpatient rapid access cardiology consultation.  This referral was placed.  Discussed red flag symptoms to return.    There is no evidence of acute or emergent process requiring intervention at this time. Pt is appropriate for outpatient management. Provisional nature of today's diagnosis was discussed and strict return precautions were given. Pt expressed understanding and She was  discharged to home in good condition.     Medical Decision Making    History:  Supplemental history from: Documented in chart, if applicable  External Record(s) reviewed: Documented in chart, if applicable.    Work Up:  Chart documentation includes differential considered and any EKGs or imaging independently interpreted by provider, where specified.  In additional to work up documented, I considered the following work up: Documented in chart, if applicable.    External consultation:  Discussion of management with another provider: Documented in chart, if applicable    Complicating factors:  Care impacted by chronic illness: Hyperlipidemia  Care affected by social determinants of health: N/A    Disposition considerations: Discharge. No recommendations on prescription strength medication(s). See documentation for any additional details.        CRITICAL CARE: None    ED COURSE  1:50 PM  Met and evaluated patient. Discussed ED plan.   3:55 PM Staffed the patient with Dr. Juan Luis  4:05 PM discharged to home in good condition by RN.            MEDICATIONS GIVEN IN THE EMERGENCY:  Medications - No data to display    NEW PRESCRIPTIONS STARTED AT TODAY'S ER VISIT  Discharge Medication List as of 12/11/2023  4:22 PM        START taking these medications    Details   Lidocaine (LIDOCARE) 4 % Patch Place 1 patch onto the skin every 24 hours for 5 days To prevent lidocaine toxicity, patient should be patch free for 12 hrs daily.Disp-5 patch, [id number]                =================================================================    HPI    Patient information was obtained from: patient    Use of Intrepreter: N/A     Alma Moore is a 73 year old female who presents for evaluation of left lower chest wall pain which began while walking this morning.  She does have a remote history of similar pain but does not remember if it was related to activity and she did not present for care during those episodes.  States that  pain got worse with leaning forward this morning.  She walks daily as well as lifting light weights and she line dances.  Presented to the emergency department instead of going line dancing today.  Does not notice the pain with any direct palpation of the area, but states that it is present with deep inspiration.  She has not had any nausea, vomiting, shortness of breath, chest pain otherwise, numbness or tingling of the upper extremities, sore throat, headache or fever.  The pain does not radiate to the back.  There is no ripping or tearing sensation.      REVIEW OF SYSTEMS   As noted in HPI. All other systems negative.    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    Neck pain     Pelvic mass     Rectal pain     Rotator cuff syndrome     Sciatica     TMJ (dislocation of temporomandibular joint) left    Urinary frequency     Urinary urgency     Vitamin D deficiencies        PAST SURGICAL HISTORY:  Past Surgical History:   Procedure Laterality Date    CL AFF SURGICAL PATHOLOGY      removed from back    HC TOOTH EXTRACTION W/FORCEP      HYSTERECTOMY TOTAL ABDOMINAL, BILATERAL SALPINGO-OOPHORECTOMY, COMBINED  9/17/2012    Procedure: COMBINED HYSTERECTOMY TOTAL ABDOMINAL, SALPINGO-OOPHORECTOMY;  TOTAL ABDOMINAL HYSTERECTOMY, BILATERAL SALPINGO OOPHORECTOMY ;  Surgeon: Devyn Ha MD;  Location: [facility]    HYSTERECTOMY, PAP NO LONGER INDICATED  09/17/2012    LAPAROTOMY, STAGING, COMBINED  9/17/2012    Procedure: COMBINED LAPAROTOMY, STAGING;;  Surgeon: Devyn Ha MD;  Location: [facility]    ORTHOPEDIC SURGERY  2009     Right hammertoe and bunionectomy           CURRENT MEDICATIONS:    atorvastatin (LIPITOR) 40 MG tablet  biotin 1000 MCG TABS tablet  calcium carbonate (OS-MARC) 1500 (600 Ca) MG tablet  coenzyme Q-10 capsule  EPINEPHrine (EPIPEN  2-LISA) 0.3 MG/0.3ML injection 2-pack  ferrous sulfate (FEROSUL) 325 (65 Fe) MG tablet  levothyroxine (SYNTHROID/LEVOTHROID) 50 MCG tablet  Lidocaine (LIDOCARE) 4 % Patch  Magnesium Oxide 250 MG TABS  omega 3 1000 MG CAPS  UNABLE TO FIND  vitamin B complex with vitamin C (VITAMIN  B COMPLEX) tablet  vitamin D3 (CHOLECALCIFEROL) 50 mcg (2000 units) tablet        ALLERGIES:  Allergies   Allergen Reactions    Trazodone Nausea    Fosamax     Latex Itching    Proliaa     Shellfish Allergy     Sulfa Antibiotics      Headache       FAMILY HISTORY:  Family History   Problem Relation Age of Onset    Hypertension Mother     Osteoporosis Mother     Arthritis Mother     Unknown/Adopted Father     Diabetes No family hx of     Coronary Artery Disease No family hx of     Hyperlipidemia No family hx of     Cerebrovascular Disease No family hx of     Breast Cancer No family hx of     Colon Cancer No family hx of     Prostate Cancer No family hx of     Other Cancer No family hx of     Depression No family hx of     Anxiety Disorder No family hx of     Mental Illness No family hx of     Substance Abuse No family hx of     Anesthesia Reaction No family hx of     Asthma No family hx of     Genetic Disorder No family hx of     Thyroid Disease No family hx of     Obesity No family hx of        SOCIAL HISTORY:   Social History     Socioeconomic History    Marital status: Single     Spouse name: Not on file    Number of children: Not on file    Years of education: Not on file    Highest education level: Not on file   Occupational History    Not on file   Tobacco Use    Smoking status: Never    Smokeless tobacco: Never   Substance and Sexual Activity    Alcohol use: Yes     Alcohol/week: 0.0 standard drinks of alcohol    Drug use: No    Sexual activity: Not Currently     Partners: Male   Other Topics Concern    Parent/sibling w/ CABG, MI or angioplasty before 65F 55M? No   Social History Narrative    Not on file     Social Determinants of  "Health     Financial Resource Strain: Not on file   Food Insecurity: Not on file   Transportation Needs: Not on file   Physical Activity: Not on file   Stress: Not on file   Social Connections: Not on file   Interpersonal Safety: Not on file   Housing Stability: Not on file         VITALS:  Patient Vitals for the past 24 hrs:   BP Temp Temp src Pulse Resp SpO2 Height Weight   12/11/23 1621 134/76 98.3  F (36.8  C) Oral 78 -- -- -- --   12/11/23 1530 138/74 -- -- 72 16 100 % -- --   12/11/23 1515 (!) 140/74 -- -- 71 19 97 % -- --   12/11/23 1500 137/74 -- -- 74 14 98 % -- --   12/11/23 1445 137/81 -- -- 68 15 99 % -- --   12/11/23 1430 138/76 -- -- 69 10 97 % -- --   12/11/23 1415 137/74 -- -- 69 13 98 % -- --   12/11/23 1400 (!) 141/82 -- -- 72 21 100 % -- --   12/11/23 1345 136/75 -- -- 74 27 100 % -- --   12/11/23 1340 -- -- -- 82 14 100 % -- --   12/11/23 1335 -- -- -- 72 30 98 % -- --   12/11/23 1330 (!) 144/81 -- -- 72 15 99 % -- --   12/11/23 1325 -- -- -- 72 (!) 8 100 % -- --   12/11/23 1320 (!) 147/79 -- -- 80 20 99 % -- --   12/11/23 1152 (!) 159/75 98.3  F (36.8  C) Temporal 88 16 100 % 1.575 m (5' 2\") 47.2 kg (104 lb)       PHYSICAL EXAM    Physical Exam  Vitals reviewed.   Constitutional:       General: She is not in acute distress.     Appearance: She is well-developed. She is not ill-appearing, toxic-appearing or diaphoretic.      Comments: thin   HENT:      Head: Normocephalic and atraumatic.   Cardiovascular:      Rate and Rhythm: Normal rate.      Pulses:           Radial pulses are 2+ on the right side and 2+ on the left side.   Pulmonary:      Effort: Pulmonary effort is normal. No tachypnea or respiratory distress.      Breath sounds: No decreased breath sounds, wheezing or rhonchi.   Musculoskeletal:         General: Normal range of motion.      Cervical back: Normal range of motion.      Right lower leg: No edema.      Left lower leg: No edema.   Skin:     General: Skin is warm.      Capillary " Refill: Capillary refill takes less than 2 seconds.      Findings: No rash.   Neurological:      General: No focal deficit present.      Mental Status: She is alert.          LAB:  All pertinent labs reviewed and interpreted.    Labs Ordered and Resulted from Time of ED Arrival to Time of ED Departure   CBC WITH PLATELETS - Abnormal       Result Value    WBC Count 5.9      RBC Count 3.99      Hemoglobin 10.5 (*)     Hematocrit 32.7 (*)     MCV 82      MCH 26.3 (*)     MCHC 32.1      RDW 26.4 (*)     Platelet Count 218     BASIC METABOLIC PANEL - Abnormal    Sodium 132 (*)     Potassium 3.7      Chloride 98      Carbon Dioxide (CO2) 25      Anion Gap 9      Urea Nitrogen 18.1      Creatinine 0.58      GFR Estimate >90      Calcium 9.7      Glucose 106 (*)    HEPATIC FUNCTION PANEL - Normal    Protein Total 6.9      Albumin 4.5      Bilirubin Total 0.3      Alkaline Phosphatase 64      AST 22      ALT 21      Bilirubin Direct <0.20     LIPASE - Normal    Lipase 47     TROPONIN T, HIGH SENSITIVITY - Normal    Troponin T, High Sensitivity 7     D DIMER QUANTITATIVE - Normal    D-Dimer Quantitative 0.34     TROPONIN T, HIGH SENSITIVITY - Normal    Troponin T, High Sensitivity 6           RADIOLOGY:  Reviewed all pertinent imaging. Please see official radiology report    Chest XR,  PA & LAT   Final Result   IMPRESSION: Negative chest.               EKG:    Performed at: 1158a  Impression: sinus  Vent rate: 82 bpm  Pr interval: 180 ms  QRS duration: 70 ms  QT/Qtc: 352/411 ms  Dr. Juan Luis and I have independently reviewed and interpreted the EKG(s) documented above.        Elizabeth Davidson PA-C  Emergency Medicine  [facility] EMERGENCY ROOM  [facility] [facility] [address] [phone]  Dept: [phone]    This note has in part been created with speech recognition technology and may create an occasional, unintended word/grammar substitution.  Errors are generally corrected in real time. Please message me via [employer] In Basket if you note any errors requiring clarification.       Elizabeth Davidson PA-C  12/11/23 [id number]

## 2023-12-11 NOTE — ED TRIAGE NOTES
Patient was walking this morning ~1030 when she developed sudden sharp chest pain under left breast which radiates through to back. Pain still occurs with deep breaths and is rated 6/10. Denies shortness of breath.

## 2023-12-11 NOTE — DISCHARGE INSTRUCTIONS
You were seen in the emergency department for left-sided chest pain.  Thankfully, your chest x-ray does not show any signs of infection.  Your EKG does not show any abnormalities to the electrical signal of your heart.  Your heart enzyme was normal and the rest of your labs were also reassuring.  We are not sure what is causing your pain, this could be due to nonspecific inflammation or a muscle spasm.  Please use lidocaine patches to see if these help.  You may cut them in half if needed.     If you develop any increased chest pain, shortness of breath, nausea and vomiting or fever you should return to the emergency department.  Otherwise, please follow-up with cardiology.

## 2023-12-22 ENCOUNTER — TRANSFERRED RECORDS (OUTPATIENT)
Dept: HEALTH INFORMATION MANAGEMENT | Facility: CLINIC | Age: 73
End: 2023-12-22
Payer: COMMERCIAL

## 2024-01-15 ENCOUNTER — TRANSFERRED RECORDS (OUTPATIENT)
Dept: HEALTH INFORMATION MANAGEMENT | Facility: CLINIC | Age: 74
End: 2024-01-15
Payer: COMMERCIAL

## 2024-03-07 ENCOUNTER — LAB REQUISITION (OUTPATIENT)
Dept: LAB | Facility: CLINIC | Age: 74
End: 2024-03-07
Payer: COMMERCIAL

## 2024-03-07 DIAGNOSIS — E03.9 HYPOTHYROIDISM, UNSPECIFIED: ICD-10-CM

## 2024-03-07 DIAGNOSIS — E78.2 MIXED HYPERLIPIDEMIA: ICD-10-CM

## 2024-03-07 DIAGNOSIS — M81.0 AGE-RELATED OSTEOPOROSIS WITHOUT CURRENT PATHOLOGICAL FRACTURE: ICD-10-CM

## 2024-03-07 PROCEDURE — 80061 LIPID PANEL: CPT | Mod: ORL | Performed by: FAMILY MEDICINE

## 2024-03-07 PROCEDURE — 80053 COMPREHEN METABOLIC PANEL: CPT | Mod: ORL | Performed by: FAMILY MEDICINE

## 2024-03-07 PROCEDURE — 82306 VITAMIN D 25 HYDROXY: CPT | Mod: ORL | Performed by: FAMILY MEDICINE

## 2024-03-07 PROCEDURE — 84443 ASSAY THYROID STIM HORMONE: CPT | Mod: ORL | Performed by: FAMILY MEDICINE

## 2024-03-08 LAB
ALBUMIN SERPL BCG-MCNC: 4.4 G/DL (ref 3.5–5.2)
ALP SERPL-CCNC: 63 U/L (ref 40–150)
ALT SERPL W P-5'-P-CCNC: 27 U/L (ref 0–50)
ANION GAP SERPL CALCULATED.3IONS-SCNC: 8 MMOL/L (ref 7–15)
AST SERPL W P-5'-P-CCNC: 27 U/L (ref 0–45)
BILIRUB SERPL-MCNC: 0.2 MG/DL
BUN SERPL-MCNC: 20.9 MG/DL (ref 8–23)
CALCIUM SERPL-MCNC: 9.5 MG/DL (ref 8.8–10.2)
CHLORIDE SERPL-SCNC: 98 MMOL/L (ref 98–107)
CHOLEST SERPL-MCNC: 218 MG/DL
CREAT SERPL-MCNC: 0.62 MG/DL (ref 0.51–0.95)
DEPRECATED HCO3 PLAS-SCNC: 28 MMOL/L (ref 22–29)
EGFRCR SERPLBLD CKD-EPI 2021: >90 ML/MIN/1.73M2
FASTING STATUS PATIENT QL REPORTED: ABNORMAL
GLUCOSE SERPL-MCNC: 90 MG/DL (ref 70–99)
HDLC SERPL-MCNC: 103 MG/DL
LDLC SERPL CALC-MCNC: 105 MG/DL
NONHDLC SERPL-MCNC: 115 MG/DL
POTASSIUM SERPL-SCNC: 4.5 MMOL/L (ref 3.4–5.3)
PROT SERPL-MCNC: 6.7 G/DL (ref 6.4–8.3)
SODIUM SERPL-SCNC: 134 MMOL/L (ref 135–145)
TRIGL SERPL-MCNC: 50 MG/DL
TSH SERPL DL<=0.005 MIU/L-ACNC: 3.37 UIU/ML (ref 0.3–4.2)
VIT D+METAB SERPL-MCNC: 58 NG/ML (ref 20–50)

## 2024-05-02 ENCOUNTER — MEDICAL CORRESPONDENCE (OUTPATIENT)
Dept: HEALTH INFORMATION MANAGEMENT | Facility: CLINIC | Age: 74
End: 2024-05-02
Payer: COMMERCIAL

## 2024-05-07 ENCOUNTER — MEDICAL CORRESPONDENCE (OUTPATIENT)
Dept: HEALTH INFORMATION MANAGEMENT | Facility: CLINIC | Age: 74
End: 2024-05-07
Payer: COMMERCIAL

## 2024-05-07 DIAGNOSIS — M81.0 POST-MENOPAUSAL OSTEOPOROSIS: Primary | ICD-10-CM

## 2024-05-07 RX ORDER — HEPARIN SODIUM (PORCINE) LOCK FLUSH IV SOLN 100 UNIT/ML 100 UNIT/ML
5 SOLUTION INTRAVENOUS
Status: CANCELLED | OUTPATIENT
Start: 2024-05-08

## 2024-05-07 RX ORDER — METHYLPREDNISOLONE SODIUM SUCCINATE 125 MG/2ML
125 INJECTION, POWDER, LYOPHILIZED, FOR SOLUTION INTRAMUSCULAR; INTRAVENOUS
Status: CANCELLED
Start: 2024-05-08

## 2024-05-07 RX ORDER — HEPARIN SODIUM,PORCINE 10 UNIT/ML
5-20 VIAL (ML) INTRAVENOUS DAILY PRN
Status: CANCELLED | OUTPATIENT
Start: 2024-05-08

## 2024-05-07 RX ORDER — MEPERIDINE HYDROCHLORIDE 25 MG/ML
25 INJECTION INTRAMUSCULAR; INTRAVENOUS; SUBCUTANEOUS EVERY 30 MIN PRN
Status: CANCELLED | OUTPATIENT
Start: 2024-05-08

## 2024-05-07 RX ORDER — ALBUTEROL SULFATE 90 UG/1
1-2 AEROSOL, METERED RESPIRATORY (INHALATION)
Status: CANCELLED
Start: 2024-05-08

## 2024-05-07 RX ORDER — ZOLEDRONIC ACID 5 MG/100ML
5 INJECTION, SOLUTION INTRAVENOUS ONCE
Status: CANCELLED
Start: 2024-05-08

## 2024-05-07 RX ORDER — DIPHENHYDRAMINE HYDROCHLORIDE 50 MG/ML
50 INJECTION INTRAMUSCULAR; INTRAVENOUS
Status: CANCELLED
Start: 2024-05-08

## 2024-05-07 RX ORDER — EPINEPHRINE 1 MG/ML
0.3 INJECTION, SOLUTION, CONCENTRATE INTRAVENOUS EVERY 5 MIN PRN
Status: CANCELLED | OUTPATIENT
Start: 2024-05-08

## 2024-05-07 RX ORDER — ALBUTEROL SULFATE 0.83 MG/ML
2.5 SOLUTION RESPIRATORY (INHALATION)
Status: CANCELLED | OUTPATIENT
Start: 2024-05-08

## 2024-05-24 ENCOUNTER — INFUSION THERAPY VISIT (OUTPATIENT)
Dept: INFUSION THERAPY | Facility: HOSPITAL | Age: 74
End: 2024-05-24
Attending: FAMILY MEDICINE
Payer: COMMERCIAL

## 2024-05-24 VITALS
BODY MASS INDEX: 19.58 KG/M2 | DIASTOLIC BLOOD PRESSURE: 81 MMHG | OXYGEN SATURATION: 99 % | HEART RATE: 68 BPM | HEIGHT: 62 IN | RESPIRATION RATE: 18 BRPM | SYSTOLIC BLOOD PRESSURE: 153 MMHG | WEIGHT: 106.4 LBS | TEMPERATURE: 97.6 F

## 2024-05-24 DIAGNOSIS — M81.0 POST-MENOPAUSAL OSTEOPOROSIS: Primary | ICD-10-CM

## 2024-05-24 LAB
ALBUMIN SERPL BCG-MCNC: 4.5 G/DL (ref 3.5–5.2)
CALCIUM SERPL-MCNC: 9.9 MG/DL (ref 8.8–10.2)
CREAT SERPL-MCNC: 0.6 MG/DL (ref 0.51–0.95)
EGFRCR SERPLBLD CKD-EPI 2021: >90 ML/MIN/1.73M2

## 2024-05-24 PROCEDURE — 82310 ASSAY OF CALCIUM: CPT | Performed by: FAMILY MEDICINE

## 2024-05-24 PROCEDURE — 258N000003 HC RX IP 258 OP 636: Performed by: FAMILY MEDICINE

## 2024-05-24 PROCEDURE — 250N000011 HC RX IP 250 OP 636: Mod: JZ | Performed by: FAMILY MEDICINE

## 2024-05-24 PROCEDURE — 82040 ASSAY OF SERUM ALBUMIN: CPT | Performed by: FAMILY MEDICINE

## 2024-05-24 PROCEDURE — 36415 COLL VENOUS BLD VENIPUNCTURE: CPT | Performed by: FAMILY MEDICINE

## 2024-05-24 PROCEDURE — 82565 ASSAY OF CREATININE: CPT | Performed by: FAMILY MEDICINE

## 2024-05-24 PROCEDURE — 96365 THER/PROPH/DIAG IV INF INIT: CPT

## 2024-05-24 RX ORDER — HEPARIN SODIUM,PORCINE 10 UNIT/ML
5-20 VIAL (ML) INTRAVENOUS DAILY PRN
OUTPATIENT
Start: 2025-05-24

## 2024-05-24 RX ORDER — METHYLPREDNISOLONE SODIUM SUCCINATE 125 MG/2ML
125 INJECTION, POWDER, LYOPHILIZED, FOR SOLUTION INTRAMUSCULAR; INTRAVENOUS
Status: DISCONTINUED | OUTPATIENT
Start: 2024-05-24 | End: 2024-05-24

## 2024-05-24 RX ORDER — DIPHENHYDRAMINE HYDROCHLORIDE 50 MG/ML
50 INJECTION INTRAMUSCULAR; INTRAVENOUS
Start: 2025-05-24

## 2024-05-24 RX ORDER — DIPHENHYDRAMINE HYDROCHLORIDE 50 MG/ML
50 INJECTION INTRAMUSCULAR; INTRAVENOUS
Status: DISCONTINUED | OUTPATIENT
Start: 2024-05-24 | End: 2024-05-24

## 2024-05-24 RX ORDER — EPINEPHRINE 1 MG/ML
0.3 INJECTION, SOLUTION INTRAMUSCULAR; SUBCUTANEOUS EVERY 5 MIN PRN
Status: DISCONTINUED | OUTPATIENT
Start: 2024-05-24 | End: 2024-05-24

## 2024-05-24 RX ORDER — ZOLEDRONIC ACID 5 MG/100ML
5 INJECTION, SOLUTION INTRAVENOUS ONCE
Status: CANCELLED
Start: 2025-05-24

## 2024-05-24 RX ORDER — EPINEPHRINE 1 MG/ML
0.3 INJECTION, SOLUTION INTRAMUSCULAR; SUBCUTANEOUS EVERY 5 MIN PRN
OUTPATIENT
Start: 2025-05-24

## 2024-05-24 RX ORDER — METHYLPREDNISOLONE SODIUM SUCCINATE 125 MG/2ML
125 INJECTION, POWDER, LYOPHILIZED, FOR SOLUTION INTRAMUSCULAR; INTRAVENOUS
Status: CANCELLED
Start: 2025-05-24

## 2024-05-24 RX ORDER — EPINEPHRINE 1 MG/ML
0.3 INJECTION, SOLUTION INTRAMUSCULAR; SUBCUTANEOUS EVERY 5 MIN PRN
Status: CANCELLED | OUTPATIENT
Start: 2025-05-24

## 2024-05-24 RX ORDER — HEPARIN SODIUM (PORCINE) LOCK FLUSH IV SOLN 100 UNIT/ML 100 UNIT/ML
5 SOLUTION INTRAVENOUS
OUTPATIENT
Start: 2025-05-24

## 2024-05-24 RX ORDER — ZOLEDRONIC ACID 5 MG/100ML
5 INJECTION, SOLUTION INTRAVENOUS ONCE
Status: COMPLETED | OUTPATIENT
Start: 2024-05-24 | End: 2024-05-24

## 2024-05-24 RX ORDER — ALBUTEROL SULFATE 0.83 MG/ML
2.5 SOLUTION RESPIRATORY (INHALATION)
OUTPATIENT
Start: 2025-05-24

## 2024-05-24 RX ORDER — MEPERIDINE HYDROCHLORIDE 50 MG/ML
25 INJECTION INTRAMUSCULAR; INTRAVENOUS; SUBCUTANEOUS EVERY 30 MIN PRN
Status: CANCELLED | OUTPATIENT
Start: 2025-05-24

## 2024-05-24 RX ORDER — HEPARIN SODIUM (PORCINE) LOCK FLUSH IV SOLN 100 UNIT/ML 100 UNIT/ML
5 SOLUTION INTRAVENOUS
Status: CANCELLED | OUTPATIENT
Start: 2025-05-24

## 2024-05-24 RX ORDER — METHYLPREDNISOLONE SODIUM SUCCINATE 125 MG/2ML
125 INJECTION, POWDER, LYOPHILIZED, FOR SOLUTION INTRAMUSCULAR; INTRAVENOUS
Start: 2025-05-24

## 2024-05-24 RX ORDER — ALBUTEROL SULFATE 90 UG/1
1-2 AEROSOL, METERED RESPIRATORY (INHALATION)
Status: CANCELLED
Start: 2025-05-24

## 2024-05-24 RX ORDER — ALBUTEROL SULFATE 90 UG/1
1-2 AEROSOL, METERED RESPIRATORY (INHALATION)
Start: 2025-05-24

## 2024-05-24 RX ORDER — ALBUTEROL SULFATE 0.83 MG/ML
2.5 SOLUTION RESPIRATORY (INHALATION)
Status: DISCONTINUED | OUTPATIENT
Start: 2024-05-24 | End: 2024-05-24

## 2024-05-24 RX ORDER — HEPARIN SODIUM,PORCINE 10 UNIT/ML
5-20 VIAL (ML) INTRAVENOUS DAILY PRN
Status: CANCELLED | OUTPATIENT
Start: 2025-05-24

## 2024-05-24 RX ORDER — DIPHENHYDRAMINE HYDROCHLORIDE 50 MG/ML
50 INJECTION INTRAMUSCULAR; INTRAVENOUS
Status: CANCELLED
Start: 2025-05-24

## 2024-05-24 RX ORDER — MEPERIDINE HYDROCHLORIDE 50 MG/ML
25 INJECTION INTRAMUSCULAR; INTRAVENOUS; SUBCUTANEOUS EVERY 30 MIN PRN
Status: DISCONTINUED | OUTPATIENT
Start: 2024-05-24 | End: 2024-05-24

## 2024-05-24 RX ORDER — ALBUTEROL SULFATE 90 UG/1
1-2 AEROSOL, METERED RESPIRATORY (INHALATION)
Status: DISCONTINUED | OUTPATIENT
Start: 2024-05-24 | End: 2024-05-24

## 2024-05-24 RX ORDER — ALBUTEROL SULFATE 0.83 MG/ML
2.5 SOLUTION RESPIRATORY (INHALATION)
Status: CANCELLED | OUTPATIENT
Start: 2025-05-24

## 2024-05-24 RX ORDER — MEPERIDINE HYDROCHLORIDE 50 MG/ML
25 INJECTION INTRAMUSCULAR; INTRAVENOUS; SUBCUTANEOUS EVERY 30 MIN PRN
OUTPATIENT
Start: 2025-05-24

## 2024-05-24 RX ORDER — ZOLEDRONIC ACID 5 MG/100ML
5 INJECTION, SOLUTION INTRAVENOUS ONCE
Start: 2025-05-24

## 2024-05-24 RX ADMIN — ZOLEDRONIC ACID 5 MG: 5 INJECTION, SOLUTION INTRAVENOUS at 14:08

## 2024-05-24 RX ADMIN — SODIUM CHLORIDE 250 ML: 9 INJECTION, SOLUTION INTRAVENOUS at 14:08

## 2024-05-24 NOTE — PROGRESS NOTES
Infusion Nursing Note:  Alma Moore presents today for Reclast.    Patient seen by provider today: No   present during visit today: Not Applicable.    Note: Alma arrived ambulatory by herself for her first dose of yearly Reclast. Plan of care reviewed and UpToDate patient education for Reclast was reviewed and provided. All patient questions answered at this time.  Alma was encouraged to drink plenty of water today and she may take Tylenol as needed for fever or flu like symptoms.   Reclast infused over 30 minutes instead of 15 minutes as Alma stated her veins can't always tolerate fast infusion rates.    Intravenous Access:  Peripheral IV placed and labs drawn by EDUARDA Jamison.    Treatment Conditions:  Lab Results   Component Value Date     (L) 03/07/2024    POTASSIUM 4.5 03/07/2024    CR 0.60 05/24/2024    MARC 9.9 05/24/2024    BILITOTAL 0.2 03/07/2024    ALBUMIN 4.5 05/24/2024    ALT 27 03/07/2024    AST 27 03/07/2024       Results reviewed, labs MET treatment parameters, ok to proceed with treatment.    Post Infusion Assessment:  Patient tolerated infusion without incident.  Blood return noted pre and post infusion.  Site patent and intact, free from redness, edema or discomfort.  Access discontinued per protocol.     Discharge Plan:    Patient discharged in stable condition accompanied by: self.  Departure Mode: Ambulatory.      Sonali Cohen RN